# Patient Record
Sex: MALE | Race: WHITE | HISPANIC OR LATINO | Employment: FULL TIME | URBAN - METROPOLITAN AREA
[De-identification: names, ages, dates, MRNs, and addresses within clinical notes are randomized per-mention and may not be internally consistent; named-entity substitution may affect disease eponyms.]

---

## 2017-09-13 ENCOUNTER — OFFICE VISIT (OUTPATIENT)
Dept: URGENT CARE | Facility: MEDICAL CENTER | Age: 22
End: 2017-09-13

## 2018-05-12 ENCOUNTER — TRANSCRIBE ORDERS (OUTPATIENT)
Dept: ADMINISTRATIVE | Facility: HOSPITAL | Age: 23
End: 2018-05-12

## 2018-05-12 ENCOUNTER — APPOINTMENT (OUTPATIENT)
Dept: RADIOLOGY | Age: 23
End: 2018-05-12
Payer: COMMERCIAL

## 2018-05-12 DIAGNOSIS — M54.50 ACUTE LEFT-SIDED LOW BACK PAIN WITHOUT SCIATICA: ICD-10-CM

## 2018-05-12 DIAGNOSIS — M25.552 ACUTE HIP PAIN, LEFT: ICD-10-CM

## 2018-05-12 DIAGNOSIS — M54.50 ACUTE LEFT-SIDED LOW BACK PAIN WITHOUT SCIATICA: Primary | ICD-10-CM

## 2018-05-12 PROCEDURE — 72110 X-RAY EXAM L-2 SPINE 4/>VWS: CPT

## 2018-05-12 PROCEDURE — 73502 X-RAY EXAM HIP UNI 2-3 VIEWS: CPT

## 2018-06-07 ENCOUNTER — TELEPHONE (OUTPATIENT)
Dept: UROLOGY | Facility: AMBULATORY SURGERY CENTER | Age: 23
End: 2018-06-07

## 2018-06-07 NOTE — TELEPHONE ENCOUNTER
Complaint/Diagnosis - Groin pain  Insurance-[employer]  History of Cancer? no             If yes, what kind? Previous urologist?no         Records requested/where? Outside testing/where?

## 2018-06-12 ENCOUNTER — OFFICE VISIT (OUTPATIENT)
Dept: UROLOGY | Facility: CLINIC | Age: 23
End: 2018-06-12
Payer: COMMERCIAL

## 2018-06-12 VITALS
DIASTOLIC BLOOD PRESSURE: 80 MMHG | SYSTOLIC BLOOD PRESSURE: 130 MMHG | HEART RATE: 76 BPM | BODY MASS INDEX: 41.75 KG/M2 | HEIGHT: 73 IN | WEIGHT: 315 LBS | RESPIRATION RATE: 16 BRPM

## 2018-06-12 DIAGNOSIS — N50.819 TESTICLE PAIN: Primary | ICD-10-CM

## 2018-06-12 LAB
POST-VOID RESIDUAL VOLUME, ML POC: 116 ML
SL AMB  POCT GLUCOSE, UA: NORMAL
SL AMB LEUKOCYTE ESTERASE,UA: NORMAL
SL AMB POCT BILIRUBIN,UA: NORMAL
SL AMB POCT BLOOD,UA: NORMAL
SL AMB POCT CLARITY,UA: CLEAR
SL AMB POCT COLOR,UA: YELLOW
SL AMB POCT KETONES,UA: NORMAL
SL AMB POCT NITRITE,UA: NORMAL
SL AMB POCT PH,UA: 6
SL AMB POCT SPECIFIC GRAVITY,UA: 1.01
SL AMB POCT URINE PROTEIN: NORMAL
SL AMB POCT UROBILINOGEN: NORMAL

## 2018-06-12 PROCEDURE — 99244 OFF/OP CNSLTJ NEW/EST MOD 40: CPT | Performed by: UROLOGY

## 2018-06-12 PROCEDURE — 81002 URINALYSIS NONAUTO W/O SCOPE: CPT | Performed by: UROLOGY

## 2018-06-12 PROCEDURE — 51798 US URINE CAPACITY MEASURE: CPT | Performed by: UROLOGY

## 2018-06-12 RX ORDER — IBUPROFEN 600 MG/1
600 TABLET ORAL EVERY 6 HOURS SCHEDULED
Qty: 30 TABLET | Refills: 0 | Status: SHIPPED | OUTPATIENT
Start: 2018-06-12 | End: 2019-01-24 | Stop reason: HOSPADM

## 2018-06-12 RX ORDER — CYCLOBENZAPRINE HCL 5 MG
10 TABLET ORAL 3 TIMES DAILY
COMMUNITY
Start: 2018-05-11 | End: 2019-01-24 | Stop reason: HOSPADM

## 2018-06-12 RX ORDER — NAPROXEN 500 MG/1
500 TABLET ORAL 2 TIMES DAILY
COMMUNITY
Start: 2018-05-11 | End: 2018-06-12

## 2018-06-12 NOTE — PROGRESS NOTES
UROLOGY NEW CONSULT NOTE     CHIEF COMPLAINT   Vince Vázquez is a 25 y o  male with a complaint of   Chief Complaint   Patient presents with    Testicle Pain    Lump on testicle       History of Present Illness:     25 y o  male who began having right-sided testicular pain in 2015  He had an ultrasound at that time that demonstrated some epididymal head cysts and a scrotal henna  He has had ongoing musculoskeletal back issues for which he sees a chiropractor but these are primarily left-sided  The patient has no lower extremity neuropathy  He has noted onset of the palpable nodule along the right spermatic cord  This is point tender  The patient has persistent dull ache of the scrotum with occasional sharp pains  It is sometimes worsen with sexual intercourse or ejaculation  There is nothing that makes it better or worse  He has used naproxen in the past without relief  Past Medical History:   History reviewed  No pertinent past medical history  PAST SURGICAL HISTORY:   No past surgical history on file  CURRENT MEDICATIONS:     Current Outpatient Prescriptions   Medication Sig Dispense Refill    cyclobenzaprine (FLEXERIL) 5 mg tablet Take 5-10 mg by mouth Three times a day      naproxen (NAPROSYN) 500 mg tablet Take 500 mg by mouth 2 (two) times a day       No current facility-administered medications for this visit  ALLERGIES:     Allergies   Allergen Reactions    Shellfish Allergy        SOCIAL HISTORY:     Social History     Social History    Marital status: Single     Spouse name: N/A    Number of children: N/A    Years of education: N/A     Social History Main Topics    Smoking status: None    Smokeless tobacco: None    Alcohol use None    Drug use: Unknown    Sexual activity: Not Asked     Other Topics Concern    None     Social History Narrative    None       SOCIAL HISTORY:   No family history on file      REVIEW OF SYSTEMS:     Review of Systems Constitutional: Negative  Respiratory: Negative  Cardiovascular: Negative  Gastrointestinal: Negative  Genitourinary: Positive for testicular pain  Musculoskeletal: Positive for back pain  Skin: Negative  Neurological: Negative  Psychiatric/Behavioral: Negative  PHYSICAL EXAM:     /80   Pulse 76   Resp 16   Ht 6' 1" (1 854 m)   Wt (!) 171 kg (377 lb)   BMI 49 74 kg/m²     General:  Obese male in no acute distress  HEENT:  Normocephalic, atraumatic  Neck is supple without any palpable lymphadenopathy  Cardiovascular:  Patient has normal palpable distal radial pulses  There is no significant peripheral edema  No JVD is noted  Respiratory:  Patient has unlabored respirations  There is no audible wheeze or rhonchi  Abdomen:  Abdomen is obese without surgical scars  Abdomen is soft and nontender  There is no tympany  Inguinal and umbilical hernia are not appreciated  Genitourinary:  Circumcised phallus, somewhat buried  Testicles descended, left testicle and spermatic cord are normal without nodules  Right testicle is smooth without nodules  Along the right spermatic cord more proximal than the epididymal head there is a 2 cm figure-of-eight shaped area of induration  It is difficult to assess whether this is part of the vas deferens or vasculature  It does not appear to flush or distend with Valsalva maneuver  It is tender to the patient  Musculoskeletal:  Patient does have some left-sided hip and back pain  Dermatologic:  Patient has no skin abnormalities or rashes  LABS:     CBC: No results found for: WBC, HGB, HCT, MCV, PLT    BMP: No results found for: GLUCOSE, CALCIUM, NA, K, CO2, CL, BUN, CREATININE    IMAGIN  SCROTAL ULTRASOUND   INDICATION- Right testicular pain and swelling for 2 years which has   worsened over the past 2 weeks  COMPARISON- None     TECHNIQUE-   Ultrasound the scrotal contents was performed with a high   frequency linear transducer utilizing volumetric sweep imaging as well   as standard still image techniques  Imaging performed in longitudinal   and transverse orientation  Color and spectral Doppler evaluation also   performed bilaterally  FINDINGS-   TESTES-    Testes are symmetric and normal in size  RIGHT testis = 5 2 x 2 6 cm    Normal contour with homogeneous smooth echotexture  No intratesticular mass lesion or calcifications  LEFT testis = 4 8 x 2 7 cm   Normal contour with homogeneous smooth echotexture  No intratesticular mass lesion or calcifications  Doppler flow within both testes is present and symmetric  EPIDIDYMIDES-    Normal Size  Doppler ultrasound demonstrates normal blood flow  Bilateral epididymal head cysts  HYDROCELE-  No significant fluid present  VARICOCELE-  None present  SCROTUM-  Scrotal thickness and appearance within normal limits  Small scrotal henna on the right  No evidence for extratesticular mass   or hernia demonstrated  IMPRESSION-   Normal testicles  Bilateral small simple epididymal head cysts  Right-sided scrotal henna measuring 4 mm     Transcribed on- [id number]           HELEN Gonzalez MD        Reading Radiologist- HELEN Bailey MD        Electronically Signed- HELEN Bailey MD        Released Date Time- 07/08/15 2057     PROCEDURE:     Recent Results (from the past 2 hour(s))   POCT urine dip    Collection Time: 06/12/18  6:33 PM   Result Value Ref Range    LEUKOCYTE ESTERASE,UA neg      NITRITE,UA neg     SL AMB POCT UROBILINOGEN neg     SL AMB POCT URINE PROTEIN neg      PH,UA 6      BLOOD,UA neg      SPECIFIC GRAVITY,UA 1 015      KETONES,UA neg      BILIRUBIN,UA neg     GLUCOSE, UA neg      COLOR,UA yellow      CLARITY,UA clear    POCT Measure PVR    Collection Time: 06/12/18  6:37 PM   Result Value Ref Range    POST-VOID RESIDUAL VOLUME, ML  mL       ASSESSMENT:     25 y o  male with PALPABLE ABNORMALITY PROXIMAL ON THE SPERMATIC CORD WITH RIGHT-SIDED SCROTAL PAIN FOR THE LAST 3 YEARS    PLAN:     I have recommended a scrotal ultrasound to trend from his prior  I have recommended 5 days of around the clock ibuprofen 600 mg and tight-fitting scrotal support  The patient should utilize warm soaks in the tub her heating pad twice a day while he is doing this peer patient does have musculoskeletal back issues although his pain does not appear musculoskeletal or neuropathic in origin  He has point tenderness over the abnormality on his scrotum  I recommended against surgical intervention as this would potentially worsen his pain or impact later fertility  I recommended supportive care  If the patient fails, we would consider gabapentin, a referral to pain management specialist, or even a local nerve block    I will see the patient back after his ultrasound is complete within the next month

## 2018-06-13 ENCOUNTER — HOSPITAL ENCOUNTER (OUTPATIENT)
Dept: ULTRASOUND IMAGING | Facility: MEDICAL CENTER | Age: 23
Discharge: HOME | End: 2018-06-13
Payer: COMMERCIAL

## 2018-06-13 DIAGNOSIS — N50.819 TESTICLE PAIN: ICD-10-CM

## 2018-06-13 PROCEDURE — 76870 US EXAM SCROTUM: CPT

## 2018-06-18 ENCOUNTER — OFFICE VISIT (OUTPATIENT)
Dept: UROLOGY | Facility: CLINIC | Age: 23
End: 2018-06-18
Payer: COMMERCIAL

## 2018-06-18 VITALS
DIASTOLIC BLOOD PRESSURE: 68 MMHG | SYSTOLIC BLOOD PRESSURE: 134 MMHG | BODY MASS INDEX: 41.75 KG/M2 | WEIGHT: 315 LBS | HEIGHT: 73 IN

## 2018-06-18 DIAGNOSIS — N50.819 TESTICLE PAIN: Primary | ICD-10-CM

## 2018-06-18 PROCEDURE — 99213 OFFICE O/P EST LOW 20 MIN: CPT | Performed by: PHYSICIAN ASSISTANT

## 2018-06-18 NOTE — PROGRESS NOTES
6/18/2018      Chief Complaint   Patient presents with    Testicle Pain     f/u US on 6/13/18       Assessment and Plan    25 y o  male managed by Dr Gaby Sears    1  Right sided testicular pain  -discussed the treatment options of pain management referral, gabapentin, and a local nerve block as the patient pain is not relieved by supportive measure  The patient is going away on July 1st to [facility] and wishes to have treatment prior to this as he will be physically active an is worried about subsequent pain  Will discuss the case with Dr Gaby Sears to determine the best option  Will call patient following this discussion  History of Present Illness  Sweta Kuhn is a 25 y o  male here for follow up evaluation of right testicular pain  Initially his right-sided testicular pain occurred in 2015  He had an ultrasound at that time that demonstrated some epididymal head cysts and a scrotal henna  Most recently had pain with a palpable nodule along the right spermatic cord  U/S repeated and reveals small bilateral epididymal head cysts  He was  given supportive measures which the patient has been doing with minimal relief  He states that physical activity seems to worsen this so he has cut back on physical activity  However, he is going away July 1st to why and please he will be physically active on this occasion  Review of Systems   Constitutional: Negative for activity change, chills and fever  Gastrointestinal: Negative for abdominal distention and abdominal pain  Musculoskeletal: Negative for back pain and gait problem  Psychiatric/Behavioral: Negative for behavioral problems and confusion  Urinary Incontinence Screening      Most Recent Value   Urinary Incontinence   Urinary Incontinence? No   Incomplete emptying? No   Urinary frequency? No   Urinary urgency? No   Urinary hesitancy? No   Dysuria (painful difficult urination)? No   Nocturia (waking up to use the bathroom)?   No Straining (having to push to go)? No   Weak stream?  No   Intermittent stream?  No   Post void dribbling? No          Past Medical History  Past Medical History:   Diagnosis Date    Testicle pain        Past Social History  History reviewed  No pertinent surgical history  History   Smoking Status    Never Smoker   Smokeless Tobacco    Never Used       Past Family History  Family History   Problem Relation Age of Onset    Stroke Father     Diabetes Mother        Past Social history  Social History     Social History    Marital status: Single     Spouse name: N/A    Number of children: N/A    Years of education: N/A     Occupational History    Not on file  Social History Main Topics    Smoking status: Never Smoker    Smokeless tobacco: Never Used    Alcohol use Yes      Comment: socially    Drug use: No    Sexual activity: Not on file     Other Topics Concern    Not on file     Social History Narrative    No narrative on file       Current Medications  Current Outpatient Prescriptions   Medication Sig Dispense Refill    cyclobenzaprine (FLEXERIL) 5 mg tablet Take 5-10 mg by mouth Three times a day      ibuprofen (MOTRIN) 600 mg tablet Take 1 tablet (600 mg total) by mouth every 6 (six) hours for 5 days 30 tablet 0     No current facility-administered medications for this visit  Allergies  Allergies   Allergen Reactions    Shellfish Allergy        The following portions of the patient's history were reviewed and updated as appropriate: allergies, current medications, past medical history, past social history, past surgical history and problem list       Vitals  Vitals:    06/18/18 1500   BP: 134/68   BP Location: Right arm   Patient Position: Sitting   Cuff Size: Large   Weight: (!) 168 kg (371 lb)   Height: 6' 1" (1 854 m)         Physical Exam  Constitutional   General appearance: Patient is seated and in no acute distress, well appearing and well nourished     Head and Face   Head and face: Normal     Eyes   Conjunctiva and lids: No erythema, swelling or discharge  Ears, Nose, Mouth, and Throat   Hearing: Normal     Pulmonary   Respiratory effort: No increased work of breathing or signs of respiratory distress  Cardiovascular   Examination of extremities for edema and/or varicosities: Normal     Abdomen   Abdomen: Non-tender, no masses  Musculoskeletal   Gait and station: Normal     Skin   Skin and subcutaneous tissue: Warm, dry, and intact  No visible lesions or rashes  Psychiatric   Judgment and insight: Normal  Recent and remote memory:  Normal  Mood and affect: Normal      Results  No results found for this or any previous visit (from the past 1 hour(s))  ]  No results found for: PSA  No results found for: GLUCOSE, CALCIUM, NA, K, CO2, CL, BUN, CREATININE  No results found for: WBC, HGB, HCT, MCV, PLT      SCROTAL ULTRASOUND  INDICATION:    N50 819: Testicular pain, unspecified  COMPARISON: 7/8/2015  TECHNIQUE:   Ultrasound the scrotal contents was performed with a high frequency linear transducer utilizing volumetric sweep imaging as well as standard still image techniques  Imaging performed in longitudinal and transverse orientation  Color and   spectral Doppler evaluation also performed bilaterally      FINDINGS:     TESTES:   Testes are symmetric and normal in size      RIGHT testis = 4 7 x 2 8 x 3 3 cm   Normal contour with homogeneous smooth echotexture  No intratesticular mass lesion or calcifications      LEFT testis = 4 9 x 2 9 x 3 5 cm  Normal contour with homogeneous smooth echotexture  No intratesticular mass lesion or calcifications      Doppler flow within both testes is present and symmetric      EPIDIDYMIDES:   Normal Size  Doppler ultrasound demonstrates normal blood flow    There are small epididymal cyst(s) bilaterally, similar to the prior exam  Otherwise unremarkable      HYDROCELE:  No significant fluid present      VARICOCELE:  None present      SCROTUM:  Scrotal thickness and appearance within normal limits  No evidence for extratesticular mass or hernia demonstrated      IMPRESSION:  1  Small bilateral epididymal head cysts  Otherwise, unremarkable scrotal exam         Orders  No orders of the defined types were placed in this encounter

## 2018-06-21 ENCOUNTER — TELEPHONE (OUTPATIENT)
Dept: UROLOGY | Facility: CLINIC | Age: 23
End: 2018-06-21

## 2018-06-21 DIAGNOSIS — N50.82 SCROTAL PAIN: Primary | ICD-10-CM

## 2018-06-21 RX ORDER — GABAPENTIN 300 MG/1
300 CAPSULE ORAL
Qty: 30 CAPSULE | Refills: 2 | Status: ON HOLD | OUTPATIENT
Start: 2018-06-21 | End: 2019-01-24

## 2018-06-21 NOTE — PROGRESS NOTES
Discussed 300 milligrams of gabapentin nightly with the patient and reviewed side effects including possible drowsy next  Because of this he understands he should trial this medication when he has no important events the following day  He will trial this in follow-up in 3 months to see how he is doing  If failure will consider cord block  The patient understands agrees this treatment plan  All questions and concerns have been addressed answered

## 2019-01-23 ENCOUNTER — APPOINTMENT (OUTPATIENT)
Dept: RADIOLOGY | Facility: HOSPITAL | Age: 24
End: 2019-01-23
Payer: OTHER MISCELLANEOUS

## 2019-01-23 ENCOUNTER — HOSPITAL ENCOUNTER (OUTPATIENT)
Facility: HOSPITAL | Age: 24
Setting detail: OBSERVATION
Discharge: HOME | End: 2019-01-24
Attending: EMERGENCY MEDICINE | Admitting: INTERNAL MEDICINE
Payer: OTHER MISCELLANEOUS

## 2019-01-23 ENCOUNTER — APPOINTMENT (EMERGENCY)
Dept: RADIOLOGY | Facility: HOSPITAL | Age: 24
End: 2019-01-23
Payer: OTHER MISCELLANEOUS

## 2019-01-23 DIAGNOSIS — M54.16 RADICULOPATHY OF LUMBAR REGION: Primary | ICD-10-CM

## 2019-01-23 DIAGNOSIS — N50.82 SCROTAL PAIN: ICD-10-CM

## 2019-01-23 PROCEDURE — 96374 THER/PROPH/DIAG INJ IV PUSH: CPT

## 2019-01-23 PROCEDURE — 99219 PR INITIAL OBSERVATION CARE/DAY 50 MINUTES: CPT | Performed by: INTERNAL MEDICINE

## 2019-01-23 PROCEDURE — 72131 CT LUMBAR SPINE W/O DYE: CPT

## 2019-01-23 PROCEDURE — 72148 MRI LUMBAR SPINE W/O DYE: CPT

## 2019-01-23 PROCEDURE — 96375 TX/PRO/DX INJ NEW DRUG ADDON: CPT

## 2019-01-23 PROCEDURE — 99285 EMERGENCY DEPT VISIT HI MDM: CPT

## 2019-01-23 RX ORDER — ONDANSETRON 2 MG/ML
INJECTION INTRAMUSCULAR; INTRAVENOUS
Status: COMPLETED
Start: 2019-01-23 | End: 2019-01-23

## 2019-01-23 RX ORDER — MUSCLE RUB CREAM 100; 150 MG/G; MG/G
CREAM TOPICAL 4 TIMES DAILY PRN
Status: DISCONTINUED | OUTPATIENT
Start: 2019-01-23 | End: 2019-01-24 | Stop reason: HOSPADM

## 2019-01-23 RX ORDER — METHOCARBAMOL 500 MG/1
500 TABLET, FILM COATED ORAL ONCE
Status: COMPLETED | OUTPATIENT
Start: 2019-01-23 | End: 2019-01-23

## 2019-01-23 RX ORDER — LIDOCAINE 50 MG/G
2 PATCH TOPICAL ONCE
Status: DISCONTINUED | OUTPATIENT
Start: 2019-01-23 | End: 2019-01-24

## 2019-01-23 RX ORDER — ACETAMINOPHEN 325 MG/1
975 TABLET ORAL EVERY 8 HOURS SCHEDULED
Status: DISCONTINUED | OUTPATIENT
Start: 2019-01-23 | End: 2019-01-24 | Stop reason: HOSPADM

## 2019-01-23 RX ORDER — ONDANSETRON 2 MG/ML
4 INJECTION INTRAMUSCULAR; INTRAVENOUS EVERY 8 HOURS PRN
Status: DISCONTINUED | OUTPATIENT
Start: 2019-01-23 | End: 2019-01-24 | Stop reason: HOSPADM

## 2019-01-23 RX ORDER — KETOROLAC TROMETHAMINE 30 MG/ML
15 INJECTION, SOLUTION INTRAMUSCULAR; INTRAVENOUS ONCE
Status: COMPLETED | OUTPATIENT
Start: 2019-01-23 | End: 2019-01-23

## 2019-01-23 RX ORDER — MELOXICAM 15 MG/1
15 TABLET ORAL DAILY
COMMUNITY
End: 2021-02-15

## 2019-01-23 RX ORDER — HYDROMORPHONE HCL/PF 1 MG/ML
0.5 SYRINGE (ML) INJECTION ONCE
Status: COMPLETED | OUTPATIENT
Start: 2019-01-23 | End: 2019-01-23

## 2019-01-23 RX ORDER — MORPHINE SULFATE 10 MG/ML
6 INJECTION, SOLUTION INTRAMUSCULAR; INTRAVENOUS ONCE
Status: COMPLETED | OUTPATIENT
Start: 2019-01-23 | End: 2019-01-23

## 2019-01-23 RX ORDER — MORPHINE SULFATE 4 MG/ML
INJECTION, SOLUTION INTRAMUSCULAR; INTRAVENOUS
Status: COMPLETED
Start: 2019-01-23 | End: 2019-01-23

## 2019-01-23 RX ORDER — POLYETHYLENE GLYCOL 3350 17 G/17G
17 POWDER, FOR SOLUTION ORAL DAILY
Status: DISCONTINUED | OUTPATIENT
Start: 2019-01-24 | End: 2019-01-24 | Stop reason: HOSPADM

## 2019-01-23 RX ORDER — OXYCODONE HYDROCHLORIDE 5 MG/1
5 TABLET ORAL EVERY 4 HOURS PRN
Status: DISCONTINUED | OUTPATIENT
Start: 2019-01-23 | End: 2019-01-24

## 2019-01-23 RX ORDER — LIDOCAINE 50 MG/G
1 PATCH TOPICAL ONCE
Status: COMPLETED | OUTPATIENT
Start: 2019-01-23 | End: 2019-01-24

## 2019-01-23 RX ORDER — METHOCARBAMOL 750 MG/1
750 TABLET, FILM COATED ORAL EVERY 6 HOURS SCHEDULED
Status: DISCONTINUED | OUTPATIENT
Start: 2019-01-23 | End: 2019-01-24

## 2019-01-23 RX ORDER — DEXAMETHASONE SODIUM PHOSPHATE 4 MG/ML
4 INJECTION, SOLUTION INTRA-ARTICULAR; INTRALESIONAL; INTRAMUSCULAR; INTRAVENOUS; SOFT TISSUE EVERY 8 HOURS SCHEDULED
Status: COMPLETED | OUTPATIENT
Start: 2019-01-23 | End: 2019-01-24

## 2019-01-23 RX ORDER — KETOROLAC TROMETHAMINE 30 MG/ML
15 INJECTION, SOLUTION INTRAMUSCULAR; INTRAVENOUS EVERY 6 HOURS PRN
Status: DISCONTINUED | OUTPATIENT
Start: 2019-01-23 | End: 2019-01-24

## 2019-01-23 RX ORDER — KETOROLAC TROMETHAMINE 30 MG/ML
15 INJECTION, SOLUTION INTRAMUSCULAR; INTRAVENOUS EVERY 6 HOURS PRN
Status: DISCONTINUED | OUTPATIENT
Start: 2019-01-23 | End: 2019-01-23

## 2019-01-23 RX ADMIN — KETOROLAC TROMETHAMINE 15 MG: 30 INJECTION, SOLUTION INTRAMUSCULAR at 19:57

## 2019-01-23 RX ADMIN — HYDROMORPHONE HYDROCHLORIDE 0.5 MG: 1 INJECTION, SOLUTION INTRAMUSCULAR; INTRAVENOUS; SUBCUTANEOUS at 17:19

## 2019-01-23 RX ADMIN — ONDANSETRON 4 MG: 2 INJECTION INTRAMUSCULAR; INTRAVENOUS at 18:25

## 2019-01-23 RX ADMIN — ACETAMINOPHEN 975 MG: 325 TABLET, FILM COATED ORAL at 21:05

## 2019-01-23 RX ADMIN — DEXAMETHASONE SODIUM PHOSPHATE 4 MG: 4 INJECTION, SOLUTION INTRAMUSCULAR; INTRAVENOUS at 22:06

## 2019-01-23 RX ADMIN — METHOCARBAMOL 750 MG: 750 TABLET, FILM COATED ORAL at 21:05

## 2019-01-23 RX ADMIN — OXYCODONE HYDROCHLORIDE 5 MG: 5 TABLET ORAL at 20:32

## 2019-01-23 RX ADMIN — LIDOCAINE 1 PATCH: 50 PATCH CUTANEOUS at 15:01

## 2019-01-23 RX ADMIN — METHOCARBAMOL 500 MG: 500 TABLET, FILM COATED ORAL at 15:01

## 2019-01-23 RX ADMIN — MORPHINE SULFATE 6 MG: 10 INJECTION INTRAVENOUS at 15:00

## 2019-01-23 RX ADMIN — KETOROLAC TROMETHAMINE 15 MG: 30 INJECTION, SOLUTION INTRAMUSCULAR at 15:01

## 2019-01-23 NOTE — ASSESSMENT & PLAN NOTE
· Occurred while patient was on "light duty" at work moving a speaker  · CT L-spine revealed right subarticular disc protrusion at level L4-5 causing mild right foraminal narrowing without significant canal stenosis  No spinal canal or foraminal stenosis at other levels  · Follow-up MRI    Pending the result, consider neurosurgery consult  · Pain control - discussed with attending, will trial short steroid dose, start with Dexamethasone 4mg Q8hr today, then Q12hr tomorrow, then taper to PO  · PT consult

## 2019-01-23 NOTE — LETTER
[facility] [address] [facility] [address]  Dept: [phone]    January 24, 2019     Patient: Julia Flannery   YOB: 1995   Date of Visit: 1/23/2019       To Whom it May Concern:    Julia Flannery is under my professional care  He was seen in the hospital from 1/23/2019   to 01/24/19  He should remain out of work until at least 2/4/18  After that, he will need re-evaluation by physician       If you have any questions or concerns, please don't hesitate to call           Sincerely,          Halima Randle PA-C

## 2019-01-23 NOTE — ED ATTENDING ATTESTATION
Ludmila Stanley MD, saw and evaluated the patient  I have discussed the patient with the resident/non-physician practitioner and agree with the resident's/non-physician practitioner's findings, Plan of Care, and MDM as documented in the resident's/non-physician practitioner's note, except where noted  All available labs and Radiology studies were reviewed  At this point I agree with the current assessment done in the Emergency Department  I have conducted an independent evaluation of this patient a history and physical is as follows:      Critical Care Time  CritCare Time    Procedures     27 yo male c/o back pain while lifting something at work and twisted and felt right sided lower back pain radiating to knee  Pt with pain and difficulty walking due to pain  Pt with chronic mid back pain, no acute pain there at this time  No incontinence, no saddle numbness  Vss, afebrile, lungs cta, rrr, abodmen soft nontender, tender lumbar area, right paraspinal, limited rom due to pain, nvi  Ct lspine, pain meds

## 2019-01-23 NOTE — H&P
H&P- Keegan Moreno 1995, 26 y o  male MRN: [id number]    Unit/Bed#: ED 25 Encounter: [id number]    Primary Care Provider: Nisha Lee DO   Date and time admitted to hospital: 1/23/2019 12:05 PM        * Acute right lumbar radiculopathy   Assessment & Plan    · Occurred while patient was on "light duty" at work moving a speaker  · CT L-spine revealed right subarticular disc protrusion at level L4-5 causing mild right foraminal narrowing without significant canal stenosis  No spinal canal or foraminal stenosis at other levels  · Follow-up MRI  Pending the result, consider neurosurgery consult  · Pain control - discussed with attending, will trial short steroid dose, start with Dexamethasone 4mg Q8hr today, then Q12hr tomorrow, then taper to PO  · PT consult       VTE Prophylaxis: patient is low-risk per admission screen  Code Status: FULL CODE   POLST: POLST form is not discussed and not completed at this time  Discussion with family: mother at bedside     Anticipated Length of Stay:  Patient will be admitted on an Observation basis with an anticipated length of stay of  < 2 midnights  Justification for Hospital Stay: acute lumbar and hip pain following injury, for MRI, pending results, pain control, PT eval     Total Time for Visit, including Counseling / Coordination of Care: 1 hour  Greater than 50% of this total time spent on direct patient counseling and coordination of care  Chief Complaint:   Lower back and right hip pain    History of Present Illness:    Keegan Moreno is a 26 y o  male with a history of mid back thoracic pain from work-related injury that happened in November 2018 and follows with [address] with recent MRI of the thoracic spine which was normal and is currently undergoing physical therapy who presents with acute lumbar back pain with radiation to the right hip  He states that he has been doing PT for his mid-back and is improving   He reports that he infrequently uses the Flexeril he is prescribed as he has has less back spasms  He denies any prior history of problems with his lower back  He reports that he has been on light duty, lifting no more than 30 lb at work  He states that today he was moving a speaker when the top portion began to shift to the left, causing him to lean to the left, picking his right leg off the ground to stabilize, and when he put his right leg down had a sharp pain that felt like he got shot" in the right hip into his lower back  He reports that he could not ambulate and it took him approximately 15 seconds just to sit in a chair which was placed right behind him  He states that they had to call the ambulance because he could not walk  He denies numbness or tingling currently in the right leg  He states that his range of motion is limited secondary to pain  He denies bowel or bladder incontinence  He denies saddle anesthesia  He denies fevers or chills  He denies chest pain or shortness of breath  He denies abdominal pain, nausea, vomiting, diarrhea  He reports feeling dizzy from the morphine and Dilaudid  Review of Systems:    Review of Systems   Constitutional: Negative for appetite change, chills and fever  HENT: Positive for sore throat  Respiratory: Negative for shortness of breath  Gastrointestinal: Negative for abdominal pain, constipation, diarrhea, nausea and vomiting  Genitourinary: Negative for hematuria  Musculoskeletal: Positive for arthralgias, back pain and gait problem  Skin: Negative for rash  Neurological: Positive for dizziness (after morphine and dilaudid)  Psychiatric/Behavioral: The patient is not nervous/anxious  Past Medical and Surgical History:     Past Medical History:   Diagnosis Date    Testicle pain        History reviewed  No pertinent surgical history  Meds/Allergies:    Prior to Admission medications    Medication Sig Start Date End Date Taking?  Authorizing Provider   cyclobenzaprine (FLEXERIL) 5 mg tablet Take 5-10 mg by mouth Three times a day 5/11/18 1/23/19 Yes Historical Provider, MD   ibuprofen (MOTRIN) 600 mg tablet Take 1 tablet (600 mg total) by mouth every 6 (six) hours for 5 days 6/12/18 1/23/19 Yes Kelli Delvalle MD   gabapentin (NEURONTIN) 300 mg capsule Take 1 capsule (300 mg total) by mouth daily at bedtime 6/21/18   Ye Martin PA-C     I have reviewed home medications with patient personally  Allergies: Allergies   Allergen Reactions    Shellfish Allergy        Social History:     Marital Status: Single   Occupation: was on light duty at work   Patient Pre-hospital Living Situation: Home  Patient Pre-hospital Level of Mobility: Ambulatory at baseline  Patient Pre-hospital Diet Restrictions: None reported   Substance Use History:   History   Alcohol Use    Yes     Comment: socially     History   Smoking Status    Never Smoker   Smokeless Tobacco    Never Used     History   Drug Use No       Family History:    Family History   Problem Relation Age of Onset    Stroke Father     Diabetes Mother        Physical Exam:     Vitals:   Blood Pressure: 119/67 (01/23/19 1715)  Pulse: 94 (01/23/19 1715)  Temperature: 99 1 °F (37 3 °C) (01/23/19 1207)  Temp Source: Oral (01/23/19 1207)  Respirations: 18 (01/23/19 1715)  SpO2: 97 % (01/23/19 1715)    Physical Exam   Constitutional: He is oriented to person, place, and time  No distress  Patient seen sitting upright comfortably resting in ED bed with his mother present at bedside  He is very pleasant and cooperative  Cardiovascular: Normal rate and regular rhythm  Pulmonary/Chest: Effort normal and breath sounds normal  No respiratory distress  He has no wheezes  Abdominal: Soft  Bowel sounds are normal  There is no tenderness  Morbidly obese   Musculoskeletal: He exhibits no edema  Neurological: He is alert and oriented to person, place, and time  Skin: Skin is warm   He is not diaphoretic  Patient reports equal and intact sensation to bilateral extremities  Right hip flexion strength exam limited secondary to pain   Psychiatric: He has a normal mood and affect  His behavior is normal    Vitals reviewed  Additional Data:     Lab Results: I have personally reviewed pertinent reports  Imaging: I have personally reviewed pertinent reports  CT spine lumbar wo contrast   Final Result by Isidra Osorio MD (01/23 5106)      1  No acute osseous injury  2   Right subarticular disc protrusion at level L4-5 causing mild right foraminal narrowing without significant canal stenosis  No spinal canal or foraminal stenosis in other levels  Workstation performed: [id number]         MRI lumbar spine wo contrast    (Results Pending)       EKG, Pathology, and Other Studies Reviewed on Admission:   · MRI T-spine     Allscripts / Epic Records Reviewed: Yes     ** Please Note: This note has been constructed using a voice recognition system   **

## 2019-01-23 NOTE — ED PROVIDER NOTES
History  Chief Complaint   Patient presents with    Back Injury     injury to lower right side of back when lifting object chest height  patient reports trying to catch falling object and twisted back, could not bear weight on right leg       41-year-old male presents for back pain  Patient was at work lifting a heavy box when he lost his balance causing him to twist to the left his right leg came off the ground  Blood putting pressure back on his right leg after regaining his balance he had pain in the lumbar spine right paraspinal region and down the right leg to the knee  No numbness tingling weakness  Difficulty ambulating secondary to pain in the right leg  No saddle anesthesia urinary incontinence or retention  Has history of thoracic back pain but a unremarkable MRI T-spine within the past 2 weeks  He denies any fevers chills rigors or history of IV drug use  Had no back pain in the lumbar spine prior to today's incident  He called the ambulance was given 2 separate doses of morphine prior to arrival   On exam patient well appearing no acute distress  Normal vital signs  Strength is limited the right leg secondary to pain  Straight leg raise on the left creates lumbar back pain on the right  Normal sensation distally  Normal achilles and patellar reflex b/l  Prior to Admission Medications   Prescriptions Last Dose Informant Patient Reported? Taking?    cyclobenzaprine (FLEXERIL) 5 mg tablet Past Week at Unknown time Self Yes Yes   Sig: Take 10 mg by mouth Three times a day     gabapentin (NEURONTIN) 300 mg capsule   No No   Sig: Take 1 capsule (300 mg total) by mouth daily at bedtime   ibuprofen (MOTRIN) 600 mg tablet Past Week at Unknown time  No Yes   Sig: Take 1 tablet (600 mg total) by mouth every 6 (six) hours for 5 days   meloxicam (MOBIC) 15 mg tablet   Yes Yes   Sig: Take 15 mg by mouth daily      Facility-Administered Medications: None       Past Medical History:   Diagnosis Date    Back pain     Testicle pain        History reviewed  No pertinent surgical history  Family History   Problem Relation Age of Onset    Stroke Father     Diabetes Mother      I have reviewed and agree with the history as documented  Social History   Substance Use Topics    Smoking status: Never Smoker    Smokeless tobacco: Never Used    Alcohol use Yes      Comment: socially        Review of Systems   Constitutional: Negative for chills, fatigue and fever  HENT: Negative for congestion and sore throat  Eyes: Negative for visual disturbance  Respiratory: Negative for cough, chest tightness, shortness of breath and wheezing  Cardiovascular: Negative for chest pain, palpitations and leg swelling  Gastrointestinal: Negative for abdominal pain, blood in stool, diarrhea, nausea and vomiting  Genitourinary: Negative for difficulty urinating, dysuria, flank pain and hematuria  Musculoskeletal: Positive for back pain and gait problem  Skin: Negative for rash  Neurological: Negative for dizziness, syncope, weakness, light-headedness, numbness and headaches  Hematological: Negative for adenopathy  Psychiatric/Behavioral: Negative for sleep disturbance  Physical Exam  ED Triage Vitals [01/23/19 1207]   Temperature Pulse Respirations Blood Pressure SpO2   99 1 °F (37 3 °C) 97 18 163/61 97 %      Temp Source Heart Rate Source Patient Position - Orthostatic VS BP Location FiO2 (%)   Oral Monitor Sitting Right arm --      Pain Score       9           Orthostatic Vital Signs  Vitals:    01/23/19 1715 01/23/19 2009 01/24/19 0012 01/24/19 0722   BP: 119/67 122/67 131/61 126/68   Pulse: 94 (!) 110 91 81   Patient Position - Orthostatic VS: Sitting Sitting  Lying       Physical Exam   Constitutional: He is oriented to person, place, and time  He appears well-developed and well-nourished  HENT:   Head: Normocephalic and atraumatic  Eyes: Pupils are equal, round, and reactive to light  Conjunctivae and EOM are normal    Neck: Normal range of motion  Neck supple  No JVD present  Cardiovascular: Normal rate and regular rhythm  No murmur heard  Pulmonary/Chest: Effort normal and breath sounds normal  He has no wheezes  He has no rales  Abdominal: Soft  Bowel sounds are normal  He exhibits no distension  There is no tenderness  Musculoskeletal: He exhibits no edema  Lymphadenopathy:     He has no cervical adenopathy  Neurological: He is alert and oriented to person, place, and time  He displays no atrophy  No cranial nerve deficit or sensory deficit  He exhibits normal muscle tone  Gait abnormal      4/5 strength on the right Dorsi/plantar flexion secondary to pain  5/5 lower extremity strength on the left  Spasm palpable in the right paraspinal lumbar region  No midline tenderness  Positive straight leg raise on the left  Normal sensation to the median, lateral  Malleolus  And 1st web space  Difficulty ambulating putting full weight on right leg secondary to pain  Skin: Skin is warm and intact  No rash noted  He is not diaphoretic  Psychiatric: He has a normal mood and affect  Vitals reviewed        ED Medications  Medications   ondansetron (ZOFRAN) injection 4 mg (4 mg Intravenous Given 1/23/19 1825)   polyethylene glycol (MIRALAX) packet 17 g (17 g Oral Not Given 1/24/19 0917)   dexamethasone (DECADRON) injection 4 mg (4 mg Intravenous Given 1/24/19 0554)   oxyCODONE (ROXICODONE) IR tablet 5 mg (5 mg Oral Given 1/24/19 1248)   menthol-methyl salicylate (BENGAY) 08-76 % cream (not administered)   acetaminophen (TYLENOL) tablet 975 mg (975 mg Oral Given 1/24/19 0554)   lidocaine (LIDODERM) 5 % patch 2 patch (0 patches Topical Hold 1/23/19 2105)   methocarbamol (ROBAXIN) tablet 750 mg (750 mg Oral Given 1/24/19 1156)   ketorolac (TORADOL) injection 15 mg (not administered)    EMS REPLENISHMENT MED ( Does not apply Given to EMS 1/23/19 1221)   morphine 2 mg/mL injection **ADS Override Pull** (  Given to EMS 1/23/19 1221)   morphine (PF) 4 mg/mL injection **ADS Override Pull** (  Given to EMS 1/23/19 1222)   morphine (PF) 10 mg/mL injection 6 mg (6 mg Intravenous Given 1/23/19 1500)   ketorolac (TORADOL) injection 15 mg (15 mg Intravenous Given 1/23/19 1501)   lidocaine (LIDODERM) 5 % patch 1 patch (1 patch Topical Patch Removed 1/24/19 0300)   methocarbamol (ROBAXIN) tablet 500 mg (500 mg Oral Given 1/23/19 1501)   HYDROmorphone (DILAUDID) injection 0 5 mg (0 5 mg Intravenous Given 1/23/19 1719)   influenza vaccine, quadrivalent (FLULAVAL) IM injection 0 5 mL (0 5 mL Intramuscular Given 1/24/19 0923)       Diagnostic Studies  Results Reviewed     Procedure Component Value Units Date/Time    CBC (With Platelets) [[id number]]  (Abnormal) Collected:  01/24/19 0529    Lab Status:  Final result Specimen:  Blood from Hand, Right Updated:  01/24/19 0929     WBC 10 59 (H) Thousand/uL      RBC 5 31 Million/uL      Hemoglobin 15 5 g/dL      Hematocrit 48 8 %      MCV 92 fL      MCH 29 2 pg      MCHC 31 8 g/dL      RDW 13 3 %      Platelets 880 Thousands/uL      MPV 10 4 fL     Basic metabolic panel [[id number]]  (Abnormal) Collected:  01/24/19 0529    Lab Status:  Final result Specimen:  Blood from Hand, Right Updated:  01/24/19 0656     Sodium 132 (L) mmol/L      Potassium 5 0 mmol/L      Chloride 103 mmol/L      CO2 24 mmol/L      ANION GAP 5 mmol/L      BUN 15 mg/dL      Creatinine 0 85 mg/dL      Glucose 113 mg/dL      Calcium 9 6 mg/dL      eGFR 124 ml/min/1 73sq m     Narrative:         National Kidney Disease Education Program recommendations are as follows:  GFR calculation is accurate only with a steady state creatinine  Chronic Kidney disease less than 60 ml/min/1 73 sq  meters  Kidney failure less than 15 ml/min/1 73 sq  meters                   MRI lumbar spine wo contrast   Final Result by Nato Laws MD (01/23 2002)      Mild right L4-L5 foraminal/lateral disc protrusion (containing annular fissure tear) possibly touching the exiting right L4 nerve root  Facet arthrosis at L4-L5 and L5-S1 bilaterally         Workstation performed: [id number]         CT spine lumbar wo contrast   Final Result by Almas Taveras MD (01/23 1636)      1  No acute osseous injury  2   Right subarticular disc protrusion at level L4-5 causing mild right foraminal narrowing without significant canal stenosis  No spinal canal or foraminal stenosis in other levels  Workstation performed: [id number]               Procedures  Procedures      Phone Consults  ED Phone Contact    ED Course  ED Course as of Jan 24 1319   Wed Jan 23, 2019   1654   Able to schedule patient for MRI at 630-700p Which is the next available opening  Secondary to significant difficulty ambulating and pain despite analgesia, will admit for obs and MRI lumbar spine  MDM  CritCare Time    Disposition  Final diagnoses:   Radiculopathy of lumbar region     Time reflects when diagnosis was documented in both MDM as applicable and the Disposition within this note     Time User Action Codes Description Comment    1/23/2019  5:05 PM Naomy Clevelandacher Add [M54 16] Radiculopathy of lumbar region       ED Disposition     ED Disposition Condition Comment    Admit  Case was discussed with VIBHA and the patient's admission status was agreed to be Admission Status: observation status to the service of Dr Bard Izquierdo           Follow-up Information    None         Current Discharge Medication List      CONTINUE these medications which have NOT CHANGED    Details   cyclobenzaprine (FLEXERIL) 5 mg tablet Take 10 mg by mouth Three times a day        ibuprofen (MOTRIN) 600 mg tablet Take 1 tablet (600 mg total) by mouth every 6 (six) hours for 5 days  Qty: 30 tablet, Refills: 0    Associated Diagnoses: Testicle pain      meloxicam (MOBIC) 15 mg tablet Take 15 mg by mouth daily      gabapentin (NEURONTIN) 300 mg capsule Take 1 capsule (300 mg total) by mouth daily at bedtime  Qty: 30 capsule, Refills: 2    Associated Diagnoses: Scrotal pain           No discharge procedures on file  ED Provider  Attending physically available and evaluated Walter Alford I managed the patient along with the ED Attending      Electronically Signed by         Shasha Almanza DO  01/24/19 1385

## 2019-01-24 VITALS
TEMPERATURE: 98 F | HEIGHT: 73 IN | SYSTOLIC BLOOD PRESSURE: 137 MMHG | WEIGHT: 315 LBS | BODY MASS INDEX: 41.75 KG/M2 | RESPIRATION RATE: 20 BRPM | DIASTOLIC BLOOD PRESSURE: 67 MMHG | HEART RATE: 87 BPM | OXYGEN SATURATION: 96 %

## 2019-01-24 LAB
ANION GAP SERPL CALCULATED.3IONS-SCNC: 5 MMOL/L (ref 4–13)
BUN SERPL-MCNC: 15 MG/DL (ref 5–25)
CALCIUM SERPL-MCNC: 9.6 MG/DL (ref 8.3–10.1)
CHLORIDE SERPL-SCNC: 103 MMOL/L (ref 100–108)
CO2 SERPL-SCNC: 24 MMOL/L (ref 21–32)
CREAT SERPL-MCNC: 0.85 MG/DL (ref 0.6–1.3)
ERYTHROCYTE [DISTWIDTH] IN BLOOD BY AUTOMATED COUNT: 13.3 % (ref 11.6–15.1)
EST. AVERAGE GLUCOSE BLD GHB EST-MCNC: 126 MG/DL
GFR SERPL CREATININE-BSD FRML MDRD: 124 ML/MIN/1.73SQ M
GLUCOSE SERPL-MCNC: 113 MG/DL (ref 65–140)
HBA1C MFR BLD: 6 % (ref 4.2–6.3)
HCT VFR BLD AUTO: 48.8 % (ref 36.5–49.3)
HGB BLD-MCNC: 15.5 G/DL (ref 12–17)
MCH RBC QN AUTO: 29.2 PG (ref 26.8–34.3)
MCHC RBC AUTO-ENTMCNC: 31.8 G/DL (ref 31.4–37.4)
MCV RBC AUTO: 92 FL (ref 82–98)
PLATELET # BLD AUTO: 267 THOUSANDS/UL (ref 149–390)
PMV BLD AUTO: 10.4 FL (ref 8.9–12.7)
POTASSIUM SERPL-SCNC: 5 MMOL/L (ref 3.5–5.3)
RBC # BLD AUTO: 5.31 MILLION/UL (ref 3.88–5.62)
SODIUM SERPL-SCNC: 132 MMOL/L (ref 136–145)
WBC # BLD AUTO: 10.59 THOUSAND/UL (ref 4.31–10.16)

## 2019-01-24 PROCEDURE — 85027 COMPLETE CBC AUTOMATED: CPT | Performed by: PHYSICIAN ASSISTANT

## 2019-01-24 PROCEDURE — G8979 MOBILITY GOAL STATUS: HCPCS

## 2019-01-24 PROCEDURE — 99217 PR OBSERVATION CARE DISCHARGE MANAGEMENT: CPT | Performed by: PHYSICIAN ASSISTANT

## 2019-01-24 PROCEDURE — 97162 PT EVAL MOD COMPLEX 30 MIN: CPT

## 2019-01-24 PROCEDURE — G8978 MOBILITY CURRENT STATUS: HCPCS

## 2019-01-24 PROCEDURE — 80048 BASIC METABOLIC PNL TOTAL CA: CPT | Performed by: PHYSICIAN ASSISTANT

## 2019-01-24 PROCEDURE — 83036 HEMOGLOBIN GLYCOSYLATED A1C: CPT | Performed by: INTERNAL MEDICINE

## 2019-01-24 PROCEDURE — 90686 IIV4 VACC NO PRSV 0.5 ML IM: CPT | Performed by: INTERNAL MEDICINE

## 2019-01-24 RX ORDER — OXYCODONE HYDROCHLORIDE 5 MG/1
5 TABLET ORAL EVERY 4 HOURS PRN
Status: DISCONTINUED | OUTPATIENT
Start: 2019-01-24 | End: 2019-01-24 | Stop reason: HOSPADM

## 2019-01-24 RX ORDER — ACETAMINOPHEN 325 MG/1
975 TABLET ORAL 3 TIMES DAILY
Qty: 30 TABLET | Refills: 0
Start: 2019-01-24 | End: 2022-01-18

## 2019-01-24 RX ORDER — LIDOCAINE 50 MG/G
1 PATCH TOPICAL DAILY
Status: DISCONTINUED | OUTPATIENT
Start: 2019-01-24 | End: 2019-01-24 | Stop reason: HOSPADM

## 2019-01-24 RX ORDER — GABAPENTIN 300 MG/1
300 CAPSULE ORAL
Qty: 30 CAPSULE | Refills: 0 | Status: SHIPPED | OUTPATIENT
Start: 2019-01-24 | End: 2021-02-15 | Stop reason: ALTCHOICE

## 2019-01-24 RX ORDER — LIDOCAINE 50 MG/G
1 PATCH TOPICAL DAILY
Qty: 30 PATCH | Refills: 0
Start: 2019-01-25 | End: 2021-02-15

## 2019-01-24 RX ORDER — DEXAMETHASONE SODIUM PHOSPHATE 4 MG/ML
4 INJECTION, SOLUTION INTRA-ARTICULAR; INTRALESIONAL; INTRAMUSCULAR; INTRAVENOUS; SOFT TISSUE EVERY 12 HOURS SCHEDULED
Status: DISCONTINUED | OUTPATIENT
Start: 2019-01-24 | End: 2019-01-24 | Stop reason: HOSPADM

## 2019-01-24 RX ORDER — METHYLPREDNISOLONE 4 MG/1
TABLET ORAL
Qty: 21 TABLET | Refills: 0 | Status: SHIPPED | OUTPATIENT
Start: 2019-01-24 | End: 2021-02-15 | Stop reason: ALTCHOICE

## 2019-01-24 RX ORDER — METHOCARBAMOL 500 MG/1
1000 TABLET, FILM COATED ORAL EVERY 6 HOURS SCHEDULED
Qty: 112 TABLET | Refills: 0 | Status: SHIPPED | OUTPATIENT
Start: 2019-01-25 | End: 2021-02-15 | Stop reason: ALTCHOICE

## 2019-01-24 RX ORDER — OXYCODONE HYDROCHLORIDE 10 MG/1
10 TABLET ORAL EVERY 4 HOURS PRN
Status: DISCONTINUED | OUTPATIENT
Start: 2019-01-24 | End: 2019-01-24 | Stop reason: HOSPADM

## 2019-01-24 RX ORDER — OXYCODONE HYDROCHLORIDE 5 MG/1
5 TABLET ORAL EVERY 4 HOURS PRN
Qty: 30 TABLET | Refills: 0 | Status: SHIPPED | OUTPATIENT
Start: 2019-01-24 | End: 2019-01-29

## 2019-01-24 RX ORDER — METHOCARBAMOL 500 MG/1
1000 TABLET, FILM COATED ORAL EVERY 6 HOURS SCHEDULED
Status: DISCONTINUED | OUTPATIENT
Start: 2019-01-24 | End: 2019-01-24 | Stop reason: HOSPADM

## 2019-01-24 RX ORDER — GABAPENTIN 300 MG/1
300 CAPSULE ORAL DAILY
Status: DISCONTINUED | OUTPATIENT
Start: 2019-01-24 | End: 2019-01-24 | Stop reason: HOSPADM

## 2019-01-24 RX ADMIN — LIDOCAINE 1 PATCH: 50 PATCH CUTANEOUS at 17:23

## 2019-01-24 RX ADMIN — DEXAMETHASONE SODIUM PHOSPHATE 4 MG: 4 INJECTION, SOLUTION INTRAMUSCULAR; INTRAVENOUS at 15:11

## 2019-01-24 RX ADMIN — INFLUENZA VIRUS VACCINE 0.5 ML: 15; 15; 15; 15 SUSPENSION INTRAMUSCULAR at 09:23

## 2019-01-24 RX ADMIN — METHOCARBAMOL 750 MG: 750 TABLET, FILM COATED ORAL at 11:56

## 2019-01-24 RX ADMIN — METHOCARBAMOL 750 MG: 750 TABLET, FILM COATED ORAL at 05:54

## 2019-01-24 RX ADMIN — OXYCODONE HYDROCHLORIDE 5 MG: 5 TABLET ORAL at 12:48

## 2019-01-24 RX ADMIN — OXYCODONE HYDROCHLORIDE 10 MG: 10 TABLET ORAL at 16:34

## 2019-01-24 RX ADMIN — METHOCARBAMOL 1000 MG: 500 TABLET, FILM COATED ORAL at 17:19

## 2019-01-24 RX ADMIN — DEXAMETHASONE SODIUM PHOSPHATE 4 MG: 4 INJECTION, SOLUTION INTRAMUSCULAR; INTRAVENOUS at 05:54

## 2019-01-24 RX ADMIN — GABAPENTIN 300 MG: 300 CAPSULE ORAL at 14:45

## 2019-01-24 RX ADMIN — ACETAMINOPHEN 975 MG: 325 TABLET, FILM COATED ORAL at 05:54

## 2019-01-24 RX ADMIN — OXYCODONE HYDROCHLORIDE 5 MG: 5 TABLET ORAL at 04:32

## 2019-01-24 RX ADMIN — ACETAMINOPHEN 975 MG: 325 TABLET, FILM COATED ORAL at 14:45

## 2019-01-24 NOTE — PHYSICAL THERAPY NOTE
Physical Therapy Screen    Patient Name: [place] ROBIN Date: 1/24/2019     Problem List  Patient Active Problem List   Diagnosis    Testicle pain    Acute right lumbar radiculopathy        Past Medical History  Past Medical History:   Diagnosis Date    Back pain     Testicle pain         Past Surgical History  History reviewed  No pertinent surgical history  01/24/19 1315   Note Type   Note type Eval only   Pain Assessment   Pain Assessment 0-10   Pain Score 9   Pain Type Acute pain   Pain Location Back   Pain Orientation Bilateral;Lower  (R side more than L )   Pain Descriptors Aching; Sharp   Pain Frequency Constant/continuous   Pain Onset Ongoing   Clinical Progression Gradually improving   Hospital Pain Intervention(s) Cold applied;Repositioned; Ambulation/increased activity   Home Living   Type of [address] One level;Ramped entrance; Able to live on main level with bedroom/bathroom   Bathroom Equipment Grab bars around toilet   Home Equipment [facility])   Prior Function   Level of [place] Independent with ADLs and functional mobility   Lives With (umberto' and her family )   Aki Kulwant Garza 33 in the last 6 months 0   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   Restrictions/Precautions   Weight Bearing Precautions Per Order No   Other Precautions Fall Risk;Pain   General   Family/Caregiver Present No   Cognition   Overall Cognitive Status WFL   Arousal/Participation Alert   Attention Within functional limits   Orientation Level Oriented X4   Memory Within functional limits   Following Commands Follows all commands and directions without difficulty   RLE Assessment   RLE Assessment X  (limited AROM due to reported increase in R hip/back pain )   Strength RLE   RLE Overall Strength 4-/5  (with pain)   LLE Assessment   LLE Assessment WFL   Strength LLE   LLE Overall Strength 5/5   Coordination   Sensation WFL   Light Touch   RLE Light Touch Grossly intact   LLE Light Touch Grossly intact   Proprioception   RLE Proprioception Grossly intact   LLE Proprioception Grossly Intact   Bed Mobility   Rolling L 5  Supervision   Additional items Increased time required;Verbal cues  (30 deg raised- unable to tolerate flat bed)   Supine to Sit 3  Moderate assistance   Additional items Increased time required;Verbal cues   Transfers   Sit to Stand 4  Minimal assistance  (min initially to CG/CS with subsequent transfers)   Additional items Increased time required;Verbal cues  (dec WBing on R LE initially )   Stand to Sit 4  Minimal assistance  (CG-CS)   Stand pivot 4  Minimal assistance  (CG-CS using a RW)   Toilet transfer 4  Minimal assistance  (to sit, CS to stand up using L grab bar with RW)   Ambulation/Elevation   Gait pattern Improper Weight shift;Decreased foot clearance; Inconsistent candace; Antalgic;Decreased R stance   Gait Assistance (CG-CS)   Additional items Verbal cues  (bend R knee to facilitate swing but reported inc in pain )   Assistive Device Rolling walker   Distance 30  (bed to doorway then toilet)   Ramp Technique Not tested   Balance   Static Sitting Normal   Dynamic Sitting Fair +   Static Standing Fair   Dynamic Standing Fair -   Ambulatory Fair -   Activity Tolerance   Activity Tolerance Patient limited by pain   Medical Staff Made Aware CM [id number]    Nurse Made Aware RJ Calvillo   Assessment   Prognosis Good   Problem List Decreased strength;Decreased endurance; Impaired balance;Decreased mobility;Obesity;Pain   Assessment Pt is a 26year old male admitted to the hospital due to acute R lumbar radiculopathy  Pt is seen for moderate complexity eval with co-morbidities affecting pt's physical performance at time of assessment include pain, morbid obesity   Personal factors affecting pt at time of IE include dec endurance, dec mobility and dec R LE mm strength  Pt lives at home in [address] [facility] house with ramp to enter and was fully independent without an AD at baseline  On eval pt requires mod A for supine to sit with HOB raised 30 deg as report unable to tolerate flat bed at this time  Pt educated on using pillows or sleeping on a recliner at home, pt said will try chair with ottoman as don't have a recliner  Pt initially required min A for sit to stand transfer with noted dec WBing through R LE but improved to CG-CS with subsequent transfers using a RW with cues on proper hand placement as well as observing abdominal bracing to lessen pain  Pt was able to tolerate walking from recliner to doorway then to the bathroom using grab bar and walker required min A to sit but CS to stand up, also was able to manage pulling up/down brief with CGA only  No knee buckling demonstrated throughout PT session and was left sitting on the recliner with cold pack on the low back at end of tx session  Discussed d/c POC with pt and he was comfortable going home at this level of assistance using a walker and ongoing pain is much tolerable compared to yesterday although he continues to c/o 9/10 back pain during mobilities  Per pt fiance is a PCA and  is off work until next Tuesday and other household members can also provide assistance to pt if fiance is not there  Pt agreeable to continue outpatient PT services upon d/c  Pt will benefit from skilled PT while in acute care to work on functional mobility indep with walker  Barriers to Discharge None   Barriers to Discharge Comments per pt his fiance' and family will be able to provide assistance as needed at home  fiance will also be off work until next tuesday   Goals   Patient Goals to have less pain   STG Expiration Date 01/31/19   Short Term Goal #1 pt to complete bed/chair/toilet transfers at mod indep level using LRAD  pt to ambulate x 150' at mod indep level using LRAD      Short Term Goal #2 pt to ascend/descend ramp using LRAD at mod indep level  Plan   Treatment/Interventions Functional transfer training; Endurance training;Spoke to nursing;Bed mobility;Gait training;Equipment eval/education   PT Frequency 2-3x/wk   Recommendation   Recommendation Outpatient PT;24 hour supervision/assist;Home with family support   Equipment Recommended Walker  (bariatric/ wide walker )   PT - OK to Discharge Yes   Additional Comments when medically appropriate   pt ok to go home using a RW and confirmed ability of umberto and her family to provide assistance at home   Barthel Index   Feeding 10   Bathing 0   Grooming Score 5   Dressing Score 10   Bladder Score 10   Bowels Score 10   Toilet Use Score 10   Transfers (Bed/Chair) Score 10   Mobility (Level Surface) Score 0   Stairs Score 0   Barthel Index Score 65

## 2019-01-24 NOTE — NURSING NOTE
Discharge instructions given to patient and girlfriend  Reviewed instructions and prescriptions with patient, girlfriend and two family members  Teach back method used, no further questions asked  patient being transported by wheelchair by myself and accompanied by family

## 2019-01-24 NOTE — PLAN OF CARE
Problem: PHYSICAL THERAPY ADULT  Goal: Performs mobility at highest level of function for planned discharge setting  See evaluation for individualized goals  Treatment/Interventions: Functional transfer training, Endurance training, Spoke to nursing, [employer] mobility, Gait training, Equipment eval/education  Equipment Recommended: Walker (bariatric/ wide walker )       See flowsheet documentation for full assessment, interventions and recommendations  Prognosis: Good  Problem List: Decreased strength, Decreased endurance, Impaired balance, Decreased mobility, Obesity, Pain  Assessment: Pt is a 26year old male admitted to the hospital due to acute R lumbar radiculopathy  Pt is seen for moderate complexity eval with co-morbidities affecting pt's physical performance at time of assessment include pain, morbid obesity  Personal factors affecting pt at time of IE include dec endurance, dec mobility and dec R LE mm strength  Pt lives at home in [address] [place] [address] with ramp to enter and was fully independent without an AD at baseline  On eval pt requires mod A for supine to sit with HOB raised 30 deg as report unable to tolerate flat bed at this time  Pt educated on using pillows or sleeping on a recliner at home, pt said will try chair with ottoman as don't have a recliner  Pt initially required min A for sit to stand transfer with noted dec WBing through R LE but improved to CG-CS with subsequent transfers using a RW with cues on proper hand placement as well as observing abdominal bracing to lessen pain  Pt was able to tolerate walking from recliner to doorway then to the bathroom using grab bar and walker required min A to sit but CS to stand up, also was able to manage pulling up/down brief with CGA only  No knee buckling demonstrated throughout PT session and was left sitting on the recliner with cold pack on the low back at end of tx session   Discussed d/c POC with pt and he was comfortable going home at this level of assistance using a walker and ongoing pain is much tolerable compared to yesterday although he continues to c/o 9/10 back pain during mobilities  Per pt umberto is a PCA and  is off work until next Tuesday and other household members can also provide assistance to pt if umberto is not there  Pt agreeable to continue outpatient PT services upon d/c  Pt will benefit from skilled PT while in acute care to work on functional mobility indep with walker  Barriers to Discharge: None  Barriers to Discharge Comments: per pt his fijose elias' and family will be able to provide assistance as needed at home  umberto will also be off work until next tuesday  Recommendation: Outpatient PT, 24 hour supervision/assist, Home with family support     PT - OK to Discharge: Yes    See flowsheet documentation for full assessment

## 2019-01-24 NOTE — DISCHARGE INSTRUCTIONS
Follow-up with your family doctor    Take steroid pack as directed  You can obtain lidocaine patches over the counter  Take Tylenol scheduled for the next few days- you can take 975 mg three times daily (Regular strength Tylenol is 325 mg, so you can take three of those pills three times a day)  Take Robaxin as prescribed  Take gabapentin as prescribed  Take oxycodone only as needed

## 2019-01-24 NOTE — UTILIZATION REVIEW
Network Utilization Review Department  Phone: [phone]; Fax [phone]  [email or web address]  ATTENTION: Please call with any questions or concerns to [phone]  and carefully listen to the prompts so that you are directed to the right person  Send all requests for admission clinical reviews, approved or denied determinations and any other requests to fax [phone]  All voicemails are confidential   ======================================================================  Continued Stay Review-[place]  Date: 01/24/2019  Vital Signs: /68 (BP Location: Right arm)   Pulse 81   Temp 98 9 °F (37 2 °C) (Oral)   Resp 20   Ht 6' 1" (1 854 m)   Wt (!) 171 kg (376 lb 15 8 oz)   SpO2 95%   BMI 49 74 kg/m²   Assessment/Plan: 1217  * Acute right lumbar radiculopathy   Assessment & Plan     · L4-5 foraminal lateral disc protrusion  · No surgical intervention planned  · Continue with pain control- Scheduled Tylenol, steroids, Robaxin, short course of PRN opioids  · Added gabapentin 300 mg today  Uptitrate as tolerated  · Awaiting PT eval for discharge planning   Patient has not been able to get out of bed          VTE Pharmacologic Prophylaxis:   Pharmacologic: None- low risk  Mechanical VTE Prophylaxis in Place: No  Continue observation status pending PT evaluation for safe discharge planning  Medications:   Scheduled Meds:   Current Facility-Administered Medications:  acetaminophen 975 mg Oral Q8H [address] Alexander Nino MD   dexamethasone 4 mg Intravenous [facility] Kerney Opitz, PA-C   ketorolac 15 mg Intravenous Q6H PRN Kerney Opitz, PA-C   lidocaine 2 patch Topical Once Herson Rios MD   menthol-methyl salicylate  Apply externally 4x Daily PRN Herson Rios MD   methocarbamol 750 mg Oral Q6H [address] Herson Rios MD   ondansetron 4 mg Intravenous Q8H PRN Kerney Opitz, PA-C   oxyCODONE 5 mg Oral Q4H PRN Kerney Opitz, PA-C   polyethylene glycol 17 g Oral Daily Kalie Hansen PA-C   Pertinent Labs/Diagnostic Results:   Age/Sex: 26 y o  male   Discharge Plan: TBD

## 2019-01-24 NOTE — ASSESSMENT & PLAN NOTE
· L4-5 foraminal lateral disc protrusion  · No surgical intervention planned  · Continue with pain control- Scheduled Tylenol, steroids, Robaxin, short course of PRN opioids   PDMP website checked- no suspicious activity  · Gabapentin  · PT evaluated patient and felt he was safe for discharge home with walker and this was arranged as well as outpatient PT

## 2019-01-24 NOTE — SOCIAL WORK
CM called [phone] and spoke with [facility] with [employer]  [facility] stated that once they received the referral they can eliver the walker to pt's room jessica Colindres, pt and pt's RN informed

## 2019-01-24 NOTE — ASSESSMENT & PLAN NOTE
· L4-5 foraminal lateral disc protrusion  · No surgical intervention planned  · Continue with pain control- Scheduled Tylenol, steroids, Robaxin, short course of PRN opioids  · Added gabapentin 300 mg today  Uptitrate as tolerated  · Awaiting PT eval for discharge planning  Patient has not been able to get out of bed

## 2019-01-24 NOTE — DISCHARGE SUMMARY
Discharge- Iris Marrero 1995, 26 y o  male MRN: [id number]    Unit/Bed#: -01 Encounter: [id number]    Primary Care Provider: Emily Marin DO   Date and time admitted to hospital: 1/23/2019 12:05 PM        * Acute right lumbar radiculopathy   Assessment & Plan    · L4-5 foraminal lateral disc protrusion  · No surgical intervention planned  · Continue with pain control- Scheduled Tylenol, steroids, Robaxin, short course of PRN opioids  PDMP website checked- no suspicious activity  · Gabapentin  · PT evaluated patient and felt he was safe for discharge home with walker and this was arranged as well as outpatient PT       Discharging Physician / Practitioner: Adelaide Vo PA-C  PCP: Emily Marin DO  Admission Date:   Admission Orders     Ordered        01/23/19 1706  Place in Observation (expected length of stay for this patient is less than two midnights)  Once             Discharge Date: 01/24/19    Resolved Problems  Date Reviewed: 1/24/2019    None          Consultations During Hospital Stay:  · Neurosurgery    Procedures Performed:   · None    Significant Findings / Test Results:   · CT lumbar spine:  No acute osseous abnormality  Right subarticular disc protrusion at level L4-5 causing mild right foraminal narrowing without significant canal stenosis  · MRI lumbar spine: Mild right L4-L5 foraminal/lateral disc protrusion (containing annular fissure tear) possibly touching the exiting right L4 nerve root  Facet arthrosis at L4-L5 and L5-S1 bilaterally  Incidental Findings:   · None     Test Results Pending at Discharge (will require follow up): · None     Outpatient Tests Requested:  · None    Complications:  None    Reason for Admission: Acute back pain    Hospital Course:     Iris Marrero is a 26 y o  male patient with morbid obesity who originally presented to the hospital on 1/23/2019 due to acute back pain   Patient was moving a speaker at work when he felt a sharp pain in his right hip into his lower back  He subsequently had trouble ambulating due to pain  Patient noted to have back pain with radiculopathy  He was admitted under observation status for pain control and neurosurgery consultation  Imagine showed L4-L5 disc protrusion  Neurosurgery did not recommend any surgical intervention at this time  He was evaluated by PT who felt he was safe to discharge home  Walker arranged for patient as well as script for outpatient PT  He was given a work note and will need to follow-up with PCP for any further time off of work  Please see above list of diagnoses and related plan for additional information  Condition at Discharge: good     Discharge Day Visit / Exam:     * Please refer to separate progress note for these details *    Discussion with Family: Family at bedside3    Discharge instructions/Information to patient and family:   See after visit summary for information provided to patient and family  Provisions for Follow-Up Care:  See after visit summary for information related to follow-up care and any pertinent home health orders  Disposition:     Home    For Discharges to [facility]:   · Not Applicable to this Patient - Not Applicable to this Patient    Planned Readmission: None     Discharge Statement:  I spent 45 minutes discharging the patient  This time was spent on the day of discharge  I had direct contact with the patient on the day of discharge  Greater than 50% of the total time was spent examining patient, answering all patient questions, arranging and discussing plan of care with patient as well as directly providing post-discharge instructions  Additional time then spent on discharge activities  Discharge Medications:  See after visit summary for reconciled discharge medications provided to patient and family        ** Please Note: This note has been constructed using a voice recognition system **

## 2019-01-24 NOTE — PROGRESS NOTES
Progress Note - Walter Alford 1995, 26 y o  male MRN: [id number]    Unit/Bed#: -01 Encounter: [id number]    Primary Care Provider: Jaspreet Kahn DO   Date and time admitted to hospital: 2019 12:05 PM    * Acute right lumbar radiculopathy   Assessment & Plan    · L4-5 foraminal lateral disc protrusion  · No surgical intervention planned  · Continue with pain control- Scheduled Tylenol, steroids, Robaxin, short course of PRN opioids  · Added gabapentin 300 mg today  Uptitrate as tolerated  · Awaiting PT eval for discharge planning  Patient has not been able to get out of bed  VTE Pharmacologic Prophylaxis:   Pharmacologic: None- low risk  Mechanical VTE Prophylaxis in Place: No    Patient Centered Rounds: Discussed with RN    Discussions with Specialists or Other Care Team Provider: Discussed with neurosurgery    Education and Discussions with Family / Patient: Patient    Time Spent for Care: 30 minutes  More than 50% of total time spent on counseling and coordination of care as described above  Current Length of Stay: 0 day(s)    Current Patient Status: Observation   Certification Statement: Continue observation status pending PT evaluation for safe discharge planning    Discharge Plan: Pending PT eval    Code Status: Level 1 - Full Code      Subjective:   Pain medicine has helped patient's pain while he is at rest but when he moves he has a lot of pain  He has only been able to sit up at the edge of the bed in order to urinate into urinal but he has not been able to walk  Objective:     Vitals:   Temp (24hrs), Av 9 °F (37 2 °C), Min:98 °F (36 7 °C), Max:100 °F (37 8 °C)    Temp:  [98 °F (36 7 °C)-100 °F (37 8 °C)] 98 9 °F (37 2 °C)  HR:  [] 81  Resp:  [18-20] 20  BP: (119-131)/(61-68) 126/68  SpO2:  [95 %-97 %] 95 %  Body mass index is 49 74 kg/m²  Input and Output Summary (last 24 hours):        Intake/Output Summary (Last 24 hours) at 19 1352  Last data filed at 01/24/19 1211   Gross per 24 hour   Intake              630 ml   Output              775 ml   Net             -145 ml       Physical Exam:     Physical Exam   Constitutional: He is oriented to person, place, and time  No distress  HENT:   Head: Normocephalic and atraumatic  Eyes: No scleral icterus  Neck: Neck supple  Cardiovascular: Normal rate, regular rhythm and normal heart sounds  Pulmonary/Chest: Effort normal and breath sounds normal  No respiratory distress  He has no wheezes  He has no rales  Abdominal:   Obese   Musculoskeletal: He exhibits no edema  Neurological: He is alert and oriented to person, place, and time  Able to lift both legs off the bed  Has pain when lifting RLE off bed   Skin: Skin is warm and dry  He is not diaphoretic  Psychiatric: He has a normal mood and affect  Additional Data:     Labs:      Results from last 7 days  Lab Units 01/24/19  0529   WBC Thousand/uL 10 59*   HEMOGLOBIN g/dL 15 5   HEMATOCRIT % 48 8   PLATELETS Thousands/uL 267       Results from last 7 days  Lab Units 01/24/19  0529   POTASSIUM mmol/L 5 0   CHLORIDE mmol/L 103   CO2 mmol/L 24   BUN mg/dL 15   CREATININE mg/dL 0 85   CALCIUM mg/dL 9 6           * I Have Reviewed All Lab Data Listed Above  * Additional Pertinent Lab Tests Reviewed:  All Labs Within Last 24 Hours Reviewed    Imaging:    Imaging Reports Reviewed Today Include: None  Imaging Personally Reviewed by Myself Includes:  None    Recent Cultures (last 7 days):           Last 24 Hours Medication List:     Current Facility-Administered Medications:  acetaminophen 975 mg Oral Q8H [address] Ysabel Leslie MD   dexamethasone 4 mg Intravenous [facility] Shobha Stanton PA-C   dexamethasone 4 mg Intravenous Q12H [address] ARIANE Sarah   gabapentin 300 mg Oral Daily Vernon Burks PA-C   lidocaine 2 patch Topical Once Ysabel Leslie MD   menthol-methyl salicylate  Apply externally 4x Daily PRN Ysabel Leslie MD   methocarbamol 750 mg Oral Q6H [facility] Chrystie Dose, MD   ondansetron 4 mg Intravenous Q8H PRN Katherine Moe PA-C   oxyCODONE 10 mg Oral Q4H PRN Kimmy Ayala PA-C   oxyCODONE 5 mg Oral Q4H PRN Kimmy Ayala PA-C   polyethylene glycol 17 g Oral Daily Katherine Moe PA-C        Today, Patient Was Seen By: Kimmy Ayala PA-C    ** Please Note: Dragon 360 Dictation voice to text software may have been used in the creation of this document   **

## 2019-01-24 NOTE — SOCIAL WORK
CM met pt and umbertoeamon Diana at bedside, introduced self and made aware of CM role at dc  Pt denies hving a LW or POA  Primary contact is Pedrito Parrish [phone]  Pt lives with rohan in a 1 story house with a ramp to enter  Pt was IPTA with all ADl's, drive and employed  Pt has no hx of HHC, STR, alc, drug and IP psych tx  Pt reported that he is current with OP PT with  facility  Pharmacy is [facility]  Pt has no PCP, infolink card given  Pt stated that he wants to find a PCP himself  Pt stated that the injury happened at work d/t lifting  Pt has transportation when dc  CM reviewed d/c planning process including the following: identifying help at home, patient preference for d/c planning needs, Discharge Lounge, [facility] Meds to Bed program, availability of treatment team to discuss questions or concerns patient and/or family may have regarding understanding medications and recognizing signs and symptoms once discharged  CM also encouraged patient to follow up with all recommended appointments after discharge  Patient advised of importance for patient and family to participate in managing patients medical well being

## 2019-01-24 NOTE — UTILIZATION REVIEW
Network Utilization Review Department  Phone: [phone]; Fax [phone]  [email or web address]  ATTENTION: Please call with any questions or concerns to [phone]  and carefully listen to the prompts so that you are directed to the right person  Send all requests for admission clinical reviews, approved or denied determinations and any other requests to fax [phone]  All voicemails are confidential   ================================================================  Initial Clinical Review-[place]  Admission: Date/Time/Statement: 01/23/2019 @ 1706  Orders Placed This Encounter   Procedures    Place in Observation (expected length of stay for this patient is less than two midnights)     Standing Status:   Standing     Number of Occurrences:   1     Order Specific Question:   Admitting Physician     Answer:   Jose Evangelista     Order Specific Question:   Level of Care     Answer:   Med Surg [16]     ED: Date/Time/Mode of Arrival:   ED Arrival Information     Expected Arrival Acuity Means of Arrival Escorted By Service Admission Type    - 1/23/2019 12:04 Urgent Ambulance [facility] Urgent    Arrival Complaint    back pain        Chief Complaint:   Chief Complaint   Patient presents with    Back Injury     injury to lower right side of back when lifting object chest height  patient reports trying to catch falling object and twisted back, could not bear weight on right leg  History of Illness:   Barbara Bowden is a 26 y o  male with a history of mid back thoracic pain from work-related injury that happened in November 2018 and follows with [address] with recent MRI of the thoracic spine which was normal and is currently undergoing physical therapy who presents with acute lumbar back pain with radiation to the right hip  He states that he has been doing PT for his mid-back and is improving   He reports that he infrequently uses the Flexeril he is prescribed as he has has less back spasms  He denies any prior history of problems with his lower back  He reports that he has been on light duty, lifting no more than 30 lb at work  He states that today he was moving a speaker when the top portion began to shift to the left, causing him to lean to the left, picking his right leg off the ground to stabilize, and when he put his right leg down had a sharp pain that felt like he got shot" in the right hip into his lower back  He reports that he could not ambulate and it took him approximately 15 seconds just to sit in a chair which was placed right behind him  He states that they had to call the ambulance because he could not walk  He denies numbness or tingling currently in the right leg  He states that his range of motion is limited secondary to pain  ED Vital Signs:   ED Triage Vitals [01/23/19 1207]   Temperature Pulse Respirations Blood Pressure SpO2   99 1 °F (37 3 °C) 97 18 163/61 97 %      Temp Source Heart Rate Source Patient Position - Orthostatic VS BP Location FiO2 (%)   Oral Monitor Sitting Right arm --      Pain Score       9        Wt Readings from Last 1 Encounters:   01/23/19 (!) 171 kg (376 lb 15 8 oz)   Pertinent Labs/Diagnostic Test Results:   CT Lumbar spine:    1  No acute osseous injury        2  Right subarticular disc protrusion at level L4-5 causing mild right foraminal narrowing without significant canal stenosis    No spinal canal or foraminal stenosis in other levels      WBC 10 59 (H) 4 31 - 10 16 Thousand/uL     RBC 5 31 3 88 - 5 62 Million/uL     Hemoglobin 15 5 12 0 - 17 0 g/dL     Hematocrit 48 8 36 5 - 49 3 %       Sodium 132 (L) 136 - 145 mmol/L     Potassium 5 0 3 5 - 5 3 mmol/L     Chloride 103 100 - 108 mmol/L     CO2 24 21 - 32 mmol/L     ANION GAP 5 4 - 13 mmol/L     BUN 15 5 - 25 mg/dL     Creatinine 0 85 0 60 - 1 30 mg/dL     Comment: Standardized to IDMS reference method       Glucose 113 65 - 140 mg/dL     Comment:   If the patient is fasting, the ADA then defines impaired fasting glucose as > 100 mg/dL and diabetes as > or equal to 123 mg/dL  Specimen collection should occur prior to Sulfasalazine administration due to the potential for falsely depressed results  Specimen collection should occur prior to Sulfapyridine administration due to the potential for falsely elevated results  Calcium 9 6 8 3 - 10 1 mg/dL     eGFR 124 ml/min/1 73sq m            ED Treatment:   Medication Administration from 01/23/2019 1204 to 01/23/2019 1941       Date/Time Order Dose Route Action Action by Comments     01/23/2019 1221  EMS REPLENISHMENT MED 0  Does not apply Given to EMS Jessy Mar RN      01/23/2019 1221 morphine 2 mg/mL injection **ADS Override Pull** 0   Given to EMS Jessy Mar RN      01/23/2019 1222 morphine (PF) 4 mg/mL injection **ADS Override Pull** 0   Given to EMS Jessy Mar RN      01/23/2019 1500 morphine (PF) 10 mg/mL injection 6 mg 6 mg Intravenous Given Stephanie Hammonds, RN      01/23/2019 1501 ketorolac (TORADOL) injection 15 mg 15 mg Intravenous Given Stephanie Hammonds, RN      01/23/2019 1501 lidocaine (LIDODERM) 5 % patch 1 patch 1 patch Topical Medication Applied Stephanie Hammonds RN      01/23/2019 1501 methocarbamol (ROBAXIN) tablet 500 mg 500 mg Oral Given Stephanie Hammonds, RN      01/23/2019 1719 HYDROmorphone (DILAUDID) injection 0 5 mg 0 5 mg Intravenous Given Stephanie Hammonds RN      01/23/2019 1825 ondansetron (ZOFRAN) injection 4 mg 4 mg Intravenous Given Jessy Mar RN         Past Medical/Surgical History:    Active Ambulatory Problems     Diagnosis Date Noted    Testicle pain 06/12/2018     Past Medical History:   Diagnosis Date    Back pain     Testicle pain      Admitting Diagnosis: Back pain [M54 9]  Radiculopathy of lumbar region [M54 16]  Age/Sex: 26 y o  male  Assessment/Plan:   * Acute right lumbar radiculopathy Assessment & Plan     · Occurred while patient was on "light duty" at work moving a speaker  · CT L-spine revealed right subarticular disc protrusion at level L4-5 causing mild right foraminal narrowing without significant canal stenosis  No spinal canal or foraminal stenosis at other levels  · Follow-up MRI  Pending the result, consider neurosurgery consult  · Pain control - discussed with attending, will trial short steroid dose, start with Dexamethasone 4mg Q8hr today, then Q12hr tomorrow, then taper to PO  · PT consult         VTE Prophylaxis: patient is low-risk per admission screen  Anticipated Length of Stay:  Patient will be admitted on an Observation basis with an anticipated length of stay of  < 2 midnights     Justification for Hospital Stay: acute lumbar and hip pain following injury, for MRI, pending results, pain control, PT eval      Admission Orders:  Scheduled Meds:   Current Facility-Administered Medications:  acetaminophen 975 mg Oral Q8H [address]   dexamethasone 4 mg Intravenous Q8H [address]   ketorolac 15 mg Intravenous Q6H PRN   lidocaine 2 patch Topical Once   menthol-methyl salicylate  Apply externally 4x Daily PRN   methocarbamol 750 mg Oral Q6H SANDY   ondansetron 4 mg Intravenous Q8H PRN x 1 dose   oxyCODONE 5 mg Oral Q4H PRN x 2 doses   polyethylene glycol 17 g Oral Daily     MRI - T Spine:  Pending  Regular diet  activity as tolerated  Consult PT  Consult NeuroSurgery

## 2019-01-24 NOTE — CONSULTS
Consultation - Neurosurgery   Jered Jade 26 y o  male MRN: [id number]  Unit/Bed#: -01 Encounter: [id number]    Images reviewed at morning rounds on  01/24/2019 at 8:00 a m  Patient was seen and examined on 01/24/2019 at 9:15 a m  Inpatient consult to Neurosurgery  Consult performed by: Cely Cates ordered by: Asha Das          Assessment/Plan               Assessment:  1  Acute lower back pain with right thigh radiculopathy  2  L4-5 foraminal lateral disc protrusion  3  Morbid obesity  4  Rule out possible traumatic myofascitis      Plan:   · Exam:  GCS 15, A&OX3,PERRL, EOMI, 2mm, conjugate, SF, PETTIT except right LE weakness-most of it related to pain inhibition, positive SLR test on the right leg, and also LLE SLR test it seat pain across his right groin area, right hip flexion 4-/5, right PF/DF 3+/5,   Left HF, feet DF/PF 5/5  Sensation to light touch and pinprick normal throughout  DTR 2+ bilaterally  No clonus and Babinski is negative symmetric  Moderate to severe tenderness at L4-5 region  · Images:  MRI of lumbar spine demonstrates mild right L4-5 foraminal/lateral disc protrusion ( annular fissure tear), possibly touching the exiting L4 nerve root  Facet arthrosis at L4-5 and L5-S1 bilaterally  · CT of lumbar spine on 01/23/2019 demonstrates subarticular disc protrusion at L4-5 causing mild right NFN without significant canal stenosis      · Pain control:  Per primary team  · Consider APS evaluation treatment, if pain becomes worse; pain management  · DVT ppx:   · SCDs-bilateral legs  · Okay to start pharmacological DVT prophylaxis from Neurosurgery perspective  · Activity:  As tolerated-avoid excessive twisting, bending or lifting heavy objects (>5-10 lbs)  · PT/OT evaluation & treatment  · Brace:  None  · Medical Mx:  Per primary team  · Neurocheck:  Routine  · Patient has right LE pain with difficulty lifting, positive SLR test more on the right leg, decreased DF/PF in the right foot, most likely related to pain inhibition  Tenderness at L4-5 region  MRI of lumbar spine mild right L4-5 foraminal lateral disc protrusion  At this juncture, no strong indication for surgical procedure  Patient could also have concomitant traumatic myofascial strain  Continue with conservative treatment including PT/OT, pain control, recommend APS & Pain Mx  Follow up p r n  at outpatient Neurosurgery Clinic  Will sign off  Call for concern or problem  HPI: Yadiel Philip is a 26y o  year old male with relatively healthy except morbid obesity, admitted with acute back pain with right leg radiculopathy  Patient reported twisting his back when he tries to lift heavy object and  fell down hitting his back  Started having very shooting down lower back and right hip and including right thigh pains  Has numbness on the right anterolateral thigh initially but that subsided now, patient also claims weakness and difficulty getting up and walking due to pain in the back that radiates to his right hip and thigh  Patient admits both lower back  and shooting right hip and right thigh pain intensity are the same  He  denies any tingling sensation in the legs  Denies saddle anesthesia or bowel or bladder issues  He is very active works at [employer]  Patient had also history of upper back and neck pain  about 2 months ago that improved was physical therapy  Denies any gait issues or tendency to fall, does not use cane or walker  Patient denies history of fever, chills, rigors, cough or chest pain  Denies history of hypertension, diabetes mellitus, congestive heart failure, seizure, stroke,  bleeding disorder or taking anticoagulant medications  Patient denies history of smoking or drinking alcohol  His CT of lumbar spine demonstratessubarticular disc protrusion at L4-5 causing mild right NFN without significant canal stenosis    MRI of lumbar spine also shows mild right L4-5 foraminal/lateral disc protrusion ( annular fissure tear), possibly touching the exiting L4 nerve root  Facet arthrosis at L4-5 and L5-S1 bilaterally  Review of Systems   Constitutional: Positive for activity change  Negative for chills, fatigue and fever  HENT: Negative for trouble swallowing and voice change  Eyes: Negative for photophobia and visual disturbance  Respiratory: Negative for apnea, cough, chest tightness and stridor  Cardiovascular: Negative for chest pain, palpitations and leg swelling  Gastrointestinal: Negative for diarrhea, nausea and vomiting  Genitourinary: Negative for difficulty urinating  Musculoskeletal: Positive for neck stiffness  Negative for arthralgias, back pain, gait problem and neck pain  Skin: Negative for wound  Neurological: Positive for weakness and numbness  Negative for dizziness, tremors, seizures, syncope, facial asymmetry, speech difficulty, light-headedness and headaches  Hematological: Does not bruise/bleed easily  Psychiatric/Behavioral: Negative for confusion and decreased concentration  Historical Information   Past Medical History:   Diagnosis Date    Back pain     Testicle pain      History reviewed  No pertinent surgical history    History   Alcohol Use    Yes     Comment: socially     History   Drug Use No     History   Smoking Status    Never Smoker   Smokeless Tobacco    Never Used     Family History   Problem Relation Age of Onset    Stroke Father     Diabetes Mother        Meds/Allergies   all current active meds have been reviewed and current meds:   Current Facility-Administered Medications   Medication Dose Route Frequency    acetaminophen (TYLENOL) tablet 975 mg  975 mg Oral Q8H [address]    dexamethasone (DECADRON) injection 4 mg  4 mg Intravenous Q8H [address]    ketorolac (TORADOL) injection 15 mg  15 mg Intravenous Q6H PRN    lidocaine (LIDODERM) 5 % patch 2 patch  2 patch Topical Once    menthol-methyl salicylate (BENGAY) 10-15 % cream   Apply externally 4x Daily PRN    methocarbamol (ROBAXIN) tablet 750 mg  750 mg Oral Q6H [address]    ondansetron (ZOFRAN) injection 4 mg  4 mg Intravenous Q8H PRN    oxyCODONE (ROXICODONE) IR tablet 5 mg  5 mg Oral Q4H PRN    polyethylene glycol (MIRALAX) packet 17 g  17 g Oral Daily     Allergies   Allergen Reactions    Shellfish Allergy        Objective   I/O       01/22 0701 - 01/23 0700 01/23 0701 - 01/24 0700 01/24 0701 - 01/25 0700    P  O   360 120    Total Intake(mL/kg)  360 (2 1) 120 (0 7)    Urine (mL/kg/hr)  775     Total Output   775      Net   -415 +120                 Physical Exam   Constitutional: He is oriented to person, place, and time  He appears well-developed and well-nourished  HENT:   Head: Normocephalic and atraumatic  Eyes: Pupils are equal, round, and reactive to light  Conjunctivae and EOM are normal    Neck: Normal range of motion  Slight stiffness during AROM   Cardiovascular: Normal rate and regular rhythm  Pulmonary/Chest: Effort normal and breath sounds normal    Abdominal: Soft  Musculoskeletal: Normal range of motion  Neurological: He is alert and oriented to person, place, and time  He has normal reflexes  He has a normal Finger-Nose-Finger Test  GCS eye subscore is 4  GCS verbal subscore is 5  GCS motor subscore is 6  He displays no Babinski's sign on the right side  He displays no Babinski's sign on the left side  Reflex Scores:       Tricep reflexes are 2+ on the right side and 2+ on the left side  Bicep reflexes are 2+ on the right side and 2+ on the left side  Brachioradialis reflexes are 2+ on the right side and 2+ on the left side  Patellar reflexes are 2+ on the right side and 2+ on the left side  Achilles reflexes are 2+ on the right side and 2+ on the left side  Skin: Skin is warm  Psychiatric: His speech is normal      Neurologic Exam     Mental Status   Oriented to person, place, and time     Oriented to person  Oriented to place  Oriented to country, city and area  Oriented to year, month and date  Registration: recalls 3 of 3 objects  Recall at 5 minutes: recalls 3 of 3 objects  Attention: normal  Concentration: normal    Speech: speech is normal   Knowledge: good  Able to perform simple calculations  Able to name object  Cranial Nerves     CN II   Visual fields full to confrontation  CN III, IV, VI   Pupils are equal, round, and reactive to light  Extraocular motions are normal    Nystagmus: none     CN V   Right facial sensation deficit: none  Left facial sensation deficit: none    CN VII   Right facial weakness: none  Left facial weakness: none    CN XI   CN XI normal    Right sternocleidomastoid strength: normal  Left sternocleidomastoid strength: normal    CN XII   CN XII normal      Motor Exam   Overall muscle tone: normal  Right arm tone: normal  Left arm tone: normal  Right arm pronator drift: absent  Left arm pronator drift: absent  Right leg tone: normal  Left leg tone: normalRight LE hip flexion 4-/5, PF/DF 3+/5, Positive SLR test in the right leg     Sensory Exam   Light touch normal    Pinprick normal      Gait, Coordination, and Reflexes     Coordination   Finger to nose coordination: normal    Tremor   Resting tremor: absent    Reflexes   Right brachioradialis: 2+  Left brachioradialis: 2+  Right biceps: 2+  Left biceps: 2+  Right triceps: 2+  Left triceps: 2+  Right patellar: 2+  Left patellar: 2+  Right achilles: 2+  Left achilles: 2+  Right : 2+  Left : 2+  Right plantar: equivocal  Left plantar: equivocal  Right Perez: absent  Right ankle clonus: absent  Left ankle clonus: absent      Vitals:Blood pressure 126/68, pulse 81, temperature 98 9 °F (37 2 °C), temperature source Oral, resp  rate 20, height 6' 1" (1 854 m), weight (!) 171 kg (376 lb 15 8 oz), SpO2 95 %  ,Body mass index is 49 74 kg/m²       Lab Results:         Invalid input(s):  EOSPCT    Results from last 7 days  Lab Units 01/24/19  0529   POTASSIUM mmol/L 5 0   CHLORIDE mmol/L 103   CO2 mmol/L 24   BUN mg/dL 15   CREATININE mg/dL 0 85   CALCIUM mg/dL 9 6                 No results found for: TROPONINT  ABG:No results found for: PHART, IMT7FTD, PO2ART, XVD3SLG, K0QERSUM, BEART, SOURCE    Imaging Studies: I have personally reviewed pertinent reports  and I have personally reviewed pertinent films in PACS    EKG, Pathology, and Other Studies: I have personally reviewed pertinent reports  and I have personally reviewed pertinent films in PACS    VTE Prophylaxis: Sequential compression device (Venodyne)     Code Status: Level 1 - Full Code  Advance Directive and Living Will:      Power of :    POLST:      Counseling / Coordination of Care  I spent 20 minutes with the patient

## 2019-01-24 NOTE — PLAN OF CARE
DISCHARGE PLANNING     Discharge to home or other facility with appropriate resources Progressing        INFECTION - ADULT     Absence or prevention of progression during hospitalization Progressing        Knowledge Deficit     Patient/family/caregiver demonstrates understanding of disease process, treatment plan, medications, and discharge instructions Progressing        MUSCULOSKELETAL - ADULT     Maintain or return mobility to safest level of function Progressing        NEUROSENSORY - ADULT     Achieves stable or improved neurological status Progressing     Achieves maximal functionality and self care Progressing        PAIN - ADULT     Verbalizes/displays adequate comfort level or baseline comfort level Progressing        Potential for Falls     Patient will remain free of falls Progressing        Prexisting or High Potential for Compromised Skin Integrity     Skin integrity is maintained or improved Progressing        SAFETY ADULT     Patient will remain free of falls Progressing     Maintain or return to baseline ADL function Progressing     Maintain or return mobility status to optimal level Progressing

## 2019-01-25 NOTE — UTILIZATION REVIEW
This is a Notification of Inpatient Admission/Request for Inpatient Authorization for our facility [facility]  Be advised that this patient was admitted to our facility under Observation Status  Please contact the Utilization Review Department where the patient is receiving care services for additional admission information  Place of Service Code: 25  Place of Service Name: On [facility]  CPT Code for Observation:     Presentation Date & Time: 1/23/2019 12:05 PM  Observation Admission Date & Time:  Hours in Observation:   01/23/2019 17:06 Patient Update BE ED Observation General Medicine Med Surg M/S [facility]   01/23/2019 19:12 Transfer Out BE ED Observation General Medicine Med Surg M/S [facility]   01/23/2019 19:12 Transfer In BE ED Observation General Medicine Med Surg M/S [facility]   01/23/2019 19:41 Transfer Out BE ED Observation General Medicine Med Surg M/S [facility]   01/23/2019 19:41 Transfer In BE MED SURG 4 Observation Hospitalist Med Surg M/S [facility]   01/24/2019 18:35 Discharge BE MED SURG 4 Observation Hospitalist Med Surg M/S [facility]     Discharge Date & Time: 1/24/2019  6:35 PM   Discharge Disposition (if discharged): Home/Self Care  Attending Physician: Duane Guerra, [address] [[id number]]  Admission Orders     Ordered        01/23/19 1706  Place in Observation (expected length of stay for this patient is less than two midnights)  Once             Facility: [address])  Network Utilization Review Department  Phone: [phone]; Fax [phone]  [email or web address]  ATTENTION: Please call with any questions or concerns to [phone]  and carefully listen to the prompts so that you are directed to the right person  Send all requests for admission clinical reviews, approved or denied determinations and any other requests to fax [phone]   All voicemails are confidential

## 2019-07-18 ENCOUNTER — HOSPITAL ENCOUNTER (EMERGENCY)
Facility: HOSPITAL | Age: 24
Discharge: HOME | End: 2019-07-18
Attending: EMERGENCY MEDICINE | Admitting: EMERGENCY MEDICINE
Payer: COMMERCIAL

## 2019-07-18 ENCOUNTER — OFFICE VISIT (OUTPATIENT)
Dept: URGENT CARE | Facility: MEDICAL CENTER | Age: 24
End: 2019-07-18
Payer: COMMERCIAL

## 2019-07-18 VITALS
HEART RATE: 96 BPM | SYSTOLIC BLOOD PRESSURE: 148 MMHG | OXYGEN SATURATION: 96 % | DIASTOLIC BLOOD PRESSURE: 76 MMHG | BODY MASS INDEX: 49.48 KG/M2 | TEMPERATURE: 99.3 F | WEIGHT: 315 LBS | RESPIRATION RATE: 18 BRPM

## 2019-07-18 VITALS
HEART RATE: 86 BPM | SYSTOLIC BLOOD PRESSURE: 120 MMHG | TEMPERATURE: 98.3 F | DIASTOLIC BLOOD PRESSURE: 72 MMHG | WEIGHT: 315 LBS | OXYGEN SATURATION: 96 % | HEIGHT: 73 IN | RESPIRATION RATE: 16 BRPM | BODY MASS INDEX: 41.75 KG/M2

## 2019-07-18 DIAGNOSIS — L03.90 CELLULITIS: Primary | ICD-10-CM

## 2019-07-18 DIAGNOSIS — L02.419 CELLULITIS AND ABSCESS OF LEG, EXCEPT FOOT: Primary | ICD-10-CM

## 2019-07-18 DIAGNOSIS — L03.119 CELLULITIS AND ABSCESS OF LEG, EXCEPT FOOT: Primary | ICD-10-CM

## 2019-07-18 PROCEDURE — 99284 EMERGENCY DEPT VISIT MOD MDM: CPT | Performed by: EMERGENCY MEDICINE

## 2019-07-18 PROCEDURE — G0382 LEV 3 HOSP TYPE B ED VISIT: HCPCS | Performed by: NURSE PRACTITIONER

## 2019-07-18 PROCEDURE — 99283 EMERGENCY DEPT VISIT LOW MDM: CPT

## 2019-07-18 PROCEDURE — 96372 THER/PROPH/DIAG INJ SC/IM: CPT

## 2019-07-18 PROCEDURE — S9083 URGENT CARE CENTER GLOBAL: HCPCS | Performed by: NURSE PRACTITIONER

## 2019-07-18 RX ORDER — DOXYCYCLINE HYCLATE 100 MG/1
100 CAPSULE ORAL ONCE
Status: COMPLETED | OUTPATIENT
Start: 2019-07-18 | End: 2019-07-18

## 2019-07-18 RX ORDER — DOXYCYCLINE HYCLATE 100 MG/1
100 CAPSULE ORAL 2 TIMES DAILY
Qty: 20 CAPSULE | Refills: 0 | Status: SHIPPED | OUTPATIENT
Start: 2019-07-18 | End: 2019-07-18 | Stop reason: SDUPTHER

## 2019-07-18 RX ORDER — NAPROXEN 500 MG/1
500 TABLET ORAL 2 TIMES DAILY WITH MEALS
Qty: 10 TABLET | Refills: 0 | Status: SHIPPED | OUTPATIENT
Start: 2019-07-18 | End: 2019-07-18 | Stop reason: SDUPTHER

## 2019-07-18 RX ORDER — DOXYCYCLINE HYCLATE 100 MG/1
100 CAPSULE ORAL 2 TIMES DAILY
Qty: 20 CAPSULE | Refills: 0 | Status: SHIPPED | OUTPATIENT
Start: 2019-07-18 | End: 2019-07-28

## 2019-07-18 RX ORDER — NAPROXEN 500 MG/1
500 TABLET ORAL 2 TIMES DAILY WITH MEALS
Qty: 10 TABLET | Refills: 0 | Status: SHIPPED | OUTPATIENT
Start: 2019-07-18 | End: 2021-02-15 | Stop reason: ALTCHOICE

## 2019-07-18 RX ORDER — KETOROLAC TROMETHAMINE 30 MG/ML
15 INJECTION, SOLUTION INTRAMUSCULAR; INTRAVENOUS ONCE
Status: COMPLETED | OUTPATIENT
Start: 2019-07-18 | End: 2019-07-18

## 2019-07-18 RX ADMIN — KETOROLAC TROMETHAMINE 15 MG: 30 INJECTION, SOLUTION INTRAMUSCULAR at 11:16

## 2019-07-18 RX ADMIN — DOXYCYCLINE 100 MG: 100 CAPSULE ORAL at 11:16

## 2019-07-18 NOTE — PROGRESS NOTES
[email or web address] [facility]        NAME: Saintclair Ditty is a 26 y o  male  : 1995    MRN: [id number]  DATE: 2019  TIME: 10:23 AM    Assessment and Plan   Cellulitis and abscess of leg, except foot [L03 119, L02 419]  1  Cellulitis and abscess of leg, except foot  Transfer to other facility         Patient Instructions   At this time, patient failing oral antibiotic treatment, wound continues to progress with redness and pain  Recommend patient go to nearest ED for further evaluation at this time  Left office in stable condition at [address] to [place] [address]  Chief Complaint     Chief Complaint   Patient presents with    Abscess     Started on Monday with a "bug bite" to left upper thigh  Started to become red and hard with yellow thick discharge  Did TeleDoc two days ago and was prescribed oral antibiotic (Bactrim)  Has been taking antibiotic with worsening symptoms  Denies fevers  History of Present Illness       Patient presents in office with redness/swelling of L lateral thigh  Reports last Thursday was mowing the lawn, and after noticed redness and burning of area  Last Friday, was able to express pus from wound  Area has been growing in redness  About two days ago, did a video chat with physician and was placed on bactrim and directed to encircle wound  Redness has been expanding outside area  Denies fever, chills, N/V/D, body aches  No prn meds  Review of Systems   Review of Systems   Constitutional: Negative for chills, fatigue and fever  Respiratory: Negative  Cardiovascular: Negative  Musculoskeletal: Negative for myalgias  Skin: Positive for wound  Negative for rash           Current Medications       Current Outpatient Medications:     Sulfamethoxazole-Trimethoprim (BACTRIM PO), Take by mouth, Disp: , Rfl:     acetaminophen (TYLENOL) 325 mg tablet, Take 3 tablets (975 mg total) by mouth 3 (three) times a day (Patient not taking: Reported on 2019), Disp: 30 tablet, Rfl: 0    gabapentin (NEURONTIN) 300 mg capsule, Take 1 capsule (300 mg total) by mouth daily at bedtime (Patient not taking: Reported on 7/18/2019), Disp: 30 capsule, Rfl: 0    lidocaine (LIDODERM) 5 %, Apply 1 patch topically daily Remove & Discard patch within 12 hours or as directed by MD (Patient not taking: Reported on 7/18/2019), Disp: 30 patch, Rfl: 0    meloxicam (MOBIC) 15 mg tablet, Take 15 mg by mouth daily, Disp: , Rfl:     methocarbamol (ROBAXIN) 500 mg tablet, Take 2 tablets (1,000 mg total) by mouth every 6 (six) hours for 14 days, Disp: 112 tablet, Rfl: 0    methylPREDNISolone 4 MG tablet therapy pack, Use as directed on package (Patient not taking: Reported on 7/18/2019), Disp: 21 tablet, Rfl: 0    Current Allergies     Allergies as of 07/18/2019 - Reviewed 07/18/2019   Allergen Reaction Noted    Shellfish allergy Anaphylaxis 09/13/2017            The following portions of the patient's history were reviewed and updated as appropriate: allergies, current medications, past family history, past medical history, past social history, past surgical history and problem list      Past Medical History:   Diagnosis Date    Back pain     Testicle pain        History reviewed  No pertinent surgical history  Family History   Problem Relation Age of Onset    Stroke Father     Cancer Father     Diabetes Mother          Medications have been verified  Objective   /72 (BP Location: Right arm, Patient Position: Sitting)   Pulse 86   Temp 98 3 °F (36 8 °C) (Tympanic)   Resp 16   Ht 6' 1" (1 854 m)   Wt (!) 176 kg (387 lb)   SpO2 96%   BMI 51 06 kg/m²        Physical Exam     Physical Exam   Constitutional: He is oriented to person, place, and time  He appears well-developed and well-nourished  No distress  HENT:   Head: Normocephalic and atraumatic  Eyes: Pupils are equal, round, and reactive to light     Cardiovascular: Normal rate, regular rhythm, normal heart sounds and intact distal pulses  Pulmonary/Chest: Effort normal and breath sounds normal    Musculoskeletal: Normal range of motion  He exhibits no edema or tenderness  Legs:  Neurological: He is alert and oriented to person, place, and time  Skin: Skin is warm and dry  No rash noted  He is not diaphoretic  Psychiatric: He has a normal mood and affect  His behavior is normal    Nursing note and vitals reviewed  I have reviewed the student's history and physical, I have personally examined the patient and agree with the above stated findings and plan

## 2019-07-18 NOTE — ED PROVIDER NOTES
History  Chief Complaint   Patient presents with    Cellulitis     last friday started with Red area to L thigh  Started ABX monday  red area spredding, painful, warm to touch  HPI   Patient presents with redness and some pain to the left lateral aspect proximal to the knee  Patient states on Monday he was mowing grass and felt like there was something that bit him  Patient states that the pain currently is a 5/10 with palpation becomes a 7/10  It has been spreading slowly  Patient was started on Bactrim  Patient has been taking this for 3 total days  Patient states that the redness has been getting worse  Patient states the pain is getting worse with still is moderate  No fever chills rigors  Limited past medical history  Patient denies other symptoms at this time  Patient denies past medical history  Patient denies motor weakness numbness or tingling  Patient denies neck pain neck stiffness chest pain palpitations shortness of breath cough pleurisy hemoptysis abdominal pain nausea vomiting diarrhea constipation urinary symptoms motor weakness numbness and tingling  Prior to Admission Medications   Prescriptions Last Dose Informant Patient Reported? Taking?    Sulfamethoxazole-Trimethoprim (BACTRIM PO) 7/18/2019 at Unknown time  Yes Yes   Sig: Take by mouth   acetaminophen (TYLENOL) 325 mg tablet Past Week at Unknown time  No Yes   Sig: Take 3 tablets (975 mg total) by mouth 3 (three) times a day   gabapentin (NEURONTIN) 300 mg capsule   No No   Sig: Take 1 capsule (300 mg total) by mouth daily at bedtime   Patient not taking: Reported on 7/18/2019   lidocaine (LIDODERM) 5 %   No No   Sig: Apply 1 patch topically daily Remove & Discard patch within 12 hours or as directed by MD   Patient not taking: Reported on 7/18/2019   meloxicam (MOBIC) 15 mg tablet   Yes No   Sig: Take 15 mg by mouth daily   methocarbamol (ROBAXIN) 500 mg tablet   No No   Sig: Take 2 tablets (1,000 mg total) by mouth every 6 (six) hours for 14 days   methylPREDNISolone 4 MG tablet therapy pack   No No   Sig: Use as directed on package   Patient not taking: Reported on 7/18/2019      Facility-Administered Medications: None       Past Medical History:   Diagnosis Date    Back pain     Testicle pain        History reviewed  No pertinent surgical history  Family History   Problem Relation Age of Onset    Stroke Father     Cancer Father     Diabetes Mother      I have reviewed and agree with the history as documented  Social History     Tobacco Use    Smoking status: Never Smoker    Smokeless tobacco: Never Used   Substance Use Topics    Alcohol use: Yes     Comment: socially    Drug use: No        Review of Systems   Constitutional: Negative for activity change, appetite change, chills, diaphoresis, fatigue, fever and unexpected weight change  HENT: Negative for congestion, ear discharge, ear pain, facial swelling, hearing loss, nosebleeds, postnasal drip, rhinorrhea, sinus pressure, sneezing, sore throat, tinnitus and trouble swallowing  Eyes: Negative for photophobia, pain, redness, itching and visual disturbance  Respiratory: Negative for cough, chest tightness, shortness of breath, wheezing and stridor  Cardiovascular: Negative for chest pain, palpitations and leg swelling  Gastrointestinal: Negative for abdominal distention, abdominal pain, anal bleeding, blood in stool, constipation, diarrhea, nausea and vomiting  Endocrine: Negative for cold intolerance, heat intolerance, polydipsia, polyphagia and polyuria  Genitourinary: Negative for decreased urine volume, difficulty urinating, dysuria, enuresis, flank pain, frequency, hematuria and urgency  Musculoskeletal: Negative for arthralgias, back pain, gait problem, joint swelling, myalgias, neck pain and neck stiffness  Skin: Positive for rash  Negative for wound     Allergic/Immunologic: Negative for environmental allergies, food allergies and immunocompromised state  Neurological: Negative for dizziness, tremors, seizures, syncope, facial asymmetry, speech difficulty, weakness, light-headedness, numbness and headaches  Hematological: Negative for adenopathy  Psychiatric/Behavioral: Negative for agitation, behavioral problems, confusion, decreased concentration, dysphoric mood, hallucinations, self-injury, sleep disturbance and suicidal ideas  The patient is not nervous/anxious and is not hyperactive  Physical Exam  Physical Exam   Constitutional: He is oriented to person, place, and time  He appears well-developed and well-nourished  No distress  HENT:   Head: Normocephalic and atraumatic  Right Ear: External ear normal    Left Ear: External ear normal    Nose: Nose normal    Mouth/Throat: Oropharynx is clear and moist  No oropharyngeal exudate  Eyes: Pupils are equal, round, and reactive to light  Conjunctivae and EOM are normal  Right eye exhibits no discharge  Left eye exhibits no discharge  No scleral icterus  Neck: Normal range of motion  Neck supple  No JVD present  No tracheal deviation present  No thyromegaly present  Cardiovascular: Normal rate, regular rhythm, normal heart sounds and intact distal pulses  No murmur heard  Pulmonary/Chest: Effort normal and breath sounds normal  No stridor  No respiratory distress  He has no wheezes  He has no rales  Abdominal: Soft  Bowel sounds are normal  He exhibits no distension and no mass  There is no tenderness  There is no rebound and no guarding  Musculoskeletal: Normal range of motion  He exhibits no edema, tenderness or deformity  Legs:  Lymphadenopathy:     He has no cervical adenopathy  Neurological: He is alert and oriented to person, place, and time  No cranial nerve deficit  He exhibits normal muscle tone  Coordination normal    Skin: Skin is warm and dry  No rash noted  He is not diaphoretic  No erythema  Psychiatric: He has a normal mood and affect   His behavior is normal  Judgment and thought content normal    Nursing note and vitals reviewed  Vital Signs  ED Triage Vitals   Temperature Pulse Respirations Blood Pressure SpO2   07/18/19 1103 07/18/19 1103 07/18/19 1103 07/18/19 1105 07/18/19 1103   99 3 °F (37 4 °C) 96 18 148/76 96 %      Temp Source Heart Rate Source Patient Position - Orthostatic VS BP Location FiO2 (%)   07/18/19 1103 -- -- -- --   Tympanic          Pain Score       07/18/19 1103       7           Vitals:    07/18/19 1103 07/18/19 1105   BP:  148/76   Pulse: 96          Visual Acuity      ED Medications  Medications   ketorolac (TORADOL) injection 15 mg (15 mg Intramuscular Given 7/18/19 1116)   doxycycline hyclate (VIBRAMYCIN) capsule 100 mg (100 mg Oral Given 7/18/19 1116)       Diagnostic Studies  Results Reviewed     None                 No orders to display              Procedures  Procedures       ED Course                               MDM  Number of Diagnoses or Management Options  Cellulitis:   Diagnosis management comments: Patient presents with cellulitis  On exam patient's pain is mild  Appears to be cellulitic  Bedside ultrasound does not show any fluid collection doubt abscess doubt cellulitis with abscess  Impressions cellulitis  Considered other diagnosis is such as necrotizing wound does not thick clinical course  Patient is not diabetic  Patient will be started on doxycycline for broader coverage  Patient will cane cleats course of doxycycline, Naprosyn for pain  Patient will follow up with PCP at the beginning of week for wound check  Patient agrees to follow-up care strict ED return precautions discussed he demonstrates understanding         Amount and/or Complexity of Data Reviewed  Obtain history from someone other than the patient: yes  Review and summarize past medical records: yes        Disposition  Final diagnoses:   Cellulitis     Time reflects when diagnosis was documented in both MDM as applicable and the Disposition within this note     Time User Action Codes Description Comment    7/18/2019 11:36 AM RandyBijan spainmarilyn [facility] Add [L03 90] Cellulitis       ED Disposition     ED Disposition Condition Date/Time Comment    Discharge Stable Thu Jul 18, 2019 11:36 AM Emmanuel Head discharge to home/self care              Follow-up Information     Follow up With Specialties Details Why Contact Info Additional [address], [address], DO Internal Medicine Go in 3 days For wound check [address] [facility] Emergency Department Emergency Medicine Go to  As needed, If symptoms worsen [address]  [phone]  ED, [address] [place] [address], [id number]          Discharge Medication List as of 7/18/2019 11:37 AM      START taking these medications    Details   doxycycline hyclate (VIBRAMYCIN) 100 mg capsule Take 1 capsule (100 mg total) by mouth 2 (two) times a day for 10 days, Starting u 7/18/2019, Until Sun 7/28/2019, Normal      naproxen (NAPROSYN) 500 mg tablet Take 1 tablet (500 mg total) by mouth 2 (two) times a day with meals for 5 days, Starting Thu 7/18/2019, Until Tue 7/23/2019, Normal         CONTINUE these medications which have NOT CHANGED    Details   acetaminophen (TYLENOL) 325 mg tablet Take 3 tablets (975 mg total) by mouth 3 (three) times a day, Starting Thu 1/24/2019, No Print      Sulfamethoxazole-Trimethoprim (BACTRIM PO) Take by mouth, Historical Med      gabapentin (NEURONTIN) 300 mg capsule Take 1 capsule (300 mg total) by mouth daily at bedtime, Starting Thu 1/24/2019, Print      lidocaine (LIDODERM) 5 % Apply 1 patch topically daily Remove & Discard patch within 12 hours or as directed by MD, Starting Fri 1/25/2019, No Print      meloxicam (MOBIC) 15 mg tablet Take 15 mg by mouth daily, Historical Med      methocarbamol (ROBAXIN) 500 mg tablet Take 2 tablets (1,000 mg total) by mouth every 6 (six) hours for 14 days, Starting Fri 1/25/2019, Until Fri 2/8/2019, Print      methylPREDNISolone 4 MG tablet therapy pack Use as directed on package, Print           No discharge procedures on file      ED Provider  Electronically Signed by           Olaf Price DO  07/19/19 1027

## 2020-06-19 ENCOUNTER — APPOINTMENT (OUTPATIENT)
Dept: RADIOLOGY | Age: 25
End: 2020-06-19
Attending: PHYSICIAN ASSISTANT
Payer: COMMERCIAL

## 2020-06-19 ENCOUNTER — OFFICE VISIT (OUTPATIENT)
Dept: URGENT CARE | Age: 25
End: 2020-06-19
Payer: COMMERCIAL

## 2020-06-19 VITALS
HEART RATE: 80 BPM | DIASTOLIC BLOOD PRESSURE: 88 MMHG | BODY MASS INDEX: 41.75 KG/M2 | RESPIRATION RATE: 18 BRPM | HEIGHT: 73 IN | WEIGHT: 315 LBS | TEMPERATURE: 97.2 F | OXYGEN SATURATION: 98 % | SYSTOLIC BLOOD PRESSURE: 130 MMHG

## 2020-06-19 DIAGNOSIS — M79.641 HAND PAIN, RIGHT: Primary | ICD-10-CM

## 2020-06-19 DIAGNOSIS — M25.531 ACUTE PAIN OF RIGHT WRIST: ICD-10-CM

## 2020-06-19 DIAGNOSIS — T14.90XA INJURY: ICD-10-CM

## 2020-06-19 PROCEDURE — 73130 X-RAY EXAM OF HAND: CPT

## 2020-06-19 PROCEDURE — 73110 X-RAY EXAM OF WRIST: CPT

## 2020-06-19 PROCEDURE — S9083 URGENT CARE CENTER GLOBAL: HCPCS | Performed by: PHYSICIAN ASSISTANT

## 2020-06-19 PROCEDURE — G0382 LEV 3 HOSP TYPE B ED VISIT: HCPCS | Performed by: PHYSICIAN ASSISTANT

## 2020-06-19 RX ORDER — IBUPROFEN 200 MG
400 TABLET ORAL EVERY 6 HOURS PRN
COMMUNITY
End: 2021-02-15 | Stop reason: ALTCHOICE

## 2021-02-14 ENCOUNTER — APPOINTMENT (OUTPATIENT)
Dept: RADIOLOGY | Age: 26
End: 2021-02-14
Payer: COMMERCIAL

## 2021-02-14 ENCOUNTER — OFFICE VISIT (OUTPATIENT)
Dept: URGENT CARE | Age: 26
End: 2021-02-14
Payer: COMMERCIAL

## 2021-02-14 VITALS
HEIGHT: 73 IN | RESPIRATION RATE: 16 BRPM | HEART RATE: 93 BPM | BODY MASS INDEX: 41.75 KG/M2 | DIASTOLIC BLOOD PRESSURE: 72 MMHG | OXYGEN SATURATION: 97 % | WEIGHT: 315 LBS | TEMPERATURE: 98.4 F | SYSTOLIC BLOOD PRESSURE: 134 MMHG

## 2021-02-14 DIAGNOSIS — S66.911A HAND STRAIN, RIGHT, INITIAL ENCOUNTER: Primary | ICD-10-CM

## 2021-02-14 DIAGNOSIS — R52 PAIN: ICD-10-CM

## 2021-02-14 PROCEDURE — 99213 OFFICE O/P EST LOW 20 MIN: CPT | Performed by: NURSE PRACTITIONER

## 2021-02-14 PROCEDURE — 73130 X-RAY EXAM OF HAND: CPT

## 2021-02-14 NOTE — PROGRESS NOTES
[facility]        NAME: Brenda Mcdowell is a 26 y o  male  : 1995    MRN: [id number]  DATE: 2021  TIME: 4:36 PM    Assessment and Plan   Hand strain, right, initial encounter [S66 911A]  1  Hand strain, right, initial encounter  XR hand 3+ vw right         Patient Instructions     ACE wrap applied to patient's hand right  Establish with PCP ASAP   Follow up with PCP in 3-5 days for further eval  Proceed to  ER if symptoms worsen  Chief Complaint     Chief Complaint   Patient presents with    Hand Pain     began with pain in right hand 2-3 months ao    now worsening in last 3 wks    dropping things and having numbness         History of Present Illness       HPI   Reports he has been having pain on the R hand for about 2-3 months  Slowly getting worse in the last 2-3 weeks  Really got bad in the last couple of days  Says he has been unable to unscrew certain screws at work that was supposed to be easy  Also almost dropping objects at the job  Pain is minimal at rest, worse when using the right hand, or twisting it in certain ways, or pressing down on objects with the right hand positioned laterally  Review of Systems   Review of Systems   Constitutional: Negative for chills and fever  Cardiovascular: Negative for chest pain  Gastrointestinal: Negative for diarrhea and vomiting  Musculoskeletal: Positive for myalgias (right hand)  Skin: Negative for color change and wound  Neurological: Negative for weakness and numbness           Current Medications       Current Outpatient Medications:     acetaminophen (TYLENOL) 325 mg tablet, Take 3 tablets (975 mg total) by mouth 3 (three) times a day, Disp: 30 tablet, Rfl: 0    ibuprofen (MOTRIN) 200 mg tablet, Take 400 mg by mouth every 6 (six) hours as needed for mild pain, Disp: , Rfl:     Naproxen Sodium (ALEVE PO), Take by mouth, Disp: , Rfl:     gabapentin (NEURONTIN) 300 mg capsule, Take 1 capsule (300 mg total) by mouth daily at bedtime (Patient not taking: Reported on 2/14/2021), Disp: 30 capsule, Rfl: 0    lidocaine (LIDODERM) 5 %, Apply 1 patch topically daily Remove & Discard patch within 12 hours or as directed by MD (Patient not taking: Reported on 2/14/2021), Disp: 30 patch, Rfl: 0    meloxicam (MOBIC) 15 mg tablet, Take 15 mg by mouth daily, Disp: , Rfl:     methocarbamol (ROBAXIN) 500 mg tablet, Take 2 tablets (1,000 mg total) by mouth every 6 (six) hours for 14 days, Disp: 112 tablet, Rfl: 0    methylPREDNISolone 4 MG tablet therapy pack, Use as directed on package (Patient not taking: Reported on 7/18/2019), Disp: 21 tablet, Rfl: 0    naproxen (NAPROSYN) 500 mg tablet, Take 1 tablet (500 mg total) by mouth 2 (two) times a day with meals for 5 days, Disp: 10 tablet, Rfl: 0    Sulfamethoxazole-Trimethoprim (BACTRIM PO), Take by mouth, Disp: , Rfl:     Current Allergies     Allergies as of 02/14/2021 - Reviewed 02/14/2021   Allergen Reaction Noted    Shellfish allergy Anaphylaxis 09/13/2017    Other  06/19/2020            The following portions of the patient's history were reviewed and updated as appropriate: allergies, current medications, past family history, past medical history, past social history, past surgical history and problem list      Past Medical History:   Diagnosis Date    Back pain     Testicle pain        History reviewed  No pertinent surgical history  Family History   Problem Relation Age of Onset    Stroke Father     Cancer Father     Diabetes Mother     Cancer Mother          Medications have been verified  Objective   /72   Pulse 93   Temp 98 4 °F (36 9 °C)   Resp 16   Ht 6' 1" (1 854 m)   Wt (!) 177 kg (390 lb)   SpO2 97%   BMI 51 45 kg/m²   No LMP for male patient  Physical Exam     Physical Exam  Constitutional:       Appearance: He is not ill-appearing or diaphoretic     Musculoskeletal:         General: Tenderness (with palpation of the base of the 4th finger  minimal swelling noted  no skin discoloration   slightly decreased d/t pain) present  No swelling or deformity  Neurological:      Mental Status: He is alert

## 2021-02-15 ENCOUNTER — OFFICE VISIT (OUTPATIENT)
Dept: FAMILY MEDICINE CLINIC | Facility: CLINIC | Age: 26
End: 2021-02-15
Payer: COMMERCIAL

## 2021-02-15 VITALS
WEIGHT: 315 LBS | TEMPERATURE: 97 F | HEART RATE: 63 BPM | RESPIRATION RATE: 16 BRPM | OXYGEN SATURATION: 98 % | BODY MASS INDEX: 41.75 KG/M2 | SYSTOLIC BLOOD PRESSURE: 120 MMHG | HEIGHT: 73 IN | DIASTOLIC BLOOD PRESSURE: 66 MMHG

## 2021-02-15 DIAGNOSIS — M79.601 RIGHT ARM PAIN: ICD-10-CM

## 2021-02-15 DIAGNOSIS — M25.511 CHRONIC RIGHT SHOULDER PAIN: ICD-10-CM

## 2021-02-15 DIAGNOSIS — G89.29 CHRONIC RIGHT SHOULDER PAIN: ICD-10-CM

## 2021-02-15 DIAGNOSIS — R52 RADIATING PAIN: ICD-10-CM

## 2021-02-15 DIAGNOSIS — M25.441 SWELLING OF JOINT OF HAND, RIGHT: ICD-10-CM

## 2021-02-15 DIAGNOSIS — M25.59 PAIN IN OTHER JOINT: Primary | ICD-10-CM

## 2021-02-15 DIAGNOSIS — M79.641 RIGHT HAND PAIN: ICD-10-CM

## 2021-02-15 DIAGNOSIS — Z00.00 ANNUAL PHYSICAL EXAM: ICD-10-CM

## 2021-02-15 PROBLEM — J30.2 CHRONIC SEASONAL ALLERGIC RHINITIS: Status: ACTIVE | Noted: 2018-08-14

## 2021-02-15 PROCEDURE — 1036F TOBACCO NON-USER: CPT | Performed by: NURSE PRACTITIONER

## 2021-02-15 PROCEDURE — 3008F BODY MASS INDEX DOCD: CPT | Performed by: NURSE PRACTITIONER

## 2021-02-15 PROCEDURE — 99385 PREV VISIT NEW AGE 18-39: CPT | Performed by: NURSE PRACTITIONER

## 2021-02-15 RX ORDER — MELOXICAM 15 MG/1
15 TABLET ORAL DAILY
Qty: 30 TABLET | Refills: 1 | Status: SHIPPED | OUTPATIENT
Start: 2021-02-15 | End: 2022-01-18

## 2021-02-16 NOTE — PROGRESS NOTES
BMI Counseling: Body mass index is 51 96 kg/m²  The BMI is above normal  Nutrition recommendations include decreasing portion sizes, encouraging healthy choices of fruits and vegetables, decreasing fast food intake, consuming healthier snacks, limiting drinks that contain sugar, moderation in carbohydrate intake, increasing intake of lean protein, reducing intake of saturated and trans fat and reducing intake of cholesterol  Exercise recommendations include exercising 3-5 times per week  Patient referred to PCP due to patient being overweight  Assessment/Plan:    Patient presents in office to establish care  His previous provider has left her area  Today's complaint is a right hand and wrist pain she few months now   States that he is right-handed and does most of the  work with his right hand  He uses drill guns on daily basis and now is having weakness but that hand and swelling   Lately has had arm pain  Forearm and upper arm and limited ROM of his shoulder  Denies any injuries but he is having weakness especially when holding and working with the gun  He is also due for an annual wellness exam denies any medical issues per se however if he is obese she has his BMI currently is 51 96  He has not any blood work recently  Plan today is to establish care do an annual physical and follow-up on this right-handed pain and does weakness  he has been seen she is by urgent care she has x-rays have been done and negative for any fractures  however he was sent to PCP is to follow-up with an MRI and specialist follow-up      MRI of hand and Shoulder  ordered referral to [facility] provided   Denies any injuries that he is aware     He has a weak grasp and swelling around the knuckles  He has bee dropping things       Problem List Items Addressed This Visit     None      Visit Diagnoses     Pain in other joint    -  Primary    Relevant Medications    meloxicam (MOBIC) 15 mg tablet    Other Relevant Orders Ambulatory referral to Orthopedic Surgery    MRI hand right wo contrast    RADHA Screen w/ Reflex to Titer/Pattern    RF Screen w/ Reflex to Titer    Right hand pain        Relevant Orders    MRI hand right wo contrast    RADHA Screen w/ Reflex to Titer/Pattern    RF Screen w/ Reflex to Titer    BMI 50 0-59 9, adult (HCC)        Relevant Orders    Comprehensive metabolic panel    CBC and differential    TSH, 3rd generation    UA (URINE) with reflex to Scope    Lipid panel    Annual physical exam        Relevant Orders    Comprehensive metabolic panel    CBC and differential    TSH, 3rd generation    UA (URINE) with reflex to Scope    Lipid panel    Swelling of joint of hand, right        Relevant Orders    RADHA Screen w/ Reflex to Titer/Pattern    RF Screen w/ Reflex to Titer    Right arm pain        Chronic right shoulder pain        Radiating pain                Subjective:      Patient ID: Obed Larry is a 26 y o  male  Patient presents in office to establish care  His previous provider has left her area  Today's complaint is a right hand and wrist pain she few months now   States that he is right-handed and knee does most of the  work with his right hand  He uses drill guns on daily basis and now is having weakness but that hand and swelling           The following portions of the patient's history were reviewed and updated as appropriate:   Past Medical History:  He has a past medical history of Back pain and Testicle pain  ,  _______________________________________________________________________  Medical Problems:  does not have any pertinent problems on file ,  _______________________________________________________________________  Past Surgical History:   has no past surgical history on file ,  _______________________________________________________________________  Family History:  family history includes Cancer in his father and mother; Diabetes in his mother; Stroke in his father ,  _______________________________________________________________________  Social History:   reports that he has never smoked  He has never used smokeless tobacco  He reports current alcohol use  He reports that he does not use drugs  ,  _______________________________________________________________________  Allergies:  is allergic to shellfish allergy and other     _______________________________________________________________________  Current Outpatient Medications   Medication Sig Dispense Refill    acetaminophen (TYLENOL) 325 mg tablet Take 3 tablets (975 mg total) by mouth 3 (three) times a day 30 tablet 0    meloxicam (MOBIC) 15 mg tablet Take 1 tablet (15 mg total) by mouth daily 30 tablet 1    Sulfamethoxazole-Trimethoprim (BACTRIM PO) Take by mouth       No current facility-administered medications for this visit       _______________________________________________________________________  Review of Systems   Constitutional: Positive for unexpected weight change (positive for weight gain )  Negative for chills and fatigue  HENT: Negative for congestion, sinus pressure, sinus pain and sore throat  Eyes: Negative  Respiratory: Negative for cough and shortness of breath  Cardiovascular: Positive for leg swelling  Negative for chest pain and palpitations  Gastrointestinal: Negative for abdominal pain and nausea  Endocrine:        Obesity    Genitourinary: Negative for difficulty urinating and flank pain  Musculoskeletal: Positive for arthralgias and myalgias  Right hand and wrist pain and swelling of the joints for few months now   Increased weakness when squeezing things and weakness    Neurological: Negative for headaches  Psychiatric/Behavioral: Negative for sleep disturbance and suicidal ideas  The patient is not nervous/anxious            Objective:  Vitals:    02/15/21 1134   BP: 120/66   BP Location: Left arm   Patient Position: Sitting   Cuff Size: Large   Pulse: 63 Resp: 16   Temp: (!) 97 °F (36 1 °C)   TempSrc: Temporal   SpO2: 98%   Weight: (!) 179 kg (393 lb 12 8 oz)   Height: 6' 1" (1 854 m)     Body mass index is 51 96 kg/m²  Physical Exam  Vitals signs and nursing note reviewed  Constitutional:       Appearance: He is obese  Comments: BMI 51 96   HENT:      Right Ear: Tympanic membrane normal       Left Ear: Tympanic membrane normal    Eyes:      Extraocular Movements: Extraocular movements intact  Cardiovascular:      Rate and Rhythm: Normal rate and regular rhythm  Pulmonary:      Effort: Pulmonary effort is normal       Breath sounds: Normal breath sounds  Abdominal:      Palpations: Abdomen is soft  Musculoskeletal: Normal range of motion  General: Swelling and tenderness present  Comments: Weakness to right hand and fingers / swelling noted and pain   Neurological:      Mental Status: He is alert and oriented to person, place, and time     Psychiatric:         Mood and Affect: Mood normal          Behavior: Behavior normal

## 2021-02-16 NOTE — PATIENT INSTRUCTIONS
Arthralgia   WHAT YOU NEED TO KNOW:   Arthralgia is pain in one or more joints, with no inflammation  It may be short-term and get better within 6 to 8 weeks  Arthralgia can be an early sign of arthritis  Arthralgia may be caused by a medical condition, such as a hormone disorder or a tumor  It may also be caused by an infection or injury  DISCHARGE INSTRUCTIONS:   Medicines: The following medicines may  be ordered for you:  · Acetaminophen  decreases pain  Ask how much to take and how often to take it  Follow directions  Acetaminophen can cause liver damage if not taken correctly  · NSAIDs  decrease pain and prevent swelling  Ask your healthcare provider which medicine is right for you  Ask how much to take and when to take it  Take as directed  NSAIDs can cause stomach bleeding and kidney problems if not taken correctly  · Pain relief cream  decreases pain  Use this cream as directed  · Take your medicine as directed  Contact your healthcare provider if you think your medicine is not helping or if you have side effects  Tell him of her if you are allergic to any medicine  Keep a list of the medicines, vitamins, and herbs you take  Include the amounts, and when and why you take them  Bring the list or the pill bottles to follow-up visits  Carry your medicine list with you in case of an emergency  Follow up with your healthcare provider or specialist as directed:  Write down your questions so you remember to ask them during your visits  Self-care:   · Apply heat  to help decrease pain  Use a heating pad or heat wrap  Apply heat for 20 to 30 minutes every 2 hours for as many days as directed  · Rest  as much as possible  Avoid activities that cause joint pain  · Apply ice  to help decrease swelling and pain  Ice may also help prevent tissue damage  Use an ice pack, or put crushed ice in a plastic bag   Cover it with a towel and place it on your painful joint for 15 to 20 minutes every hour or as directed  · Support  the joint with a brace or elastic wrap as directed  · Elevate  your joint above the level of your heart as often as you can to help decrease swelling and pain  Prop your painful joint on pillows or blankets to keep it elevated comfortably  · Lose weight  if you are overweight  Extra weight can put pressure on your joints and cause more pain  Ask your healthcare provider how much you should weigh  Ask him to help you create a weight loss plan  · Exercise  regularly to help improve joint movement and to decrease pain  Ask about the best exercise plan for you  Low-impact exercises can help take the pressure off your joints  Examples are walking, swimming, and water aerobics  Physical therapy:  A physical therapist teaches you exercises to help improve movement and strength, and to decrease pain  Ask your healthcare provider if physical therapy is right for you  Contact your healthcare provider or specialist if:   · You have a fever  · You continue to have joint pain that cannot be relieved with heat, ice, or medicine  · You have pain and inflammation around your joint  · You have questions or concerns about your condition or care  Return to the emergency department if:   · You have sudden, severe pain when you move your joint  · You have a fever and shaking chills  · You cannot move your joint  · You lose feeling on the side of your body where you have the painful joint  © Copyright [address] Information is for End User's use only and may not be sold, redistributed or otherwise used for commercial purposes  All illustrations and images included in CareNotes® are the copyrighted property of A D A M , Inc  or [facility] Oswald Cai   The above information is an  only  It is not intended as medical advice for individual conditions or treatments   Talk to your doctor, nurse or pharmacist before following any medical regimen to see if it is safe and effective for you

## 2021-02-24 ENCOUNTER — PATIENT MESSAGE (OUTPATIENT)
Dept: FAMILY MEDICINE CLINIC | Facility: CLINIC | Age: 26
End: 2021-02-24

## 2021-02-24 DIAGNOSIS — M79.641 HAND PAIN, RIGHT: ICD-10-CM

## 2021-02-24 DIAGNOSIS — M79.601 ARM PAIN, ANTERIOR, RIGHT: ICD-10-CM

## 2021-02-24 DIAGNOSIS — M25.511 CHRONIC RIGHT SHOULDER PAIN: Primary | ICD-10-CM

## 2021-02-24 DIAGNOSIS — G89.29 CHRONIC RIGHT SHOULDER PAIN: Primary | ICD-10-CM

## 2021-02-25 ENCOUNTER — TELEPHONE (OUTPATIENT)
Dept: FAMILY MEDICINE CLINIC | Facility: CLINIC | Age: 26
End: 2021-02-25

## 2021-02-25 NOTE — TELEPHONE ENCOUNTER
You ordered a Right Shoulder MRI - I do not see any notes in chart to back this up  Please advise   Pt is scheduled for 3/7/21

## 2021-02-27 ENCOUNTER — TRANSCRIBE ORDERS (OUTPATIENT)
Dept: RADIOLOGY | Facility: HOSPITAL | Age: 26
End: 2021-02-27

## 2021-02-27 ENCOUNTER — HOSPITAL ENCOUNTER (OUTPATIENT)
Dept: RADIOLOGY | Facility: HOSPITAL | Age: 26
Discharge: HOME | End: 2021-02-27
Payer: COMMERCIAL

## 2021-02-27 DIAGNOSIS — M79.5 FOREIGN BODY (FB) IN SOFT TISSUE: ICD-10-CM

## 2021-02-27 DIAGNOSIS — M79.5 FOREIGN BODY (FB) IN SOFT TISSUE: Primary | ICD-10-CM

## 2021-02-27 PROCEDURE — 70030 X-RAY EYE FOR FOREIGN BODY: CPT

## 2021-02-28 ENCOUNTER — HOSPITAL ENCOUNTER (OUTPATIENT)
Dept: RADIOLOGY | Facility: HOSPITAL | Age: 26
Discharge: HOME | End: 2021-02-28
Payer: COMMERCIAL

## 2021-02-28 DIAGNOSIS — M25.59 PAIN IN OTHER JOINT: ICD-10-CM

## 2021-02-28 DIAGNOSIS — M79.641 RIGHT HAND PAIN: ICD-10-CM

## 2021-02-28 PROCEDURE — G1004 CDSM NDSC: HCPCS

## 2021-02-28 PROCEDURE — 73218 MRI UPPER EXTREMITY W/O DYE: CPT

## 2021-03-01 NOTE — TELEPHONE ENCOUNTER
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

## 2021-03-01 NOTE — TELEPHONE ENCOUNTER
Eye x ray was done   He should be good to go to schedule MRI   Left a message for patient to see if he needs anything else

## 2021-03-02 NOTE — TELEPHONE ENCOUNTER
JUSTIN is looking for Xray of shoulder - was not done  I spoke to [facility] and the [place], she said at this point he needs to go to Ortho  I called pt MARICRUZ on his VM

## 2021-03-04 ENCOUNTER — TELEPHONE (OUTPATIENT)
Dept: FAMILY MEDICINE CLINIC | Facility: CLINIC | Age: 26
End: 2021-03-04

## 2021-03-05 NOTE — TELEPHONE ENCOUNTER
I discussed that in office that I will try to order however due to lack of medical history and documentation if denies he may have to see [facility]

## 2021-03-07 ENCOUNTER — DOCUMENTATION (OUTPATIENT)
Dept: FAMILY MEDICINE CLINIC | Facility: CLINIC | Age: 26
End: 2021-03-07

## 2021-03-07 NOTE — PROGRESS NOTES
Dr Fernando Pierre from Radiology called regarding hand imaging suggests inflammatory arthritis and a cyst which he recommended Ct scan for, imaging with no acute findings- will forward to PCP

## 2021-03-10 ENCOUNTER — TELEPHONE (OUTPATIENT)
Dept: FAMILY MEDICINE CLINIC | Facility: CLINIC | Age: 26
End: 2021-03-10

## 2021-03-10 NOTE — TELEPHONE ENCOUNTER
----- Message from Gertrude Lamas sent at 3/8/2021  5:04 PM EST -----  There are hand abnormalities on the MRI of the hand   Please have him see ORTHO please if he has not seen them already   Lots of swelling and inflammation   IMPRESSION:     There is mild bone marrow edema in the shaft of the 3rd metacarpal no demonstratable discrete line  This may be on inflammatory or traumatic basis  Correlate clinically     Small well-circumscribed T2 hyperintensity with corticated margins measuring about 2 mm at the proximal base of the 3rd metacarpal may be small degenerative cyst or inflammatory erosion    Consider CT for further evaluation

## 2021-05-29 ENCOUNTER — IMMUNIZATIONS (OUTPATIENT)
Dept: FAMILY MEDICINE CLINIC | Facility: HOSPITAL | Age: 26
End: 2021-05-29

## 2021-05-29 ENCOUNTER — APPOINTMENT (OUTPATIENT)
Dept: LAB | Facility: HOSPITAL | Age: 26
End: 2021-05-29
Payer: COMMERCIAL

## 2021-05-29 ENCOUNTER — TRANSCRIBE ORDERS (OUTPATIENT)
Dept: LAB | Facility: HOSPITAL | Age: 26
End: 2021-05-29

## 2021-05-29 DIAGNOSIS — Z00.8 HEALTH EXAMINATION IN POPULATION SURVEY: ICD-10-CM

## 2021-05-29 DIAGNOSIS — Z23 ENCOUNTER FOR IMMUNIZATION: Primary | ICD-10-CM

## 2021-05-29 DIAGNOSIS — Z00.8 HEALTH EXAMINATION IN POPULATION SURVEY: Primary | ICD-10-CM

## 2021-05-29 LAB
CHOLEST SERPL-MCNC: 168 MG/DL (ref 50–200)
EST. AVERAGE GLUCOSE BLD GHB EST-MCNC: 114 MG/DL
HBA1C MFR BLD: 5.6 %
HDLC SERPL-MCNC: 35 MG/DL
LDLC SERPL CALC-MCNC: 113 MG/DL (ref 0–100)
NONHDLC SERPL-MCNC: 133 MG/DL
TRIGL SERPL-MCNC: 102 MG/DL

## 2021-05-29 PROCEDURE — 91300 SARS-COV-2 / COVID-19 MRNA VACCINE (PFIZER-BIONTECH) 30 MCG: CPT

## 2021-05-29 PROCEDURE — 83036 HEMOGLOBIN GLYCOSYLATED A1C: CPT

## 2021-05-29 PROCEDURE — 36415 COLL VENOUS BLD VENIPUNCTURE: CPT

## 2021-05-29 PROCEDURE — 0001A SARS-COV-2 / COVID-19 MRNA VACCINE (PFIZER-BIONTECH) 30 MCG: CPT

## 2021-05-29 PROCEDURE — 80061 LIPID PANEL: CPT

## 2021-06-19 ENCOUNTER — IMMUNIZATIONS (OUTPATIENT)
Dept: FAMILY MEDICINE CLINIC | Facility: HOSPITAL | Age: 26
End: 2021-06-19

## 2021-06-19 DIAGNOSIS — Z23 ENCOUNTER FOR IMMUNIZATION: Primary | ICD-10-CM

## 2021-06-19 PROCEDURE — 0002A SARS-COV-2 / COVID-19 MRNA VACCINE (PFIZER-BIONTECH) 30 MCG: CPT

## 2021-06-19 PROCEDURE — 91300 SARS-COV-2 / COVID-19 MRNA VACCINE (PFIZER-BIONTECH) 30 MCG: CPT

## 2021-06-29 ENCOUNTER — HOSPITAL ENCOUNTER (EMERGENCY)
Facility: HOSPITAL | Age: 26
Discharge: HOME | End: 2021-06-30
Attending: EMERGENCY MEDICINE | Admitting: EMERGENCY MEDICINE
Payer: COMMERCIAL

## 2021-06-29 ENCOUNTER — APPOINTMENT (EMERGENCY)
Dept: RADIOLOGY | Facility: HOSPITAL | Age: 26
End: 2021-06-29
Payer: COMMERCIAL

## 2021-06-29 VITALS
TEMPERATURE: 98.3 F | SYSTOLIC BLOOD PRESSURE: 134 MMHG | HEART RATE: 88 BPM | HEIGHT: 73 IN | OXYGEN SATURATION: 97 % | WEIGHT: 315 LBS | BODY MASS INDEX: 41.75 KG/M2 | RESPIRATION RATE: 20 BRPM | DIASTOLIC BLOOD PRESSURE: 63 MMHG

## 2021-06-29 DIAGNOSIS — R10.9 FLANK PAIN: ICD-10-CM

## 2021-06-29 DIAGNOSIS — T67.9XXA HEAT EXPOSURE: ICD-10-CM

## 2021-06-29 DIAGNOSIS — E86.0 DEHYDRATION: Primary | ICD-10-CM

## 2021-06-29 LAB
ALBUMIN SERPL BCP-MCNC: 4 G/DL (ref 3.5–5)
ALP SERPL-CCNC: 79 U/L (ref 46–116)
ALT SERPL W P-5'-P-CCNC: 71 U/L (ref 12–78)
ANION GAP SERPL CALCULATED.3IONS-SCNC: 6 MMOL/L (ref 4–13)
AST SERPL W P-5'-P-CCNC: 33 U/L (ref 5–45)
BASOPHILS # BLD AUTO: 0.05 THOUSANDS/ΜL (ref 0–0.1)
BASOPHILS NFR BLD AUTO: 0 % (ref 0–1)
BILIRUB DIRECT SERPL-MCNC: 0.21 MG/DL (ref 0–0.2)
BILIRUB SERPL-MCNC: 0.8 MG/DL (ref 0.2–1)
BUN SERPL-MCNC: 18 MG/DL (ref 5–25)
CALCIUM SERPL-MCNC: 9.4 MG/DL (ref 8.3–10.1)
CHLORIDE SERPL-SCNC: 107 MMOL/L (ref 100–108)
CK MB SERPL-MCNC: 3.5 NG/ML (ref 0–5)
CK MB SERPL-MCNC: <1 % (ref 0–2.5)
CK SERPL-CCNC: 428 U/L (ref 39–308)
CO2 SERPL-SCNC: 24 MMOL/L (ref 21–32)
CREAT SERPL-MCNC: 0.85 MG/DL (ref 0.6–1.3)
EOSINOPHIL # BLD AUTO: 0.19 THOUSAND/ΜL (ref 0–0.61)
EOSINOPHIL NFR BLD AUTO: 2 % (ref 0–6)
ERYTHROCYTE [DISTWIDTH] IN BLOOD BY AUTOMATED COUNT: 13.4 % (ref 11.6–15.1)
GFR SERPL CREATININE-BSD FRML MDRD: 122 ML/MIN/1.73SQ M
GLUCOSE SERPL-MCNC: 87 MG/DL (ref 65–140)
HCT VFR BLD AUTO: 44.3 % (ref 36.5–49.3)
HGB BLD-MCNC: 14.4 G/DL (ref 12–17)
IMM GRANULOCYTES # BLD AUTO: 0.04 THOUSAND/UL (ref 0–0.2)
IMM GRANULOCYTES NFR BLD AUTO: 0 % (ref 0–2)
LYMPHOCYTES # BLD AUTO: 3.26 THOUSANDS/ΜL (ref 0.6–4.47)
LYMPHOCYTES NFR BLD AUTO: 27 % (ref 14–44)
MCH RBC QN AUTO: 29.5 PG (ref 26.8–34.3)
MCHC RBC AUTO-ENTMCNC: 32.5 G/DL (ref 31.4–37.4)
MCV RBC AUTO: 91 FL (ref 82–98)
MONOCYTES # BLD AUTO: 0.78 THOUSAND/ΜL (ref 0.17–1.22)
MONOCYTES NFR BLD AUTO: 7 % (ref 4–12)
NEUTROPHILS # BLD AUTO: 7.66 THOUSANDS/ΜL (ref 1.85–7.62)
NEUTS SEG NFR BLD AUTO: 64 % (ref 43–75)
NRBC BLD AUTO-RTO: 0 /100 WBCS
PLATELET # BLD AUTO: 263 THOUSANDS/UL (ref 149–390)
PMV BLD AUTO: 9.8 FL (ref 8.9–12.7)
POTASSIUM SERPL-SCNC: 3.9 MMOL/L (ref 3.5–5.3)
PROT SERPL-MCNC: 8.5 G/DL (ref 6.4–8.2)
RBC # BLD AUTO: 4.88 MILLION/UL (ref 3.88–5.62)
SODIUM SERPL-SCNC: 137 MMOL/L (ref 136–145)
TROPONIN I SERPL-MCNC: <0.02 NG/ML
WBC # BLD AUTO: 11.98 THOUSAND/UL (ref 4.31–10.16)

## 2021-06-29 PROCEDURE — 85025 COMPLETE CBC W/AUTO DIFF WBC: CPT | Performed by: INTERNAL MEDICINE

## 2021-06-29 PROCEDURE — 84484 ASSAY OF TROPONIN QUANT: CPT | Performed by: EMERGENCY MEDICINE

## 2021-06-29 PROCEDURE — 99284 EMERGENCY DEPT VISIT MOD MDM: CPT | Performed by: EMERGENCY MEDICINE

## 2021-06-29 PROCEDURE — 82550 ASSAY OF CK (CPK): CPT | Performed by: INTERNAL MEDICINE

## 2021-06-29 PROCEDURE — 74177 CT ABD & PELVIS W/CONTRAST: CPT

## 2021-06-29 PROCEDURE — 93005 ELECTROCARDIOGRAM TRACING: CPT

## 2021-06-29 PROCEDURE — G1004 CDSM NDSC: HCPCS

## 2021-06-29 PROCEDURE — 82553 CREATINE MB FRACTION: CPT | Performed by: INTERNAL MEDICINE

## 2021-06-29 PROCEDURE — 80076 HEPATIC FUNCTION PANEL: CPT | Performed by: EMERGENCY MEDICINE

## 2021-06-29 PROCEDURE — 96365 THER/PROPH/DIAG IV INF INIT: CPT

## 2021-06-29 PROCEDURE — 80048 BASIC METABOLIC PNL TOTAL CA: CPT | Performed by: INTERNAL MEDICINE

## 2021-06-29 PROCEDURE — 36415 COLL VENOUS BLD VENIPUNCTURE: CPT | Performed by: INTERNAL MEDICINE

## 2021-06-29 PROCEDURE — 71046 X-RAY EXAM CHEST 2 VIEWS: CPT

## 2021-06-29 PROCEDURE — 99284 EMERGENCY DEPT VISIT MOD MDM: CPT

## 2021-06-29 RX ORDER — ONDANSETRON 4 MG/1
4 TABLET, ORALLY DISINTEGRATING ORAL ONCE
Status: COMPLETED | OUTPATIENT
Start: 2021-06-29 | End: 2021-06-29

## 2021-06-29 RX ORDER — SODIUM CHLORIDE, SODIUM GLUCONATE, SODIUM ACETATE, POTASSIUM CHLORIDE, MAGNESIUM CHLORIDE, SODIUM PHOSPHATE, DIBASIC, AND POTASSIUM PHOSPHATE .53; .5; .37; .037; .03; .012; .00082 G/100ML; G/100ML; G/100ML; G/100ML; G/100ML; G/100ML; G/100ML
1000 INJECTION, SOLUTION INTRAVENOUS ONCE
Status: COMPLETED | OUTPATIENT
Start: 2021-06-29 | End: 2021-06-30

## 2021-06-29 RX ADMIN — ONDANSETRON 4 MG: 4 TABLET, ORALLY DISINTEGRATING ORAL at 22:18

## 2021-06-29 RX ADMIN — SODIUM CHLORIDE, SODIUM GLUCONATE, SODIUM ACETATE, POTASSIUM CHLORIDE, MAGNESIUM CHLORIDE, SODIUM PHOSPHATE, DIBASIC, AND POTASSIUM PHOSPHATE 1000 ML: .53; .5; .37; .037; .03; .012; .00082 INJECTION, SOLUTION INTRAVENOUS at 23:50

## 2021-06-29 RX ADMIN — SODIUM CHLORIDE 1000 ML: 0.9 INJECTION, SOLUTION INTRAVENOUS at 22:18

## 2021-06-29 RX ADMIN — IOHEXOL 100 ML: 350 INJECTION, SOLUTION INTRAVENOUS at 23:36

## 2021-06-29 NOTE — Clinical Note
Kinga Mortensen was seen and treated in our emergency department on 6/29/2021  Diagnosis:     Crissy Aburto  may return to school on return date  He may return on this date: 07/02/2021         If you have any questions or concerns, please don't hesitate to call        Kaykay Ogden MD    ______________________________           _______________          _______________  [facility] [place] [facility] Representative                              Date                                Time

## 2021-06-29 NOTE — Clinical Note
Edi Quintana was seen and treated in our emergency department on 6/29/2021  Diagnosis:     Latanya Beckham  may return to work on return date  He may return on this date: 07/02/2021         If you have any questions or concerns, please don't hesitate to call        Ahsan Ledezma RN    ______________________________           _______________          _______________  Hospital Representative                              Date                                Time

## 2021-06-29 NOTE — Clinical Note
Marbella Tobar was seen and treated in our emergency department on 6/29/2021  Diagnosis:     Boaz Christian  may return to school on return date  He may return on this date: 07/02/2021         If you have any questions or concerns, please don't hesitate to call        Jacque Villalpando MD    ______________________________           _______________          _______________  [facility] Representative                              Date                                Time

## 2021-06-29 NOTE — Clinical Note
Marbella Tobar was seen and treated in our emergency department on 6/29/2021  Diagnosis:     Boaz Christian  may return to work on return date  He may return on this date: 07/01/2021         If you have any questions or concerns, please don't hesitate to call        Tevin Griffiths RN    ______________________________           _______________          _______________  Hospital Representative                              Date                                Time

## 2021-06-29 NOTE — Clinical Note
Alia Moreno was seen and treated in our emergency department on 6/29/2021  Diagnosis:     Aubrey Loomis  may return to school on return date  He may return on this date: 07/02/2021         If you have any questions or concerns, please don't hesitate to call        Germain Mayer MD    ______________________________           _______________          _______________  [facility] [place] [facility] Representative                              Date                                Time

## 2021-06-30 ENCOUNTER — TELEPHONE (OUTPATIENT)
Dept: FAMILY MEDICINE CLINIC | Facility: CLINIC | Age: 26
End: 2021-06-30

## 2021-06-30 LAB
ATRIAL RATE: 93 BPM
BILIRUB UR QL STRIP: NEGATIVE
CLARITY UR: CLEAR
COLOR UR: YELLOW
GLUCOSE UR STRIP-MCNC: NEGATIVE MG/DL
HGB UR QL STRIP.AUTO: NEGATIVE
KETONES UR STRIP-MCNC: NEGATIVE MG/DL
LEUKOCYTE ESTERASE UR QL STRIP: NEGATIVE
NITRITE UR QL STRIP: NEGATIVE
P AXIS: 32 DEGREES
PH UR STRIP.AUTO: 6 [PH] (ref 4.5–8)
PR INTERVAL: 164 MS
PROT UR STRIP-MCNC: NEGATIVE MG/DL
QRS AXIS: 99 DEGREES
QRSD INTERVAL: 100 MS
QT INTERVAL: 360 MS
QTC INTERVAL: 447 MS
SP GR UR STRIP.AUTO: 1.01 (ref 1–1.03)
T WAVE AXIS: 26 DEGREES
UROBILINOGEN UR QL STRIP.AUTO: 0.2 E.U./DL
VENTRICULAR RATE: 93 BPM

## 2021-06-30 PROCEDURE — 93010 ELECTROCARDIOGRAM REPORT: CPT | Performed by: INTERNAL MEDICINE

## 2021-06-30 PROCEDURE — 96366 THER/PROPH/DIAG IV INF ADDON: CPT

## 2021-06-30 PROCEDURE — 81003 URINALYSIS AUTO W/O SCOPE: CPT

## 2021-06-30 NOTE — DISCHARGE INSTRUCTIONS
You were seen in the ER today for dehydration, heat exposure and flank pain  Your labs were normal   Your CT abdomen pelvis was normal   Should continue to hydrate by mouth as home

## 2021-06-30 NOTE — ED PROVIDER NOTES
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 Taking? Sulfamethoxazole-Trimethoprim (BACTRIM PO)   Yes No   Sig: Take by mouth   acetaminophen (TYLENOL) 325 mg tablet   No No   Sig: Take 3 tablets (975 mg total) by mouth 3 (three) times a day   meloxicam (MOBIC) 15 mg tablet   No No   Sig: Take 1 tablet (15 mg total) by mouth daily      Facility-Administered Medications: None       Past Medical History:   Diagnosis Date    Back pain     Testicle pain        History reviewed  No pertinent surgical history  Family History   Problem Relation Age of Onset    Stroke Father     Cancer Father     Diabetes Mother     Cancer Mother      I have reviewed and agree with the history as documented      E-Cigarette/Vaping    E-Cigarette Use Never User      E-Cigarette/Vaping Substances     Social History     Tobacco Use    Smoking status: Never Smoker    Smokeless tobacco: Never Used   Vaping Use    Vaping Use: Never used   Substance Use Topics    Alcohol use: Yes     Comment: socially    Drug use: No        Review of Systems   All other systems reviewed and are negative  Physical Exam  ED Triage Vitals [06/29/21 1944]   Temperature Pulse Respirations Blood Pressure SpO2   98 3 °F (36 8 °C) (!) 117 22 168/99 96 %      Temp Source Heart Rate Source Patient Position - Orthostatic VS BP Location FiO2 (%)   Oral Monitor Sitting Right arm --      Pain Score       --             Orthostatic Vital Signs  Vitals:    06/29/21 1944 06/29/21 2230   BP: 168/99 134/63   Pulse: (!) 117 88   Patient Position - Orthostatic VS: Sitting        Physical Exam  PHYSICAL EXAM  Constitutional:  Well developed, well nourished, no acute distress, non-toxic appearance    HEENT:  Conjunctiva normal  Oropharynx moist  Respiratory:  No respiratory distress, normal breath sounds  Cardiovascular:  Normal rate, normal rhythm, no murmurs  GI:  Soft, nondistended, normal bowel sounds, nontender  :  + mild bilateral costovertebral angle tenderness   Musculoskeletal:  No edema, no tenderness, no deformities     Integument:  Well hydrated, no rash   Lymphatic:  No lymphadenopathy noted   Neurologic:  Alert & oriented, normal motor function, normal sensory function, no focal deficits noted   Psychiatric:  Speech and behavior appropriate     ED Medications  Medications   ondansetron (ZOFRAN-ODT) dispersible tablet 4 mg (4 mg Oral Given 6/29/21 2218)   sodium chloride 0 9 % bolus 1,000 mL (0 mL Intravenous Stopped 6/29/21 2248)   multi-electrolyte (ISOLYTE-S PH 7 4) bolus 1,000 mL (1,000 mL Intravenous New Bag 6/29/21 2350)   iohexol (OMNIPAQUE) 350 MG/ML injection (MULTI-DOSE) 100 mL (100 mL Intravenous Given 6/29/21 2336)       Diagnostic Studies  Results Reviewed     Procedure Component Value Units Date/Time    Urine Macroscopic, POC [[id number]] Collected: 06/30/21 0045    Lab Status: Final result Specimen: Urine Updated: 06/30/21 0046     Color, UA Yellow     Clarity, UA Clear     pH, UA 6 0     Leukocytes, UA Negative     Nitrite, UA Negative     Protein, UA Negative mg/dl Glucose, UA Negative mg/dl      Ketones, UA Negative mg/dl      Urobilinogen, UA 0 2 E U /dl      Bilirubin, UA Negative     Blood, UA Negative     Specific Gravity, UA 1 010    Narrative:      CLINITEK RESULT    CKMB [[id number]]  (Normal) Collected: 06/29/21 2218    Lab Status: Final result Specimen: Blood from Arm, Left Updated: 06/29/21 2349     CK-MB Index <1 0 %      CK-MB 3 5 ng/mL     CK (with reflex to MB) [[id number]]  (Abnormal) Collected: 06/29/21 2218    Lab Status: Final result Specimen: Blood from Arm, Left Updated: 06/29/21 2313     Total  U/L     Troponin I [[id number]]  (Normal) Collected: 06/29/21 2218    Lab Status: Final result Specimen: Blood from Arm, Left Updated: 06/29/21 2252     Troponin I <0 02 ng/mL     Hepatic function panel [[id number]]  (Abnormal) Collected: 06/29/21 2218    Lab Status: Final result Specimen: Blood from Arm, Left Updated: 06/29/21 2252     Total Bilirubin 0 80 mg/dL      Bilirubin, Direct 0 21 mg/dL      Alkaline Phosphatase 79 U/L      AST 33 U/L      ALT 71 U/L      Total Protein 8 5 g/dL      Albumin 4 0 g/dL     Basic metabolic panel [[id number]] Collected: 06/29/21 2218    Lab Status: Final result Specimen: Blood from Arm, Left Updated: 06/29/21 2252     Sodium 137 mmol/L      Potassium 3 9 mmol/L      Chloride 107 mmol/L      CO2 24 mmol/L      ANION GAP 6 mmol/L      BUN 18 mg/dL      Creatinine 0 85 mg/dL      Glucose 87 mg/dL      Calcium 9 4 mg/dL      eGFR 122 ml/min/1 73sq m     Narrative:      [place] guidelines for Chronic Kidney Disease (CKD):     Stage 1 with normal or high GFR (GFR > 90 mL/min/1 73 square meters)    Stage 2 Mild CKD (GFR = 60-89 mL/min/1 73 square meters)    Stage 3A Moderate CKD (GFR = 45-59 mL/min/1 73 square meters)    Stage 3B Moderate CKD (GFR = 30-44 mL/min/1 73 square meters)    Stage 4 Severe CKD (GFR = 15-29 mL/min/1 73 square meters)    Stage 5 End Stage CKD (GFR <15 mL/min/1 73 square meters)  Note: GFR calculation is accurate only with a steady state creatinine    CBC and differential [[id number]]  (Abnormal) Collected: 06/29/21 2218    Lab Status: Final result Specimen: Blood from Arm, Left Updated: 06/29/21 2230     WBC 11 98 Thousand/uL      RBC 4 88 Million/uL      Hemoglobin 14 4 g/dL      Hematocrit 44 3 %      MCV 91 fL      MCH 29 5 pg      MCHC 32 5 g/dL      RDW 13 4 %      MPV 9 8 fL      Platelets 246 Thousands/uL      nRBC 0 /100 WBCs      Neutrophils Relative 64 %      Immat GRANS % 0 %      Lymphocytes Relative 27 %      Monocytes Relative 7 %      Eosinophils Relative 2 %      Basophils Relative 0 %      Neutrophils Absolute 7 66 Thousands/µL      Immature Grans Absolute 0 04 Thousand/uL      Lymphocytes Absolute 3 26 Thousands/µL      Monocytes Absolute 0 78 Thousand/µL      Eosinophils Absolute 0 19 Thousand/µL      Basophils Absolute 0 05 Thousands/µL                  CT abdomen pelvis w contrast   Final Result by Jasmin Chowdhury MD (06/30 0000)         1  Hepatic steatosis  2   No bowel obstruction, colitis or diverticulitis  Normal appendix  Workstation performed: [id number]         XR chest 2 views    (Results Pending)         Procedures  Procedures      ED Course  ED Course as of Jun 30 0104   Tue Jun 29, 2021   2320 EKG: normal sinus rhythm, right axis deviation                                             MDM  Number of Diagnoses or Management Options  Dehydration  Flank pain  Heat exposure  Diagnosis management comments: 20-year-old healthy male presents to the ED for evaluation of nausea, emesis and dehydration  Labs unremarkable  CT a/p showed no acute findings  Pt improved after 2 liter of IVF  Results discussed and pt discharged home          Amount and/or Complexity of Data Reviewed  Clinical lab tests: ordered and reviewed  Tests in the radiology section of CPT®: ordered and reviewed  Discussion of test results with the performing providers: yes  Review and summarize past medical records: yes  Discuss the patient with other providers: yes    Risk of Complications, Morbidity, and/or Mortality  Presenting problems: moderate  Diagnostic procedures: moderate  Management options: moderate    Patient Progress  Patient progress: improved      Disposition  Final diagnoses:   Dehydration   Heat exposure   Flank pain     Time reflects when diagnosis was documented in both MDM as applicable and the Disposition within this note     Time User Action Codes Description Comment    6/30/2021 12:33 AM [place] Madai Add [E86 0] Dehydration     6/30/2021 12:33 AM [place] Mad[place] Add Eloisa Alken  9XXA] Heat exposure     6/30/2021 12:34 AM [place] Mad[place] Add [R10 9] Flank pain       ED Disposition     ED Disposition Condition Date/Time Comment    Discharge Good Wed Jun 30, 2021 12:33 AM Patience Resendez discharge to home/self care  Follow-up Information     Follow up With Specialties Details Why Contact Info    RANDY Alcaraz Nurse Practitioner, Family Medicine Call  If symptoms worsen [address] [place]  [phone]            Patient's Medications   Discharge Prescriptions    No medications on file     No discharge procedures on file  PDMP Review     None           ED Provider  Attending physically available and evaluated Patience Resendez I managed the patient along with the ED Attending      Electronically Signed by         Hayden Banegas MD  06/30/21 1176

## 2021-06-30 NOTE — ED ATTENDING ATTESTATION
6/29/2021  I, Sabra Alston MD, saw and evaluated the patient  I have discussed the patient with the resident/non-physician practitioner and agree with the resident's/non-physician practitioner's findings, Plan of Care, and MDM as documented in the resident's/non-physician practitioner's note, except where noted  All available labs and Radiology studies were reviewed  I was present for key portions of any procedure(s) performed by the resident/non-physician practitioner and I was immediately available to provide assistance  At this point I agree with the current assessment done in the Emergency Department  I have conducted an independent evaluation of this patient a history and physical is as follows:    OA: 25 y/o m with no known PMH who presents with nausea, lightheadedness and fatigue with diffuse body 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  Not able to adequately hydrate but has been drinking water  Noted he would sweat profusely upon arrival but soon stopped sweating as the day went on  Became nauseous and lightheaded but did not have syncope  Checked his pulse and it was elevated  Denies any cp/sob/palpitations  + abdominal cramping as well as back/flank cramping today that developed as the day progressed and believs he had decreased urine output despite trying to hydrate  Denies focal numbness/weakness/tingling  Feels improved since leaving the environment but still tired  No recent illnesses  No falls/trauma  Does have a history of back pain/radiculopathy R>L   PE, NAD, VSS/afebrile, feels improved but still tired at this time, NC/AT, mildly dry MM, clear sclera/conjunctiva, neck supple/FROM, RR/- tachycardia at this time, no murmurs, lungs CTAB, abd soft, + mild ttp over R paraspinal lumbar region, no midline ttp, NT/ND, +BS, -r/g, - LE edema, - calf ttp, intact distal pulses and capillary refill < 2 sec, 5/5 strength and intact sensation x 4, PETTIT,  AAO  A/p nausea, fatigue, lightheadedness and myalgias in the setting of hot temperatures  Improved with cooling  Concern for dehydration, heat illness  Eval with labs, EKG, IVF hydration, consider abdominal imaging pending blood work  Re-eval and dispo accordingly  ED Course   Pt workup pending at end of shift  Consideration to abd imaging pending lab results  IVF hdyration  Telemetry monitoring  Re-eval and dispo accordingly  If pt continues to feel unwell or abnl workup, admit  If pt improved with workup WNL, anticipate dc         Critical Care Time  Procedures

## 2021-11-08 ENCOUNTER — APPOINTMENT (EMERGENCY)
Dept: RADIOLOGY | Facility: HOSPITAL | Age: 26
End: 2021-11-08
Payer: COMMERCIAL

## 2021-11-08 ENCOUNTER — HOSPITAL ENCOUNTER (EMERGENCY)
Facility: HOSPITAL | Age: 26
Discharge: HOME | End: 2021-11-08
Attending: EMERGENCY MEDICINE
Payer: COMMERCIAL

## 2021-11-08 VITALS
HEART RATE: 83 BPM | SYSTOLIC BLOOD PRESSURE: 160 MMHG | TEMPERATURE: 98.8 F | OXYGEN SATURATION: 98 % | DIASTOLIC BLOOD PRESSURE: 92 MMHG | RESPIRATION RATE: 16 BRPM

## 2021-11-08 DIAGNOSIS — M79.641 RIGHT HAND PAIN: Primary | ICD-10-CM

## 2021-11-08 PROCEDURE — 99284 EMERGENCY DEPT VISIT MOD MDM: CPT | Performed by: EMERGENCY MEDICINE

## 2021-11-08 PROCEDURE — 99283 EMERGENCY DEPT VISIT LOW MDM: CPT

## 2021-11-08 PROCEDURE — 73130 X-RAY EXAM OF HAND: CPT

## 2021-11-22 ENCOUNTER — OFFICE VISIT (OUTPATIENT)
Dept: OBGYN CLINIC | Facility: CLINIC | Age: 26
End: 2021-11-22
Payer: COMMERCIAL

## 2021-11-22 VITALS
DIASTOLIC BLOOD PRESSURE: 66 MMHG | HEIGHT: 73 IN | WEIGHT: 315 LBS | SYSTOLIC BLOOD PRESSURE: 100 MMHG | BODY MASS INDEX: 41.75 KG/M2 | HEART RATE: 8 BPM

## 2021-11-22 DIAGNOSIS — M79.641 RIGHT HAND PAIN: ICD-10-CM

## 2021-11-22 DIAGNOSIS — M85.641 CYST OF BONE OF RIGHT HAND: Primary | ICD-10-CM

## 2021-11-22 PROCEDURE — 3008F BODY MASS INDEX DOCD: CPT | Performed by: ORTHOPAEDIC SURGERY

## 2021-11-22 PROCEDURE — 1036F TOBACCO NON-USER: CPT | Performed by: ORTHOPAEDIC SURGERY

## 2021-11-22 PROCEDURE — 99203 OFFICE O/P NEW LOW 30 MIN: CPT | Performed by: ORTHOPAEDIC SURGERY

## 2021-12-21 ENCOUNTER — HOSPITAL ENCOUNTER (OUTPATIENT)
Dept: RADIOLOGY | Facility: HOSPITAL | Age: 26
Discharge: HOME | End: 2021-12-21
Attending: ORTHOPAEDIC SURGERY
Payer: COMMERCIAL

## 2021-12-21 DIAGNOSIS — M79.641 RIGHT HAND PAIN: ICD-10-CM

## 2021-12-21 DIAGNOSIS — M85.641 CYST OF BONE OF RIGHT HAND: ICD-10-CM

## 2021-12-21 PROCEDURE — 73200 CT UPPER EXTREMITY W/O DYE: CPT

## 2021-12-21 PROCEDURE — G1004 CDSM NDSC: HCPCS

## 2021-12-27 ENCOUNTER — OFFICE VISIT (OUTPATIENT)
Dept: OBGYN CLINIC | Facility: CLINIC | Age: 26
End: 2021-12-27
Payer: COMMERCIAL

## 2021-12-27 VITALS
WEIGHT: 315 LBS | BODY MASS INDEX: 41.75 KG/M2 | HEART RATE: 85 BPM | SYSTOLIC BLOOD PRESSURE: 126 MMHG | HEIGHT: 73 IN | DIASTOLIC BLOOD PRESSURE: 75 MMHG

## 2021-12-27 DIAGNOSIS — M79.641 RIGHT HAND PAIN: Primary | ICD-10-CM

## 2021-12-27 PROCEDURE — 99213 OFFICE O/P EST LOW 20 MIN: CPT | Performed by: ORTHOPAEDIC SURGERY

## 2021-12-27 PROCEDURE — 3008F BODY MASS INDEX DOCD: CPT | Performed by: ORTHOPAEDIC SURGERY

## 2021-12-27 PROCEDURE — 1036F TOBACCO NON-USER: CPT | Performed by: ORTHOPAEDIC SURGERY

## 2021-12-31 ENCOUNTER — HOSPITAL ENCOUNTER (EMERGENCY)
Facility: HOSPITAL | Age: 26
Discharge: HOME | End: 2021-12-31
Attending: EMERGENCY MEDICINE
Payer: COMMERCIAL

## 2021-12-31 VITALS
TEMPERATURE: 98.6 F | OXYGEN SATURATION: 98 % | SYSTOLIC BLOOD PRESSURE: 142 MMHG | RESPIRATION RATE: 18 BRPM | HEART RATE: 93 BPM | DIASTOLIC BLOOD PRESSURE: 97 MMHG

## 2021-12-31 DIAGNOSIS — M54.9 BACK PAIN: Primary | ICD-10-CM

## 2021-12-31 PROCEDURE — 20552 NJX 1/MLT TRIGGER POINT 1/2: CPT | Performed by: EMERGENCY MEDICINE

## 2021-12-31 PROCEDURE — 99283 EMERGENCY DEPT VISIT LOW MDM: CPT

## 2021-12-31 PROCEDURE — 96372 THER/PROPH/DIAG INJ SC/IM: CPT

## 2021-12-31 PROCEDURE — 99284 EMERGENCY DEPT VISIT MOD MDM: CPT | Performed by: EMERGENCY MEDICINE

## 2021-12-31 RX ORDER — KETOROLAC TROMETHAMINE 30 MG/ML
15 INJECTION, SOLUTION INTRAMUSCULAR; INTRAVENOUS ONCE
Status: COMPLETED | OUTPATIENT
Start: 2021-12-31 | End: 2021-12-31

## 2021-12-31 RX ORDER — DIAZEPAM 5 MG/1
5 TABLET ORAL ONCE
Status: COMPLETED | OUTPATIENT
Start: 2021-12-31 | End: 2021-12-31

## 2021-12-31 RX ORDER — METHOCARBAMOL 500 MG/1
1000 TABLET, FILM COATED ORAL 2 TIMES DAILY
Qty: 28 TABLET | Refills: 0 | Status: SHIPPED | OUTPATIENT
Start: 2021-12-31 | End: 2022-01-07

## 2021-12-31 RX ORDER — BUPIVACAINE HYDROCHLORIDE 5 MG/ML
10 INJECTION, SOLUTION EPIDURAL; INTRACAUDAL ONCE
Status: COMPLETED | OUTPATIENT
Start: 2021-12-31 | End: 2021-12-31

## 2021-12-31 RX ADMIN — DIAZEPAM 5 MG: 5 TABLET ORAL at 21:32

## 2021-12-31 RX ADMIN — KETOROLAC TROMETHAMINE 15 MG: 30 INJECTION, SOLUTION INTRAMUSCULAR at 21:32

## 2021-12-31 RX ADMIN — BUPIVACAINE HYDROCHLORIDE 10 ML: 5 INJECTION, SOLUTION EPIDURAL; INTRACAUDAL at 21:13

## 2021-12-31 NOTE — Clinical Note
Julia Flannery was seen and treated in our emergency department on 12/31/2021  Diagnosis:     Karley Guido  is off the rest of the shift today  He may return on this date:     Light duty as tolerated     If you have any questions or concerns, please don't hesitate to call        Toyin Pablo, DO    ______________________________           _______________          _______________  Hospital Representative                              Date                                Time

## 2022-01-01 NOTE — ED PROVIDER NOTES
Hello, Patient was here to day inquiring about a refill for   Venlafaxine HCl (Capsule SR 24 Hr) EFFEXOR- MG  TAKE 1 CAPSULE BY MOUTH ONCE DAILY. [facility]/pharmacy #[id number]- [address], [phone], History  Chief Complaint   Patient presents with    Back Pain     Pt presents ambulatory c/o 10/10 back pain that radiates to the rt hip  States "I think I slipped a disc again"     43-year-old male history of back pain presenting due to acute exacerbation of back pain  Patient states earlier today he dropped a paper on the ground and accidentally slipped on it and caught himself before falling to the ground  He felt sudden severe right lower back pain  Says pain is worse when moving ambulating and radiates down to his knee times  Denies taking anything for his pain prior to arrival   Says he has had herniated disc at that site and feels like worsening of that pain  Denies any saddle anesthesia, urinary or bowel incontinence or retention  Denies any fever, chills, chest pain or dyspnea  Denies any weakness numbness or tingling in extremities  Prior to Admission Medications   Prescriptions Last Dose Informant Patient Reported? Taking? Sulfamethoxazole-Trimethoprim (BACTRIM PO)   Yes No   Sig: Take by mouth   acetaminophen (TYLENOL) 325 mg tablet   No No   Sig: Take 3 tablets (975 mg total) by mouth 3 (three) times a day   meloxicam (MOBIC) 15 mg tablet   No No   Sig: Take 1 tablet (15 mg total) by mouth daily      Facility-Administered Medications: None       Past Medical History:   Diagnosis Date    Back pain     Testicle pain        History reviewed  No pertinent surgical history  Family History   Problem Relation Age of Onset    Stroke Father     Cancer Father     Diabetes Mother     Cancer Mother      I have reviewed and agree with the history as documented      E-Cigarette/Vaping    E-Cigarette Use Never User      E-Cigarette/Vaping Substances     Social History     Tobacco Use    Smoking status: Never Smoker    Smokeless tobacco: Never Used   Vaping Use    Vaping Use: Never used   Substance Use Topics    Alcohol use: Yes     Comment: socially    Drug use: No        Review of Systems   Constitutional: Negative for fatigue and fever  HENT: Negative for congestion and trouble swallowing  Eyes: Negative for visual disturbance  Respiratory: Negative for cough, chest tightness and shortness of breath  Cardiovascular: Negative for chest pain  Gastrointestinal: Negative for abdominal pain, diarrhea, nausea and vomiting  Genitourinary: Negative for flank pain  Musculoskeletal: Positive for back pain  Negative for gait problem  Skin: Negative for rash and wound  Neurological: Negative for syncope and headaches  Psychiatric/Behavioral: Negative for agitation  All other systems reviewed and are negative  Physical Exam  ED Triage Vitals [12/31/21 1814]   Temperature Pulse Respirations Blood Pressure SpO2   98 6 °F (37 °C) 93 18 142/97 98 %      Temp Source Heart Rate Source Patient Position - Orthostatic VS BP Location FiO2 (%)   Oral Monitor -- -- --      Pain Score       10 - Worst Possible Pain             Orthostatic Vital Signs  Vitals:    12/31/21 1814   BP: 142/97   Pulse: 93       Physical Exam  Vitals and nursing note reviewed  Constitutional:       Appearance: He is well-developed  He is not diaphoretic  HENT:      Head: Normocephalic and atraumatic  Right Ear: External ear normal       Left Ear: External ear normal    Eyes:      General:         Right eye: No discharge  Left eye: No discharge  Conjunctiva/sclera: Conjunctivae normal    Neck:      Vascular: No JVD  Trachea: No tracheal deviation  Cardiovascular:      Rate and Rhythm: Normal rate and regular rhythm  Heart sounds: Normal heart sounds  Pulmonary:      Effort: Pulmonary effort is normal       Breath sounds: Normal breath sounds  No wheezing  Abdominal:      General: There is no distension  Musculoskeletal:         General: Tenderness present  Normal range of motion  Cervical back: Normal range of motion        Comments: Right lumbar paraspinal tenderness Skin:     General: Skin is warm and dry  Neurological:      Mental Status: He is alert and oriented to person, place, and time  Psychiatric:         Mood and Affect: Mood normal          Speech: Speech normal          Behavior: Behavior normal          ED Medications  Medications   bupivacaine (PF) (MARCAINE) 0 5 % injection 10 mL (10 mL Infiltration Given by Other 12/31/21 2113)   diazepam (VALIUM) tablet 5 mg (5 mg Oral Given 12/31/21 2132)   ketorolac (TORADOL) injection 15 mg (15 mg Intramuscular Given 12/31/21 2132)       Diagnostic Studies  Results Reviewed     None                 No orders to display         Procedures  Trigger Injection 1 or 2 muscles    Date/Time: 12/31/2021 10:03 PM  Performed by: Leanne Patel DO  Authorized by: Leanne Patel DO     Patient location:  ED  Consent:     Consent obtained:  Verbal    Consent given by:  Patient  Universal protocol:     Patient identity confirmed:  Verbally with patient and arm band  Location:     Body area:  Lower back    Injection Trigger Points:  1  Procedure details:     Needle gauge:  27 G    Anesthetic injected:  Bupivacaine 0 5% w/o epi    Steroid injected:  None    Additive injected:  None    Injection procedure:  Anatomic landmarks palpated, anatomic landmarks identified, incremental injection, introduced needle and negative aspiration for blood  Post-procedure details:     Dressing:  None    Outcome:  Pain improved    Patient tolerance of procedure: Tolerated well, no immediate complications          ED Course                                       MDM  Number of Diagnoses or Management Options  Back pain  Diagnosis management comments: Provided muscle relaxing, Toradol, trigger point injection  Provided contact information for [facility]  No red flags for indication for imaging at this time    Safe for discharge return precautions advised      Disposition  Final diagnoses:   Back pain     Time reflects when diagnosis was documented in both MDM as applicable and the Disposition within this note     Time User Action Codes Description Comment    12/31/2021  9:14 PM Chey Giraldo Add [M54 9] Back pain       ED Disposition     ED Disposition Condition Date/Time Comment    Discharge Stable Fri Dec 31, 2021  9:34 PM Ingrid Win discharge to home/self care  Follow-up Information     Follow up With Specialties Details Why Contact Info Additional Information    [facility] Physical Therapy Schedule an appointment as soon as possible for a visit   [phone]          Discharge Medication List as of 12/31/2021  9:34 PM      START taking these medications    Details   methocarbamol (ROBAXIN) 500 mg tablet Take 2 tablets (1,000 mg total) by mouth 2 (two) times a day for 7 days, Starting Fri 12/31/2021, Until Fri 1/7/2022, Normal         CONTINUE these medications which have NOT CHANGED    Details   acetaminophen (TYLENOL) 325 mg tablet Take 3 tablets (975 mg total) by mouth 3 (three) times a day, Starting Thu 1/24/2019, No Print      meloxicam (MOBIC) 15 mg tablet Take 1 tablet (15 mg total) by mouth daily, Starting Mon 2/15/2021, Until Wed 3/17/2021, Normal      Sulfamethoxazole-Trimethoprim (BACTRIM PO) Take by mouth, Historical Med           No discharge procedures on file  PDMP Review     None           ED Provider  Attending physically available and evaluated Ingrid Win  RJ managed the patient along with the ED Attending      Electronically Signed by         Arianna Garza DO  12/31/21 7867

## 2022-01-01 NOTE — ED ATTENDING ATTESTATION
12/31/2021  IMary DO, saw and evaluated the patient  I have discussed the patient with the resident/non-physician practitioner and agree with the resident's/non-physician practitioner's findings, Plan of Care, and MDM as documented in the resident's/non-physician practitioner's note, except where noted  All available labs and Radiology studies were reviewed  I was present for key portions of any procedure(s) performed by the resident/non-physician practitioner and I was immediately available to provide assistance  At this point I agree with the current assessment done in the Emergency Department  I have conducted an independent evaluation of this patient a history and physical is as follows:    23 yom with low back pain  Bent over the pick over a napkin, felt a pull in his back  Hx of "slipped disk" feels similar  Denies urinary retention or incontinence, saddle anesthesia, leg weakness, paresthesias  Normal exam other than paravertebral tenderness   Plan supportive care, pain control    ED Course         Critical Care Time  Procedures

## 2022-01-01 NOTE — DISCHARGE INSTRUCTIONS
Please schedule an appointment with the [facility]  Contact information provided  Medication was sent to your pharmacy  Please take as prescribed

## 2022-01-03 ENCOUNTER — TELEPHONE (OUTPATIENT)
Dept: PHYSICAL THERAPY | Facility: OTHER | Age: 27
End: 2022-01-03

## 2022-01-03 ENCOUNTER — NURSE TRIAGE (OUTPATIENT)
Dept: PHYSICAL THERAPY | Facility: OTHER | Age: 27
End: 2022-01-03

## 2022-01-03 DIAGNOSIS — G89.29 ACUTE EXACERBATION OF CHRONIC LOW BACK PAIN: Primary | ICD-10-CM

## 2022-01-03 DIAGNOSIS — M54.50 ACUTE EXACERBATION OF CHRONIC LOW BACK PAIN: Primary | ICD-10-CM

## 2022-01-03 NOTE — TELEPHONE ENCOUNTER
Call placed to the patient per [facility] referral     Voice message left for patient to call back  Phone number and hours of business provided       No referral

## 2022-01-03 NOTE — TELEPHONE ENCOUNTER
Additional Information   Negative: Is this related to a work injury?  Negative: Is this related to an MVA?  Negative: Are you currently recieving homecare services?  Negative: Has the patient had unexplained weight loss?  Negative: Does the patient have a fever?  Negative: Is the patient experiencing blood in sputum?  Negative: Is the patient experiencing urine retention?  Negative: Is the patient experiencing acute drop foot or paralysis?  Negative: Has the patient experienced major trauma? (fall from height, high speed collision, direct blow to spine) and is also experiencing nausea, light-headedness, or loss of consciousness?  Negative: Is this a chronic condition? Background - Initial Assessment  Clinical complaint: right side lower back pain which radiates down the leg to the knee  States he somtimes gets numbness and tingling of the right leg  States this episode of back pain started 12/31/2021  States he has a history of back issues and herniates discs  Date of onset: 12/31/2021  Frequency of pain: constant  Quality of pain: constant ache and sharp and stabbing with movement    Protocols used: [facility] PRO[facility] PROTOCOL    This RN did review in detail the [facility] and what we can provide for their back pain  Patient is agreeable to being triaged by this RN and would like to proceed with Physical Therapy  Referral was placed for Physical Therapy at the [facility]  Patients information was sent to the  to make evaluation appointment  Patient made aware that the PT office  will be calling to schedule the appointment  Patient was provided with the phone number to the PT office  No further questions and/or concerns were voiced by the patient at this time  Patient states understanding of the referral that was placed

## 2022-01-06 ENCOUNTER — EVALUATION (OUTPATIENT)
Dept: PHYSICAL THERAPY | Facility: OTHER | Age: 27
End: 2022-01-06
Payer: COMMERCIAL

## 2022-01-06 DIAGNOSIS — M54.50 ACUTE EXACERBATION OF CHRONIC LOW BACK PAIN: ICD-10-CM

## 2022-01-06 DIAGNOSIS — G89.29 ACUTE EXACERBATION OF CHRONIC LOW BACK PAIN: ICD-10-CM

## 2022-01-06 PROCEDURE — 97110 THERAPEUTIC EXERCISES: CPT | Performed by: PHYSICAL THERAPIST

## 2022-01-06 PROCEDURE — 97140 MANUAL THERAPY 1/> REGIONS: CPT | Performed by: PHYSICAL THERAPIST

## 2022-01-06 NOTE — PROGRESS NOTES
PT Evaluation     Today's date: 2022  Patient name: Jered Jade  : 1995  MRN: [id number]  Referring provider: Maurene Essex, PT  Dx: No diagnosis found  Assessment  Assessment details: Jered Jade is a pleasant 23 y o  male who presents with he reported that he has had back pain in the past MRI 2019 several herniations  He was on bed rest for a month at that time this did not improve his symptoms  aurgery was sugested at this time but he electived for conservitive care  In the past he did have chiropractic care at that time which eventiually helped him  This episode started when he slipped  On a napkin putting pressure n the R LE sharp pain R sided into the hip  He does fee like the hip is giving out 1x a day no saddle anathercia  No N/T into the LE  But he did note that in the 2day  days that he has some N/T into his hands not present today  HEP issued and POC reviewed  Impairments: abnormal coordination, abnormal muscle firing, abnormal or restricted ROM, activity intolerance, impaired physical strength, lacks appropriate home exercise program, pain with function and poor body mechanics    Symptom irritability: moderateUnderstanding of Dx/Px/POC: good   Prognosis: good    Goals  Short Term:  Pt will report decreased levels of pain by at least 2 subjective ratings in 4 weeks  Pt will demonstrate improved ROM by at least 10 degrees in 4 weeks  Pt will demonstrate improved strength by 1/2 grade  Long Term:   Pt will be independent in their HEP in 8 weeks  Pt will be be pain free with IADL's  Pt will demonstrate improved FOTO, > 80         Plan  Plan details: Prognosis above is given PT services 2x/week tapering to 1x/week over the next 3 months and home program adherence    Patient would benefit from: skilled physical therapy  Planned modality interventions: thermotherapy: hydrocollator packs  Planned therapy interventions: activity modification, joint mobilization, manual therapy, motor coordination training, neuromuscular re-education, patient education, self care, therapeutic activities, therapeutic exercise, graded activity and home exercise program  Frequency: 2x week  Treatment plan discussed with: patient        Subjective Evaluation    Pain  At best pain ratin  At worst pain ratin    Patient Goals  Patient goals for therapy: decreased pain, independence with ADLs/IADLs, return to sport/leisure activities, increased motion and increased strength          Objective     General Comments:      Lumbar Comments  Special test                Hip/SI joint:   scour=     -  tata = +     capsular mobility= -          long axis distraction (hip) =    -    SLS= -         obers=-          piriformis test=-    -        P4 test=  +           Gaenslen's test=   +=    Distraction=    +         Active SLR= +         Active SLR with stabilization =+           Leg length =               Sacral Thrust=+   benedict finger= +  S/L si joint compression= +       LUMBAR tests:   SLR =  + slump=+ PA mob=    hypomobiltiy  quadrant test= - traction=  -    prone instability test=   unabke to test             femoral nerve traction=  -   Repetitive testing: extension  = pain     flexion    = pain Lower extremity reflexes: intact   Lower extremity myotomes intact but pain with WB R LE SIJ region Sensation:   Vitals: BP :  HR: 70   O2 sat: 95  5    temperature: 98 8   No history of fracture, surgery, recent illness, osteoporosis       TTP over the SIJ     releif with Gr 5 LAD and SIJ mobilizaiton In L SL              Precautions:       Manuals             LAD SIJ  MJ            L S?L SIJD mobiliasion  MJ            ISTM laser SIJ  MJ                          Neuro Re-Ed                                                                                                        Ther Ex                                                                                                                     Ther Activity                                       Gait Training                                       Modalities

## 2022-01-07 PROCEDURE — 97161 PT EVAL LOW COMPLEX 20 MIN: CPT | Performed by: PHYSICAL THERAPIST

## 2022-01-10 ENCOUNTER — OFFICE VISIT (OUTPATIENT)
Dept: PHYSICAL THERAPY | Facility: OTHER | Age: 27
End: 2022-01-10
Payer: COMMERCIAL

## 2022-01-10 DIAGNOSIS — M54.50 ACUTE EXACERBATION OF CHRONIC LOW BACK PAIN: Primary | ICD-10-CM

## 2022-01-10 DIAGNOSIS — G89.29 ACUTE EXACERBATION OF CHRONIC LOW BACK PAIN: Primary | ICD-10-CM

## 2022-01-10 PROCEDURE — 97140 MANUAL THERAPY 1/> REGIONS: CPT | Performed by: PHYSICAL THERAPIST

## 2022-01-10 PROCEDURE — 97110 THERAPEUTIC EXERCISES: CPT | Performed by: PHYSICAL THERAPIST

## 2022-01-10 PROCEDURE — 97112 NEUROMUSCULAR REEDUCATION: CPT | Performed by: PHYSICAL THERAPIST

## 2022-01-10 NOTE — PROGRESS NOTES
Daily Note     Today's date: 1/10/2022  Patient name: Oc Perez  : 1995  MRN: [id number]  Referring provider: Marilu Cerna PT  Dx:   Encounter Diagnosis     ICD-10-CM    1  Acute exacerbation of chronic low back pain  M54 50     G89 29                   Subjective: walking with less pain  Overall about 20% better  Pain levels still high  Able to WB better through R LE  Still avoiding driving  Objective: See treatment diary below      Assessment: Tolerated treatment well  Patient demonstrated fatigue post treatment      Plan: Continue per plan of care        Precautions:       Manuals  1 10 22           LAD SIJ  MJ MJ prone            L S/L R SIJD mobiliasion  MJ MJ            ISTM laser SIJ  MJ             STM TRP paraspinals   MJ            Neuro Re-Ed             Mini   sqauts 20            Cat camel   20            Alt arm   20 UE            Er into wall at knee   NV                                                   Ther Ex             Table sldie   10''x10            multifitus press along wall   5''x10                                                                                          Ther Activity                                       Gait Training                                       Modalities

## 2022-01-12 ENCOUNTER — OFFICE VISIT (OUTPATIENT)
Dept: PHYSICAL THERAPY | Facility: OTHER | Age: 27
End: 2022-01-12
Payer: COMMERCIAL

## 2022-01-12 DIAGNOSIS — M54.50 ACUTE EXACERBATION OF CHRONIC LOW BACK PAIN: Primary | ICD-10-CM

## 2022-01-12 DIAGNOSIS — G89.29 ACUTE EXACERBATION OF CHRONIC LOW BACK PAIN: Primary | ICD-10-CM

## 2022-01-12 PROCEDURE — 97112 NEUROMUSCULAR REEDUCATION: CPT | Performed by: PHYSICAL THERAPIST

## 2022-01-12 PROCEDURE — 97110 THERAPEUTIC EXERCISES: CPT | Performed by: PHYSICAL THERAPIST

## 2022-01-12 PROCEDURE — 97140 MANUAL THERAPY 1/> REGIONS: CPT | Performed by: PHYSICAL THERAPIST

## 2022-01-12 NOTE — PROGRESS NOTES
Daily Note     Today's date: 2022  Patient name: Joy Rebollar  : 1995  MRN: [id number]  Referring provider: Irene Van, PT  Dx:   Encounter Diagnosis     ICD-10-CM    1  Acute exacerbation of chronic low back pain  M54 50     G89 29                   Subjective:  Some increased pain afterwlaking Gr5 S/L tolerated well  Objective: See treatment diary below      Assessment: Tolerated treatment well  Patient demonstrated fatigue post treatment      Plan: Continue per plan of care        Precautions:     Manuals  1 10 22 1 12 22           LAD SIJ  MJ MJ prone  MJ prone            L S/L R SIJD mobiliasion  MJ MJ  MJ L-S as weel gr5            R hip MWM belt   MJ            ISTM laser SIJ  MJ              STM TRP paraspinals   MJ  MJ            Neuro Re-Ed              Mini   sqauts 20  sqauts 20            Cat camel   20  20            Alt arm   20 UE  20 UE            Er into wall at knee   NV  NV            Nerve glides    10x                                        Ther Ex              Table sldie   10''x10  10''x10            multifitus press along wall   5''x10  5''x10                                                                                                Ther Activity                                          Gait Training                                          Modalities

## 2022-01-17 ENCOUNTER — APPOINTMENT (OUTPATIENT)
Dept: PHYSICAL THERAPY | Facility: OTHER | Age: 27
End: 2022-01-17
Payer: COMMERCIAL

## 2022-01-17 ENCOUNTER — TELEPHONE (OUTPATIENT)
Dept: FAMILY MEDICINE CLINIC | Facility: CLINIC | Age: 27
End: 2022-01-17

## 2022-01-18 ENCOUNTER — APPOINTMENT (OUTPATIENT)
Dept: LAB | Facility: HOSPITAL | Age: 27
End: 2022-01-18
Payer: COMMERCIAL

## 2022-01-18 ENCOUNTER — OFFICE VISIT (OUTPATIENT)
Dept: FAMILY MEDICINE CLINIC | Facility: CLINIC | Age: 27
End: 2022-01-18
Payer: COMMERCIAL

## 2022-01-18 VITALS
BODY MASS INDEX: 41.75 KG/M2 | HEART RATE: 92 BPM | DIASTOLIC BLOOD PRESSURE: 74 MMHG | RESPIRATION RATE: 18 BRPM | SYSTOLIC BLOOD PRESSURE: 124 MMHG | WEIGHT: 315 LBS | TEMPERATURE: 98.5 F | OXYGEN SATURATION: 98 % | HEIGHT: 73 IN

## 2022-01-18 DIAGNOSIS — L72.9 SCROTAL CYST: ICD-10-CM

## 2022-01-18 DIAGNOSIS — R33.9 INCOMPLETE EMPTYING OF BLADDER: ICD-10-CM

## 2022-01-18 DIAGNOSIS — Z11.59 NEED FOR HEPATITIS C SCREENING TEST: ICD-10-CM

## 2022-01-18 DIAGNOSIS — N50.819 PAIN IN TESTICLE, UNSPECIFIED LATERALITY: ICD-10-CM

## 2022-01-18 DIAGNOSIS — M25.59 PAIN IN OTHER JOINT: ICD-10-CM

## 2022-01-18 DIAGNOSIS — R74.8 ELEVATED CK: ICD-10-CM

## 2022-01-18 DIAGNOSIS — R73.03 PREDIABETES: ICD-10-CM

## 2022-01-18 DIAGNOSIS — Z11.4 SCREENING FOR HIV (HUMAN IMMUNODEFICIENCY VIRUS): ICD-10-CM

## 2022-01-18 DIAGNOSIS — G89.29 CHRONIC MIDLINE LOW BACK PAIN WITHOUT SCIATICA: ICD-10-CM

## 2022-01-18 DIAGNOSIS — M54.50 CHRONIC MIDLINE LOW BACK PAIN WITHOUT SCIATICA: ICD-10-CM

## 2022-01-18 DIAGNOSIS — R73.01 ELEVATED FASTING GLUCOSE: ICD-10-CM

## 2022-01-18 DIAGNOSIS — M54.16 ACUTE RIGHT LUMBAR RADICULOPATHY: Primary | ICD-10-CM

## 2022-01-18 DIAGNOSIS — E78.2 MIXED HYPERLIPIDEMIA: ICD-10-CM

## 2022-01-18 DIAGNOSIS — M25.441 SWELLING OF JOINT OF HAND, RIGHT: ICD-10-CM

## 2022-01-18 DIAGNOSIS — M79.641 RIGHT HAND PAIN: ICD-10-CM

## 2022-01-18 DIAGNOSIS — M54.16 ACUTE RIGHT LUMBAR RADICULOPATHY: ICD-10-CM

## 2022-01-18 LAB
ALBUMIN SERPL BCP-MCNC: 4.2 G/DL (ref 3.5–5)
ALP SERPL-CCNC: 85 U/L (ref 46–116)
ALT SERPL W P-5'-P-CCNC: 90 U/L (ref 12–78)
ANION GAP SERPL CALCULATED.3IONS-SCNC: 6 MMOL/L (ref 4–13)
AST SERPL W P-5'-P-CCNC: 35 U/L (ref 5–45)
BASOPHILS # BLD AUTO: 0.04 THOUSANDS/ΜL (ref 0–0.1)
BASOPHILS NFR BLD AUTO: 1 % (ref 0–1)
BILIRUB SERPL-MCNC: 0.29 MG/DL (ref 0.2–1)
BILIRUB UR QL STRIP: NEGATIVE
BUN SERPL-MCNC: 12 MG/DL (ref 5–25)
CALCIUM SERPL-MCNC: 9.2 MG/DL (ref 8.3–10.1)
CHLORIDE SERPL-SCNC: 107 MMOL/L (ref 100–108)
CK MB SERPL-MCNC: 1.1 NG/ML (ref 0–5)
CK MB SERPL-MCNC: <1 % (ref 0–2.5)
CK SERPL-CCNC: 205 U/L (ref 39–308)
CLARITY UR: CLEAR
CO2 SERPL-SCNC: 25 MMOL/L (ref 21–32)
COLOR UR: YELLOW
CREAT SERPL-MCNC: 0.9 MG/DL (ref 0.6–1.3)
EOSINOPHIL # BLD AUTO: 0.14 THOUSAND/ΜL (ref 0–0.61)
EOSINOPHIL NFR BLD AUTO: 2 % (ref 0–6)
ERYTHROCYTE [DISTWIDTH] IN BLOOD BY AUTOMATED COUNT: 13 % (ref 11.6–15.1)
EST. AVERAGE GLUCOSE BLD GHB EST-MCNC: 128 MG/DL
GFR SERPL CREATININE-BSD FRML MDRD: 118 ML/MIN/1.73SQ M
GLUCOSE SERPL-MCNC: 113 MG/DL (ref 65–140)
GLUCOSE UR STRIP-MCNC: NEGATIVE MG/DL
HBA1C MFR BLD: 6.1 %
HCT VFR BLD AUTO: 46.7 % (ref 36.5–49.3)
HCV AB SER QL: NORMAL
HGB BLD-MCNC: 15.3 G/DL (ref 12–17)
HGB UR QL STRIP.AUTO: NEGATIVE
IMM GRANULOCYTES # BLD AUTO: 0.02 THOUSAND/UL (ref 0–0.2)
IMM GRANULOCYTES NFR BLD AUTO: 0 % (ref 0–2)
KETONES UR STRIP-MCNC: ABNORMAL MG/DL
LEUKOCYTE ESTERASE UR QL STRIP: NEGATIVE
LYMPHOCYTES # BLD AUTO: 2.49 THOUSANDS/ΜL (ref 0.6–4.47)
LYMPHOCYTES NFR BLD AUTO: 28 % (ref 14–44)
MCH RBC QN AUTO: 29.6 PG (ref 26.8–34.3)
MCHC RBC AUTO-ENTMCNC: 32.8 G/DL (ref 31.4–37.4)
MCV RBC AUTO: 90 FL (ref 82–98)
MONOCYTES # BLD AUTO: 0.62 THOUSAND/ΜL (ref 0.17–1.22)
MONOCYTES NFR BLD AUTO: 7 % (ref 4–12)
NEUTROPHILS # BLD AUTO: 5.54 THOUSANDS/ΜL (ref 1.85–7.62)
NEUTS SEG NFR BLD AUTO: 62 % (ref 43–75)
NITRITE UR QL STRIP: NEGATIVE
NRBC BLD AUTO-RTO: 0 /100 WBCS
PH UR STRIP.AUTO: 7 [PH]
PLATELET # BLD AUTO: 262 THOUSANDS/UL (ref 149–390)
PMV BLD AUTO: 10 FL (ref 8.9–12.7)
POTASSIUM SERPL-SCNC: 4.1 MMOL/L (ref 3.5–5.3)
PROT SERPL-MCNC: 8.3 G/DL (ref 6.4–8.2)
PROT UR STRIP-MCNC: NEGATIVE MG/DL
RBC # BLD AUTO: 5.17 MILLION/UL (ref 3.88–5.62)
SODIUM SERPL-SCNC: 138 MMOL/L (ref 136–145)
SP GR UR STRIP.AUTO: 1.02 (ref 1–1.03)
TSH SERPL DL<=0.05 MIU/L-ACNC: 2.72 UIU/ML (ref 0.36–3.74)
UROBILINOGEN UR QL STRIP.AUTO: 1 E.U./DL
WBC # BLD AUTO: 8.85 THOUSAND/UL (ref 4.31–10.16)

## 2022-01-18 PROCEDURE — 81003 URINALYSIS AUTO W/O SCOPE: CPT | Performed by: NURSE PRACTITIONER

## 2022-01-18 PROCEDURE — 85025 COMPLETE CBC W/AUTO DIFF WBC: CPT

## 2022-01-18 PROCEDURE — 86430 RHEUMATOID FACTOR TEST QUAL: CPT

## 2022-01-18 PROCEDURE — 80053 COMPREHEN METABOLIC PANEL: CPT

## 2022-01-18 PROCEDURE — 84443 ASSAY THYROID STIM HORMONE: CPT

## 2022-01-18 PROCEDURE — 84153 ASSAY OF PSA TOTAL: CPT

## 2022-01-18 PROCEDURE — 3008F BODY MASS INDEX DOCD: CPT | Performed by: NURSE PRACTITIONER

## 2022-01-18 PROCEDURE — 99214 OFFICE O/P EST MOD 30 MIN: CPT | Performed by: NURSE PRACTITIONER

## 2022-01-18 PROCEDURE — 36415 COLL VENOUS BLD VENIPUNCTURE: CPT

## 2022-01-18 PROCEDURE — 3725F SCREEN DEPRESSION PERFORMED: CPT | Performed by: NURSE PRACTITIONER

## 2022-01-18 PROCEDURE — 86803 HEPATITIS C AB TEST: CPT

## 2022-01-18 PROCEDURE — 87389 HIV-1 AG W/HIV-1&-2 AB AG IA: CPT

## 2022-01-18 PROCEDURE — 86038 ANTINUCLEAR ANTIBODIES: CPT

## 2022-01-18 PROCEDURE — 1036F TOBACCO NON-USER: CPT | Performed by: NURSE PRACTITIONER

## 2022-01-18 PROCEDURE — 83036 HEMOGLOBIN GLYCOSYLATED A1C: CPT

## 2022-01-18 PROCEDURE — 82550 ASSAY OF CK (CPK): CPT

## 2022-01-18 PROCEDURE — 82553 CREATINE MB FRACTION: CPT

## 2022-01-18 NOTE — PATIENT INSTRUCTIONS
Acute Low Back Pain   WHAT YOU NEED TO KNOW:   Acute low back pain is sudden discomfort that lasts up to 6 weeks and makes activity difficult  DISCHARGE INSTRUCTIONS:   Return to the emergency department if:   · You have severe pain  · You have sudden stiffness and heaviness on both buttocks down to both legs  · You have numbness or weakness in one leg, or pain in both legs  · You have numbness in your genital area or across your lower back  · You cannot control your urine or bowel movements  Call your doctor if:   · You have a fever  · You have pain at night or when you rest     · Your pain does not get better with treatment  · You have pain that worsens when you cough or sneeze  · You suddenly feel something pop or snap in your back  · You have questions or concerns about your condition or care  Medicines: You may need any of the following:  · NSAIDs , such as ibuprofen, help decrease swelling, pain, and fever  This medicine is available with or without a doctor's order  NSAIDs can cause stomach bleeding or kidney problems in certain people  If you take blood thinner medicine, always ask your healthcare provider if NSAIDs are safe for you  Always read the medicine label and follow directions  · Acetaminophen  decreases pain and fever  It is available without a doctor's order  Ask how much to take and how often to take it  Follow directions  Read the labels of all other medicines you are using to see if they also contain acetaminophen, or ask your doctor or pharmacist  Acetaminophen can cause liver damage if not taken correctly  Do not use more than 4 grams (4,000 milligrams) total of acetaminophen in one day  · Muscle relaxers  decrease pain by relaxing the muscles in your lower spine  · Prescription pain medicine  may be given  Ask your healthcare provider how to take this medicine safely  Some prescription pain medicines contain acetaminophen   Do not take other medicines that contain acetaminophen without talking to your healthcare provider  Too much acetaminophen may cause liver damage  Prescription pain medicine may cause constipation  Ask your healthcare provider how to prevent or treat constipation  Self-care:   · Stay active  as much as you can without causing more pain  Bed rest could make your back pain worse  Start with some light exercises, such as walking  Avoid heavy lifting until your pain is gone  Ask for more information about the activities or exercises that are right for you  · Apply heat  on your back for 20 to 30 minutes every 2 hours for as many days as directed  Heat helps decrease pain and muscle spasms  Alternate heat and ice  · Apply ice  on your back for 15 to 20 minutes every hour or as directed  Use an ice pack, or put crushed ice in a plastic bag  Cover it with a towel before you apply it to your skin  Ice helps prevent tissue damage and decreases swelling and pain  Prevent acute low back pain:   · Use proper body mechanics  ? Bend at the hips and knees when you  objects  Do not bend from the waist  Use your leg muscles as you lift the load  Do not use your back  Keep the object close to your chest as you lift it  Try not to twist or lift anything above your waist     ? Change your position often when you stand for long periods of time  Rest one foot on a small box or footrest, and then switch to the other foot often  ? Try not to sit for long periods of time  When you do, sit in a straight-backed chair with your feet flat on the floor  Never reach, pull, or push while you are sitting  · Do exercises that strengthen your back muscles  Warm up before you exercise  Ask your healthcare provider the best exercises for you  · Maintain a healthy weight  Ask your healthcare provider what a healthy weight is for you  Ask him or her to help you create a weight loss plan if you are overweight      Follow up with your doctor as directed:  Return for a follow-up visit if you still have pain after 1 to 3 weeks of treatment  You may need to visit an orthopedist if your back pain lasts longer than 12 weeks  Write down your questions so you remember to ask them during your visits  © Copyright [employer] 2021 Information is for End User's use only and may not be sold, redistributed or otherwise used for commercial purposes  All illustrations and images included in CareNotes® are the copyrighted property of [employer]  or [employer]   The above information is an  only  It is not intended as medical advice for individual conditions or treatments  Talk to your doctor, nurse or pharmacist before following any medical regimen to see if it is safe and effective for you

## 2022-01-18 NOTE — PROGRESS NOTES
BMI Counseling: Body mass index is 53 93 kg/m²  The BMI is above normal  Nutrition recommendations include decreasing portion sizes, encouraging healthy choices of fruits and vegetables, decreasing fast food intake, consuming healthier snacks, limiting drinks that contain sugar, moderation in carbohydrate intake, increasing intake of lean protein, reducing intake of saturated and trans fat and reducing intake of cholesterol  Exercise recommendations include vigorous physical activity 75 minutes/week, exercising 3-5 times per week and strength training exercises  No pharmacotherapy was ordered  Patient referred to PCP  Rationale for BMI follow-up plan is due to patient being overweight or obese  Depression Screening and Follow-up Plan: Patient was screened for depression during today's encounter  They screened negative with a PHQ-2 score of 0       Assessment/Plan:    Pt is 23 yr old male presents in office for follow up request for medrol dose pack   He had a fall and lower back and hip injury was seen in the ER not pictures taken at the time however has been working with PT who recommended he may need a medrol dose pack      Pt has underlying prediabetes - he has not had follow up labs done   He was brought in to be evaluated - does have lower back spasms and inflammation- c/o incomplete urinary emptying  Has had issues in the past - under care of Urology  And extensive family history of prostate issues and cancer  - intermittent testicular pain - secondary to cyst ? Had US done in the past - seen by urology not much could be done   We will continue to monitor  If needed and if issue continues he is to follow up with urology or call me to order [address] again make sure whatever is going on is stable - in agreement   Will check urine for now     I have ordered follow up labs   Will send medrol dose pack for now however he is not to start until labs have returned and discussed - I want to make sure his sugars are ok and his diabetic panel has not changed     He is obese - will check renal and liver functions and follow up abnormal CKs in the past     Discussed hydration   Discussed healthy diet     He is expecting a baby in April up to date with TDAP   I will review US and see when the  Last ones were done   And will repeat if needed - scrotal US     Will call him as soon as results come back  Problem List Items Addressed This Visit        Nervous and Auditory    Acute right lumbar radiculopathy - Primary    Relevant Orders    Comprehensive metabolic panel (Completed)    CBC and differential (Completed)    TSH, 3rd generation (Completed)    Lipid panel    Hepatitis C antibody (Completed)    HIV 1/2 Antigen/Antibody (4th Generation) w Reflex [facility]    XR hip/pelv 2-3 vws left if performed    XR spine lumbar 2 or 3 views injury    XR hip/pelv 2-3 vws right if performed       Other    Testicle pain    Relevant Orders    US scrotum and testicles    US kidney and bladder    Body mass index (BMI) of 50 0 to 59 9 in [facility])    Relevant Orders    Comprehensive metabolic panel (Completed)    CBC and differential (Completed)    TSH, 3rd generation (Completed)    Lipid panel    Hepatitis C antibody (Completed)    HIV 1/2 Antigen/Antibody (4th Generation) w Reflex [facility]      Other Visit Diagnoses     Chronic midline low back pain without sciatica        Relevant Orders    Comprehensive metabolic panel (Completed)    CBC and differential (Completed)    TSH, 3rd generation (Completed)    Lipid panel    Hepatitis C antibody (Completed)    HIV 1/2 Antigen/Antibody (4th Generation) w Reflex [facility]    Screening for HIV (human immunodeficiency virus)        Relevant Orders    HIV 1/2 Antigen/Antibody (4th Generation) w Reflex [facility]    Need for hepatitis C screening test        Relevant Orders    Hepatitis C antibody (Completed)    Mixed hyperlipidemia        Relevant Orders    Lipid panel    Elevated CK        Relevant Orders    Creatine Kinase, Total    Elevated fasting glucose        Relevant Orders    HEMOGLOBIN A1C W/ EAG ESTIMATION (Completed)    Prediabetes        Relevant Orders    HEMOGLOBIN A1C W/ EAG ESTIMATION (Completed)    Incomplete emptying of bladder        Relevant Orders    PSA Total, Diagnostic    US scrotum and testicles    US kidney and bladder    Scrotal cyst        Relevant Orders    US scrotum and testicles    US kidney and bladder            Subjective:      Patient ID: Laina Blanc is a 23 y o  male  HPI    The following portions of the patient's history were reviewed and updated as appropriate:   Past Medical History:  He has a past medical history of Back pain and Testicle pain  ,  _______________________________________________________________________  Medical Problems:  does not have any pertinent problems on file ,  _______________________________________________________________________  Past Surgical History:   has no past surgical history on file ,  _______________________________________________________________________  Family History:  family history includes Cancer in his father and mother; Diabetes in his mother; Heart attack in his mother; Stroke in his father ,  _______________________________________________________________________  Social History:   reports that he has never smoked  He has never used smokeless tobacco  He reports current alcohol use  He reports that he does not use drugs  ,  _______________________________________________________________________  Allergies:  is allergic to shellfish allergy - food allergy and other     _______________________________________________________________________  Current Outpatient Medications   Medication Sig Dispense Refill    methocarbamol (ROBAXIN) 500 mg tablet Take 2 tablets (1,000 mg total) by mouth 2 (two) times a day for 7 days 28 tablet 0     No current facility-administered medications for this visit  _______________________________________________________________________  Review of Systems   Constitutional: Negative for chills, fatigue and fever  HENT: Negative for congestion, sinus pain and sore throat  Eyes: Negative  Respiratory: Negative for cough and shortness of breath  Cardiovascular: Negative for chest pain and palpitations  Gastrointestinal: Negative for abdominal distention, abdominal pain and nausea  Genitourinary: Positive for difficulty urinating  Difficulty emptying  Has had intermittent testicular pain       Musculoskeletal: Positive for arthralgias, back pain and gait problem  Currently in PT    Skin: Negative for rash  Neurological: Negative for weakness and headaches  Hematological: Negative for adenopathy  Psychiatric/Behavioral: Negative for sleep disturbance and suicidal ideas  The patient is not nervous/anxious  Objective:  Vitals:    01/18/22 0937   BP: 124/74   Pulse: 92   Resp: 18   Temp: 98 5 °F (36 9 °C)   SpO2: 98%   Weight: (!) 185 kg (408 lb 12 8 oz)   Height: 6' 1" (1 854 m)     Body mass index is 53 93 kg/m²  Physical Exam  Constitutional:       Appearance: He is obese  HENT:      Head: Atraumatic  Eyes:      Extraocular Movements: Extraocular movements intact  Cardiovascular:      Rate and Rhythm: Normal rate and regular rhythm  Pulses: Normal pulses  Heart sounds: Normal heart sounds  Pulmonary:      Effort: Pulmonary effort is normal       Breath sounds: Normal breath sounds  Abdominal:      Palpations: Abdomen is soft  Musculoskeletal:         General: Tenderness present  Comments: Low back pain and hip pain spasms   Under care of PT post fall    Neurological:      Mental Status: He is alert and oriented to person, place, and time     Psychiatric:         Mood and Affect: Mood normal          Behavior: Behavior normal         Show images for US scrotum and testicles    Study Result    Narrative & Impression   SCROTAL ULTRASOUND     INDICATION:    N50 819: Testicular pain, unspecified      COMPARISON: 7/8/2015      TECHNIQUE:   Ultrasound the scrotal contents was performed with a high frequency linear transducer utilizing volumetric sweep imaging as well as standard still image techniques  Imaging performed in longitudinal and transverse orientation  Color and   spectral Doppler evaluation also performed bilaterally      FINDINGS:     TESTES:   Testes are symmetric and normal in size      RIGHT testis = 4 7 x 2 8 x 3 3 cm   Normal contour with homogeneous smooth echotexture  No intratesticular mass lesion or calcifications      LEFT testis = 4 9 x 2 9 x 3 5 cm  Normal contour with homogeneous smooth echotexture  No intratesticular mass lesion or calcifications      Doppler flow within both testes is present and symmetric      EPIDIDYMIDES:   Normal Size  Doppler ultrasound demonstrates normal blood flow  There are small epididymal cyst(s) bilaterally, similar to the prior exam  Otherwise unremarkable      HYDROCELE:  No significant fluid present      VARICOCELE:  None present      SCROTUM:  Scrotal thickness and appearance within normal limits  No evidence for extratesticular mass or hernia demonstrated      IMPRESSION:     Small bilateral epididymal head cysts    Otherwise, unremarkable scrotal exam       Workstation performed: [id number]          Imaging    US scrotum and testicles (Order: [id number]) - 6/13/2018

## 2022-01-18 NOTE — Clinical Note
Please call pt and let him know I reviewed the last US he had and it was over 2 years ago and I have ordered new ones to repeat just in case they call him and please mail him the [address] please

## 2022-01-19 ENCOUNTER — OFFICE VISIT (OUTPATIENT)
Dept: PHYSICAL THERAPY | Facility: OTHER | Age: 27
End: 2022-01-19
Payer: COMMERCIAL

## 2022-01-19 ENCOUNTER — APPOINTMENT (OUTPATIENT)
Dept: RADIOLOGY | Facility: OTHER | Age: 27
End: 2022-01-19
Payer: COMMERCIAL

## 2022-01-19 ENCOUNTER — APPOINTMENT (OUTPATIENT)
Dept: LAB | Facility: CLINIC | Age: 27
End: 2022-01-19
Payer: COMMERCIAL

## 2022-01-19 DIAGNOSIS — M54.16 ACUTE RIGHT LUMBAR RADICULOPATHY: ICD-10-CM

## 2022-01-19 DIAGNOSIS — M54.50 ACUTE EXACERBATION OF CHRONIC LOW BACK PAIN: Primary | ICD-10-CM

## 2022-01-19 DIAGNOSIS — G89.29 ACUTE EXACERBATION OF CHRONIC LOW BACK PAIN: Primary | ICD-10-CM

## 2022-01-19 LAB
CHOLEST SERPL-MCNC: 216 MG/DL
HDLC SERPL-MCNC: 29 MG/DL
HIV 1+2 AB+HIV1 P24 AG SERPL QL IA: NORMAL
LDLC SERPL CALC-MCNC: 155 MG/DL (ref 0–100)
NONHDLC SERPL-MCNC: 187 MG/DL
PSA SERPL-MCNC: 0.3 NG/ML (ref 0–4)
RHEUMATOID FACT SER QL LA: NEGATIVE
RYE IGE QN: NEGATIVE
TRIGL SERPL-MCNC: 161 MG/DL

## 2022-01-19 PROCEDURE — 97112 NEUROMUSCULAR REEDUCATION: CPT | Performed by: PHYSICAL THERAPIST

## 2022-01-19 PROCEDURE — 36415 COLL VENOUS BLD VENIPUNCTURE: CPT

## 2022-01-19 PROCEDURE — 72100 X-RAY EXAM L-S SPINE 2/3 VWS: CPT

## 2022-01-19 PROCEDURE — 97140 MANUAL THERAPY 1/> REGIONS: CPT | Performed by: PHYSICAL THERAPIST

## 2022-01-19 PROCEDURE — 73502 X-RAY EXAM HIP UNI 2-3 VIEWS: CPT

## 2022-01-19 PROCEDURE — 80061 LIPID PANEL: CPT

## 2022-01-19 PROCEDURE — 97110 THERAPEUTIC EXERCISES: CPT | Performed by: PHYSICAL THERAPIST

## 2022-01-19 NOTE — PROGRESS NOTES
Daily Note     Today's date: 2022  Patient name: Clare Watson  : 1995  MRN: [id number]  Referring provider: Linda Shoemaker PT  Dx:   Encounter Diagnosis     ICD-10-CM    1  Acute exacerbation of chronic low back pain  M54 50     G89 29                   Subjective:  He reported overall feeling about 50% better  He was prescribed a medrol dose pack, which he will start when given clearance by PCP several labs ordered  Objective: See treatment diary below      Assessment: Tolerated treatment well  Patient demonstrated fatigue post treatment      Plan: Continue per plan of care        Precautions:     Manuals  1 10 22 1 12 22 1 19          LAD SIJ  MJ MJ prone  MJ prone  MJ prone           L S/L R SIJD mobiliasion  MJ MJ  MJ L-S as weel gr5  MJ L-S gr5           R hip MWM belt   MJ            ISTM laser SIJ  MJ              STM TRP paraspinals   MJ  MJ            Neuro Re-Ed              Mini   sqauts 20  sqauts 20  20          Cat camel   20  20  25          Alt arm   20 UE  20 UE  20 EUE 20 LE           Er into wall at knee   NV  NV  5';101          Nerve glides    10x  10x           Bridge     20          TB LPD /Rows     Red 30ea           Ther Ex              Table sldie   10''x10  10''x10  10''x10 prayer stretch on box           multifitus press along wall   5''x10  5''x10                                                                                                Ther Activity                                          Gait Training                                          Modalities

## 2022-01-21 ENCOUNTER — TELEPHONE (OUTPATIENT)
Dept: FAMILY MEDICINE CLINIC | Facility: CLINIC | Age: 27
End: 2022-01-21

## 2022-01-21 DIAGNOSIS — M54.41 ACUTE BILATERAL LOW BACK PAIN WITH BILATERAL SCIATICA: Primary | ICD-10-CM

## 2022-01-21 DIAGNOSIS — M54.42 ACUTE BILATERAL LOW BACK PAIN WITH BILATERAL SCIATICA: Primary | ICD-10-CM

## 2022-01-21 RX ORDER — METHYLPREDNISOLONE 4 MG/1
TABLET ORAL
Qty: 21 EACH | Refills: 0 | Status: SHIPPED | OUTPATIENT
Start: 2022-01-21

## 2022-01-24 ENCOUNTER — OFFICE VISIT (OUTPATIENT)
Dept: PHYSICAL THERAPY | Facility: OTHER | Age: 27
End: 2022-01-24
Payer: COMMERCIAL

## 2022-01-24 DIAGNOSIS — M54.50 ACUTE EXACERBATION OF CHRONIC LOW BACK PAIN: Primary | ICD-10-CM

## 2022-01-24 DIAGNOSIS — G89.29 ACUTE EXACERBATION OF CHRONIC LOW BACK PAIN: Primary | ICD-10-CM

## 2022-01-24 PROCEDURE — 97110 THERAPEUTIC EXERCISES: CPT | Performed by: PHYSICAL THERAPIST

## 2022-01-24 PROCEDURE — 97112 NEUROMUSCULAR REEDUCATION: CPT | Performed by: PHYSICAL THERAPIST

## 2022-01-24 PROCEDURE — 97140 MANUAL THERAPY 1/> REGIONS: CPT | Performed by: PHYSICAL THERAPIST

## 2022-01-24 NOTE — PROGRESS NOTES
Daily Note     Today's date: 2022  Patient name: Kingston Arnold  : 1995  MRN: [id number]  Referring provider: Stephanie Bee PT  Dx:   No diagnosis found  Subjective:  He reported overall feeling about 50% better  T-S pain started seems to be myofasial and hypomobiltiy R sided  He did respond well to several exercises and we tried EPat to the region  Objective: See treatment diary below      Assessment: Tolerated treatment well  Patient demonstrated fatigue post treatment      Plan: Continue per plan of care        Precautions:     Manuals  1 10 22 1 12 22 1 19          LAD SIJ  MJ MJ prone  MJ prone  MJ prone           L S/L R SIJD mobiliasion  MJ MJ  MJ L-S as weel gr5  MJ L-S gr5           EPAT     2000 large metal head 2 0-2 1           ISTM laser SIJ  MJ              STM TRP paraspinals   MJ  MJ            Neuro Re-Ed              Mini   sqauts 20  sqauts 20  20          Cat camel   20  20  25          Alt arm   20 UE  20 UE  20 EUE 20 LE           Er into wall at knee   NV  NV  5';101          Nerve glides    10x  10x           Bridge     20          TB LPD /Rows     Red 30ea           Ther Ex              Table sldie   10''x10  10''x10  10''x10 prayer stretch on box           multifitus press along wall   5''x10  5''x10                                                                                                Ther Activity                                          Gait Training                                          Modalities

## 2022-01-26 ENCOUNTER — APPOINTMENT (OUTPATIENT)
Dept: PHYSICAL THERAPY | Facility: OTHER | Age: 27
End: 2022-01-26
Payer: COMMERCIAL

## 2022-01-27 ENCOUNTER — OFFICE VISIT (OUTPATIENT)
Dept: PHYSICAL THERAPY | Facility: OTHER | Age: 27
End: 2022-01-27
Payer: COMMERCIAL

## 2022-01-27 DIAGNOSIS — G89.29 ACUTE EXACERBATION OF CHRONIC LOW BACK PAIN: Primary | ICD-10-CM

## 2022-01-27 DIAGNOSIS — M54.50 ACUTE EXACERBATION OF CHRONIC LOW BACK PAIN: Primary | ICD-10-CM

## 2022-01-27 PROCEDURE — 97140 MANUAL THERAPY 1/> REGIONS: CPT | Performed by: PHYSICAL THERAPIST

## 2022-01-27 PROCEDURE — 97112 NEUROMUSCULAR REEDUCATION: CPT | Performed by: PHYSICAL THERAPIST

## 2022-01-27 NOTE — PROGRESS NOTES
Daily Note     Today's date: 2022  Patient name: Joy Zhu  : 1995  MRN: [id number]  Referring provider: Vandana Stewart PT  Dx:   Encounter Diagnosis     ICD-10-CM    1  Acute exacerbation of chronic low back pain  M54 50     G89 29                   Subjective:  chioropractice care yesterday sore from this treatment   Tolerated the addton of more stretching  He did respond well to several exercises and we tried Epat to the region  Objective: See treatment diary below      Assessment: Tolerated treatment well  Patient demonstrated fatigue post treatment      Plan: Continue per plan of care        Precautions:   Manuals  1 10 22 1 12 22 1 19 1 27          LAD SIJ  MJ MJ prone  MJ prone  MJ prone  NP            L S/L R SIJD mobiliasion  MJ MJ  MJ L-S as weel gr5  MJ L-S gr5  MJ L-S gr5           EPAT     2000 large metal head 2 0-2 1  2200 large metal head 2 0-2 1           ISTM laser SIJ  MJ               STM TRP paraspinals   MJ  MJ             Neuro Re-Ed               Mini   sqauts 20  sqauts 20  20 20          Cat camel   20  20  25 25          Alt arm   20 UE  20 UE  20 EUE 20 LE  20 EUE 20 LE           Er into wall at knee   NV  NV  5';101 5';101          lsider      3 way 5x           Nerve glides    10x  10x  10x           Bridge     20 25          TB LPD /Rows     Red 30ea  Red 30ea           Ther Ex               Table sldie   10''x10  10''x10  10''x10 prayer stretch on box  10''x10 prayer stretch on box           multifitus press along wall   5''x10  5''x10   Red TB with step out 20ea           bike 8min      8min                                                                                      Ther Activity                                             Gait Training                                             Modalities

## 2022-01-28 ENCOUNTER — APPOINTMENT (OUTPATIENT)
Dept: PHYSICAL THERAPY | Facility: OTHER | Age: 27
End: 2022-01-28
Payer: COMMERCIAL

## 2022-02-01 ENCOUNTER — TELEMEDICINE (OUTPATIENT)
Dept: FAMILY MEDICINE CLINIC | Facility: CLINIC | Age: 27
End: 2022-02-01
Payer: COMMERCIAL

## 2022-02-01 DIAGNOSIS — G89.29 CHRONIC MIDLINE LOW BACK PAIN WITH BILATERAL SCIATICA: Primary | ICD-10-CM

## 2022-02-01 DIAGNOSIS — M54.9 PAIN, UPPER BACK: ICD-10-CM

## 2022-02-01 DIAGNOSIS — M54.42 CHRONIC MIDLINE LOW BACK PAIN WITH BILATERAL SCIATICA: Primary | ICD-10-CM

## 2022-02-01 DIAGNOSIS — M54.41 CHRONIC MIDLINE LOW BACK PAIN WITH BILATERAL SCIATICA: Primary | ICD-10-CM

## 2022-02-01 PROCEDURE — 1036F TOBACCO NON-USER: CPT | Performed by: NURSE PRACTITIONER

## 2022-02-01 PROCEDURE — 99213 OFFICE O/P EST LOW 20 MIN: CPT | Performed by: NURSE PRACTITIONER

## 2022-02-01 NOTE — LETTER
February 1, 2022     Patient: Washington Hopkins   YOB: 1995   Date of Visit: 2/1/2022       To Whom it May Concern:    Washington Hopkins is under my professional care  He was seen in my office on 2/1/2022  He may return to work on 2/7/2022 light duty , avoind lifting heavier then 20 lbs , limited lifting bending pulling or pushing       If you have any questions or concerns, please don't hesitate to call           Sincerely,          RANDY De La Rosa        CC: No Recipients

## 2022-02-01 NOTE — PATIENT INSTRUCTIONS
Back Pain   WHAT YOU NEED TO KNOW:   Back pain is common  You may have back pain and muscle spasms  You may feel sore or stiff on one or both sides of your back  The pain may spread to your lower body  Conditions that affect the spine, joints, or muscles can cause back pain  These may include arthritis, spinal stenosis (narrowing of the spinal column), muscle tension, or breakdown of the spinal discs  DISCHARGE INSTRUCTIONS:   Call your local emergency number (911 in the [address] [facility]) if:   · You have severe back pain with chest pain  · You cannot control your urine or bowel movements  · Your pain becomes so severe that you cannot walk  Return to the emergency department if:   · You have pain, numbness, or weakness in one or both legs  · You have severe back pain, nausea, and vomiting  · You have severe back pain that spreads to your side or genital area  Call your doctor if:   · You have back pain that does not get better with rest and pain medicine  · You have a fever  · You have pain that worsens when you are on your back or when you rest     · You have pain that worsens when you cough or sneeze  · You lose weight without trying  · You have questions or concerns about your condition or care  Medicines: You may need any of the following:  · NSAIDs , such as ibuprofen, help decrease swelling, pain, and fever  This medicine is available with or without a doctor's order  NSAIDs can cause stomach bleeding or kidney problems in certain people  If you take blood thinner medicine, always ask your healthcare provider if NSAIDs are safe for you  Always read the medicine label and follow directions  · Acetaminophen  decreases pain and fever  It is available without a doctor's order  Ask how much to take and how often to take it  Follow directions   Read the labels of all other medicines you are using to see if they also contain acetaminophen, or ask your doctor or pharmacist  Acetaminophen can cause liver damage if not taken correctly  Do not use more than 4 grams (4,000 milligrams) total of acetaminophen in one day  · Muscle relaxers  help decrease muscle spasms and back pain  · Prescription pain medicine  may be given  Ask your healthcare provider how to take this medicine safely  Some prescription pain medicines contain acetaminophen  Do not take other medicines that contain acetaminophen without talking to your healthcare provider  Too much acetaminophen may cause liver damage  Prescription pain medicine may cause constipation  Ask your healthcare provider how to prevent or treat constipation  · Take your medicine as directed  Contact your healthcare provider if you think your medicine is not helping or if you have side effects  Tell him or her if you are allergic to any medicine  Keep a list of the medicines, vitamins, and herbs you take  Include the amounts, and when and why you take them  Bring the list or the pill bottles to follow-up visits  Carry your medicine list with you in case of an emergency  How to manage your back pain:   · Apply ice  on your back for 15 to 20 minutes every hour or as directed  Use an ice pack, or put crushed ice in a plastic bag  Cover it with a towel before you apply it to your skin  Ice helps prevent tissue damage and decreases pain  · Apply heat  on your back for 20 to 30 minutes every 2 hours for as many days as directed  Heat helps decrease pain and muscle spasms  · Stay active  as much as you can without causing more pain  Bed rest could make your back pain worse  Avoid heavy lifting until your pain is gone  · Go to physical therapy as directed  A physical therapist can teach you exercises to help improve movement and strength, and to decrease pain  Follow up with your doctor in 2 weeks, or as directed: You might need to see a specialist  Write down your questions so you remember to ask them during your visits    © Copyright [employer] 2021 Information is for [employer] use only and may not be sold, redistributed or otherwise used for commercial purposes  All illustrations and images included in CareNotes® are the copyrighted property of A D A M , Inc  or [place]  The above information is an  only  It is not intended as medical advice for individual conditions or treatments  Talk to your doctor, nurse or pharmacist before following any medical regimen to see if it is safe and effective for you

## 2022-02-03 ENCOUNTER — APPOINTMENT (OUTPATIENT)
Dept: PHYSICAL THERAPY | Facility: OTHER | Age: 27
End: 2022-02-03
Payer: COMMERCIAL

## 2022-02-04 NOTE — PROGRESS NOTES
Virtual Regular Visit    Verification of patient location:    Patient is located in the following state in which I hold an active license [place]      Assessment/Plan:    Presents in office via virtual visit   Would like to discuss returning to work   He has been out with low back pain  Has been working with PT for the last few months   Feel better and would like to return to work with restricted lifting  States that should not be an issue for his job if restrictions are needed or documentation of restrictions   He will need PT follow up and fill it out as he has not been seeing ortho  If again he starts having issues - he will need to follow up with ORTHO spine     Denies numbness and tingling  denies weakness     Problem List Items Addressed This Visit     None      Visit Diagnoses     Chronic midline low back pain with bilateral sciatica    -  Primary    under care of PT   he has been doing ok   here to discuss retgurning to work light duty   cant lift heavier then 20 lbs currently     Pain, upper back        better sees chiropractor and PT                Reason for visit is   Chief Complaint   Patient presents with    Follow-up     Back issues    Virtual Regular Visit        Encounter provider RANDY Spence    Provider located at [address] [place] [address] [address] [address] [phone]      Recent Visits  Date Type Provider Dept   02/01/22 [address] [facility] recent visits within past 7 days and meeting all other requirements  Future Appointments  No visits were found meeting these conditions  Showing future appointments within next 150 days and meeting all other requirements       The patient was identified by name and date of birth   Warner Feliciano was informed that this is a telemedicine visit and that the visit is being conducted through [address] [facility] and patient was informed that this is a secure, HIPAA-compliant platform  He agrees to proceed     My office door was closed  No one else was in the room  He acknowledged consent and understanding of privacy and security of the video platform  The patient has agreed to participate and understands they can discontinue the visit at any time  Patient is aware this is a billable service  Subjective  Edgar Bailey is a 23 y o  male    HPI     Past Medical History:   Diagnosis Date    Back pain     Testicle pain        History reviewed  No pertinent surgical history  Current Outpatient Medications   Medication Sig Dispense Refill    methocarbamol (ROBAXIN) 500 mg tablet Take 2 tablets (1,000 mg total) by mouth 2 (two) times a day for 7 days 28 tablet 0    methylPREDNISolone 4 MG tablet therapy pack Use as directed on package 21 each 0     No current facility-administered medications for this visit  Allergies   Allergen Reactions    Shellfish Allergy - Food Allergy Anaphylaxis    Other      Seasonal  pollen       Review of Systems   Constitutional: Negative for chills, fatigue and fever  HENT: Negative for congestion, sinus pain and sore throat  Eyes: Negative  Respiratory: Negative for cough and shortness of breath  Cardiovascular: Negative for chest pain and palpitations  Gastrointestinal: Negative for abdominal distention, abdominal pain and nausea  Genitourinary: Negative for difficulty urinating  Difficulty emptying  Has had intermittent testicular pain       Musculoskeletal: Positive for back pain (much improved )  Negative for arthralgias  Currently in PT    Skin: Negative for rash  Neurological: Negative for weakness and headaches  Hematological: Negative for adenopathy  Psychiatric/Behavioral: Negative for sleep disturbance and suicidal ideas  The patient is not nervous/anxious  Video Exam    There were no vitals filed for this visit      Physical Exam  Constitutional: Appearance: Normal appearance  Pulmonary:      Effort: Pulmonary effort is normal    Neurological:      Mental Status: He is alert and oriented to person, place, and time  Psychiatric:         Mood and Affect: Mood normal          Behavior: Behavior normal           I spent 15 minutes directly with the patient during this visit    VIRTUAL VISIT DISCLAIMER      Barbara Bowden verbally agrees to participate in [employer]  Pt is aware that [employer] could be limited without vital signs or the ability to perform a full hands-on physical Daiva Jobs understands he or the provider may request at any time to terminate the video visit and request the patient to seek care or treatment in person

## 2022-02-14 ENCOUNTER — OFFICE VISIT (OUTPATIENT)
Dept: PHYSICAL THERAPY | Facility: OTHER | Age: 27
End: 2022-02-14
Payer: COMMERCIAL

## 2022-02-14 DIAGNOSIS — G89.29 ACUTE EXACERBATION OF CHRONIC LOW BACK PAIN: Primary | ICD-10-CM

## 2022-02-14 DIAGNOSIS — M54.50 ACUTE EXACERBATION OF CHRONIC LOW BACK PAIN: Primary | ICD-10-CM

## 2022-02-14 PROCEDURE — 97110 THERAPEUTIC EXERCISES: CPT | Performed by: PHYSICAL THERAPIST

## 2022-02-14 PROCEDURE — 97140 MANUAL THERAPY 1/> REGIONS: CPT | Performed by: PHYSICAL THERAPIST

## 2022-02-14 PROCEDURE — 97112 NEUROMUSCULAR REEDUCATION: CPT | Performed by: PHYSICAL THERAPIST

## 2022-02-14 NOTE — PROGRESS NOTES
Daily Note     Today's date: 2022  Patient name: Laina Blanc  : 1995  MRN: [id number]  Referring provider: Radha Cheek PT  Dx:   No diagnosis found  Subjective: Added several more functional exercises he continues to progress to being able to return to work  Objective: See treatment diary below      Assessment: Tolerated treatment well  Patient demonstrated fatigue post treatment      Plan: Continue per plan of care        Precautions:       Manuals  1 10 22 1 12 22 1 19 1 27 2 14          LAD SIJ  MJ MJ prone  MJ prone  MJ prone  NP   NP            L S/L R SIJD mobiliasion  MJ MJ  MJ L-S as weel gr5  MJ L-S gr5  MJ L-S gr5  MJ L-S gr5           EPAT     2000 large metal head 2 0-2 1  2200 large metal head 2 0-2 1            ISTM laser SIJ  MJ                STM TRP paraspinals   MJ  MJ    MJ          Neuro Re-Ed                Mini   sqauts 20  sqauts 20  20 20 #10 10x3          Cat camel   20  20  25 25 25          Alt arm   20 UE  20 UE  20 EUE 20 LE  20 EUE 20 LE  20 EUE 20 LE           Er into wall at knee   NV  NV  5';101 5';101 5';10          lsider      3 way 5x  3 way 5x           Nerve glides    10x  10x  10x  10x           Bridge     20 25 25          TB LPD /Rows     Red 30ea  Red 30ea  Red 30ea           Ther Ex                Table sldie   10''x10  10''x10  10''x10 prayer stretch on box  10''x10 prayer stretch on box  10''x10 prayer stretch on box           multifitus press along wall   5''x10  5''x10   Red TB with step out 20ea  Red TB with step out 20ea           bike 8min      8min  8min           Dead lift to 4 in step       #10 10x2                                                                           Ther Activity                                                Gait Training                                                Modalities

## 2022-02-21 ENCOUNTER — OFFICE VISIT (OUTPATIENT)
Dept: PHYSICAL THERAPY | Facility: OTHER | Age: 27
End: 2022-02-21
Payer: COMMERCIAL

## 2022-02-21 DIAGNOSIS — M54.50 ACUTE EXACERBATION OF CHRONIC LOW BACK PAIN: Primary | ICD-10-CM

## 2022-02-21 DIAGNOSIS — G89.29 ACUTE EXACERBATION OF CHRONIC LOW BACK PAIN: Primary | ICD-10-CM

## 2022-02-21 PROCEDURE — 97140 MANUAL THERAPY 1/> REGIONS: CPT | Performed by: PHYSICAL THERAPIST

## 2022-02-21 PROCEDURE — 97112 NEUROMUSCULAR REEDUCATION: CPT | Performed by: PHYSICAL THERAPIST

## 2022-02-21 PROCEDURE — 97110 THERAPEUTIC EXERCISES: CPT | Performed by: PHYSICAL THERAPIST

## 2022-02-21 NOTE — PROGRESS NOTES
Daily Note     Today's date: 2022  Patient name: Ingrid Win  : 1995  MRN: [id number]  Referring provider: Rosa Maria Garcia, PT  Dx:   Encounter Diagnosis     ICD-10-CM    1  Acute exacerbation of chronic low back pain  M54 50     G89 29                   Subjective:  Progressing well pain is now low  He is tolerating some good strengthening and better ability to tolerate IADL's     Objective: See treatment diary below      Assessment: Tolerated treatment well  Patient demonstrated fatigue post treatment      Plan: Continue per plan of care        Precautions:       Manuals  1 10 22 1 12 22 1 19 1 27 2 14 2 21 22         LAD SIJ  MJ MJ prone  MJ prone  MJ prone  NP   NP            L S/L R SIJD mobiliasion  MJ MJ  MJ L-S as weel gr5  MJ L-S gr5  MJ L-S gr5  MJ L-S gr5           EPAT     2000 large metal head 2 0-2 1  2200 large metal head 2 0-2 1            ISTM laser SIJ  MJ                STM TRP paraspinals   MJ  MJ    MJ MJ         Neuro Re-Ed                Mini   sqauts 20  sqauts 20  20 20 #10 10x3 #15 10x3          Cat camel   20  20  25 25 25 25          Alt arm   20 UE  20 UE  20 EUE 20 LE  20 EUE 20 LE  20 EUE 20 LE  20each          Er into wall at knee   NV  NV  5';101 5';101 5';10 45''x10          lsider      3 way 5x  3 way 5x           Nerve glides    10x  10x  10x  10x  10x          Bridge     20 25 25          TB LPD /Rows     Red 30ea  Red 30ea  Red 30ea  Red 30          Ther Ex                Piriformis streth std        10''x10-          Table sldie   10''x10  10''x10  10''x10 prayer stretch on box  10''x10 prayer stretch on box  10''x10 prayer stretch on box  10''x10         multifitus press along wall   5''x10  5''x10   Red TB with step out 20ea  Red TB with step out 20ea  20x2          bike 8min      8min  8min  8min          Dead lift to 4 in step       #10 10x2  #15 10x2                                                                          Ther Activity Gait Training                                                Modalities

## 2022-02-28 ENCOUNTER — OFFICE VISIT (OUTPATIENT)
Dept: PHYSICAL THERAPY | Facility: OTHER | Age: 27
End: 2022-02-28
Payer: COMMERCIAL

## 2022-02-28 DIAGNOSIS — G89.29 ACUTE EXACERBATION OF CHRONIC LOW BACK PAIN: Primary | ICD-10-CM

## 2022-02-28 DIAGNOSIS — M54.50 ACUTE EXACERBATION OF CHRONIC LOW BACK PAIN: Primary | ICD-10-CM

## 2022-02-28 PROCEDURE — 97110 THERAPEUTIC EXERCISES: CPT | Performed by: PHYSICAL THERAPIST

## 2022-02-28 PROCEDURE — 97112 NEUROMUSCULAR REEDUCATION: CPT | Performed by: PHYSICAL THERAPIST

## 2022-02-28 PROCEDURE — 97140 MANUAL THERAPY 1/> REGIONS: CPT | Performed by: PHYSICAL THERAPIST

## 2022-02-28 NOTE — PROGRESS NOTES
Daily Note     Today's date: 2022  Patient name: Yadiel Philip  : 1995  MRN: [id number]  Referring provider: Jonelle Niño, PT  Dx:   Encounter Diagnosis     ICD-10-CM    1  Acute exacerbation of chronic low back pain  M54 50     G89 29                   Subjective:  Progressing well pain is now low  He is tolerating some good strengthening and better ability to tolerate IADL's     Objective: See treatment diary below      Assessment: Tolerated treatment well  Patient demonstrated fatigue post treatment      Plan: Continue per plan of care        Precautions:       Manuals  1 10 22 1 12 22 1 19 1 27 2 14 2 28 22         LAD SIJ  MJ MJ prone  MJ prone  MJ prone  NP   NP            L S/L R SIJD mobiliasion  MJ MJ  MJ L-S as weel gr5  MJ L-S gr5  MJ L-S gr5  MJ L-S gr5  MJ          EPAT     2000 large metal head 2 0-2 1  2200 large metal head 2 0-2 1   2 2 cermic head 2500 R paraspinal         ISTM laser SIJ  MJ                STM TRP paraspinals   MJ  MJ    MJ MJ         Neuro Re-Ed                Mini   sqauts 20  sqauts 20  20 20 #10 10x3 #15 10x3          Cat camel   20  20  25 25 25 25          Alt arm   20 UE  20 UE  20 EUE 20 LE  20 EUE 20 LE  20 EUE 20 LE  20each          Er into wall at knee   NV  NV  5';101 5';101 5';10 45''x10          slider       5x          lsider      3 way 5x  3 way 5x           Nerve glides    10x  10x  10x  10x  10x          Bridge     20 25 25          TB LPD /Rows     Red 30ea  Red 30ea  Red 30ea  Red 30          Ther Ex                Piriformis streth std        10''x10-          Table sldie   10''x10  10''x10  10''x10 prayer stretch on box  10''x10 prayer stretch on box  10''x10 prayer stretch on box  10''x10         multifitus press along wall   5''x10  5''x10   Red TB with step out 20ea  Red TB with step out 20ea  20x2          bike 8min      8min  8min  8min          Dead lift to 4 in step       #10 10x2  #40 10x2          Retro lunge trx        10x2 TRX row                                                 Ther Activity                                                Gait Training                                                Modalities

## 2022-09-07 ENCOUNTER — APPOINTMENT (EMERGENCY)
Dept: RADIOLOGY | Facility: HOSPITAL | Age: 27
End: 2022-09-07
Payer: COMMERCIAL

## 2022-09-07 ENCOUNTER — HOSPITAL ENCOUNTER (EMERGENCY)
Facility: HOSPITAL | Age: 27
Discharge: HOME | End: 2022-09-07
Attending: EMERGENCY MEDICINE
Payer: COMMERCIAL

## 2022-09-07 VITALS
OXYGEN SATURATION: 98 % | TEMPERATURE: 98.2 F | HEART RATE: 82 BPM | RESPIRATION RATE: 16 BRPM | DIASTOLIC BLOOD PRESSURE: 74 MMHG | SYSTOLIC BLOOD PRESSURE: 137 MMHG

## 2022-09-07 DIAGNOSIS — M54.9 BACK PAIN: Primary | ICD-10-CM

## 2022-09-07 DIAGNOSIS — M62.838 MUSCLE SPASM: ICD-10-CM

## 2022-09-07 PROCEDURE — 99284 EMERGENCY DEPT VISIT MOD MDM: CPT | Performed by: PHYSICIAN ASSISTANT

## 2022-09-07 PROCEDURE — 96372 THER/PROPH/DIAG INJ SC/IM: CPT

## 2022-09-07 PROCEDURE — 20552 NJX 1/MLT TRIGGER POINT 1/2: CPT | Performed by: PHYSICIAN ASSISTANT

## 2022-09-07 PROCEDURE — 72131 CT LUMBAR SPINE W/O DYE: CPT

## 2022-09-07 PROCEDURE — G1004 CDSM NDSC: HCPCS

## 2022-09-07 PROCEDURE — 99283 EMERGENCY DEPT VISIT LOW MDM: CPT

## 2022-09-07 RX ORDER — BUPIVACAINE HYDROCHLORIDE 5 MG/ML
5 INJECTION, SOLUTION EPIDURAL; INTRACAUDAL ONCE
Status: COMPLETED | OUTPATIENT
Start: 2022-09-07 | End: 2022-09-07

## 2022-09-07 RX ORDER — DIAZEPAM 5 MG/1
5 TABLET ORAL ONCE
Status: COMPLETED | OUTPATIENT
Start: 2022-09-07 | End: 2022-09-07

## 2022-09-07 RX ORDER — KETOROLAC TROMETHAMINE 30 MG/ML
15 INJECTION, SOLUTION INTRAMUSCULAR; INTRAVENOUS ONCE
Status: COMPLETED | OUTPATIENT
Start: 2022-09-07 | End: 2022-09-07

## 2022-09-07 RX ORDER — DIAZEPAM 5 MG/1
5 TABLET ORAL 2 TIMES DAILY
Qty: 5 TABLET | Refills: 0 | Status: SHIPPED | OUTPATIENT
Start: 2022-09-07 | End: 2022-09-09

## 2022-09-07 RX ADMIN — DIAZEPAM 5 MG: 5 TABLET ORAL at 15:28

## 2022-09-07 RX ADMIN — BUPIVACAINE HYDROCHLORIDE 5 ML: 5 INJECTION, SOLUTION EPIDURAL; INTRACAUDAL; PERINEURAL at 15:28

## 2022-09-07 RX ADMIN — KETOROLAC TROMETHAMINE 15 MG: 30 INJECTION, SOLUTION INTRAMUSCULAR at 15:28

## 2022-09-07 NOTE — Clinical Note
Laina Brockns was seen and treated in our emergency department on 9/7/2022  Diagnosis:     Porfirio Zhu  may return to work on return date  He may return on this date: 09/08/2022         If you have any questions or concerns, please don't hesitate to call        Yefri Barreto PA-C    ______________________________           _______________          _______________  Hospital Representative                              Date                                Time

## 2022-09-07 NOTE — Clinical Note
Barbara Bowden was seen and treated in our emergency department on 9/7/2022  Diagnosis:     Alayna Berry  may return to work on return date  He may return on this date: 09/08/2022         If you have any questions or concerns, please don't hesitate to call        Shivam Apple PA-C    ______________________________           _______________          _______________  Hospital Representative                              Date                                Time

## 2022-09-07 NOTE — ED PROVIDER NOTES
History  Chief Complaint   Patient presents with    Back Pain     Ambulatory to ED  C/o 7/10 pain left lower back x1 week, radiates down leg  No known trauma, just recently walking on dunes at [employer]  Endorses some difficulty urinating, but no urinary incontinence  +constipation  Hx R back pain, slipped disks, SI joint problems  Pt has tried to have MRI done in past without success  Mik Lombardi is a 15 yo who presents to the ED today with left lower back pain  Similar to when he gets his chronic right lower back pain  Patient denies: iv drug use, fever, numbness, weakness, saddle anesthesia, rash, UA symptoms, sob and vomiting  Took ton of motrin to help with pain  Has a 5 mo at home  Mother then wife at bedside  Patient has to travel for work tomorrow  Recently went on vacation was a dune buggy at [employer] before pain started  Patient denies heavy lifting  Patient wants shots in the back similar to last time he was in the ED  No fall/truama  Goes to chiropractor  Goes to spine specialist  Wants a CT today, because unable to get imaging and PCP asked him to get some as he was denied MRI by insurance  Prior to Admission Medications   Prescriptions Last Dose Informant Patient Reported? Taking? methocarbamol (ROBAXIN) 500 mg tablet   No No   Sig: Take 2 tablets (1,000 mg total) by mouth 2 (two) times a day for 7 days   methylPREDNISolone 4 MG tablet therapy pack   No No   Sig: Use as directed on package      Facility-Administered Medications: None       Past Medical History:   Diagnosis Date    Back pain     Testicle pain        No past surgical history on file  Family History   Problem Relation Age of Onset    Diabetes Mother     Cancer Mother     Heart attack Mother     Stroke Father     Cancer Father      I have reviewed and agree with the history as documented      E-Cigarette/Vaping    E-Cigarette Use Never User      E-Cigarette/Vaping Substances     Social History     Tobacco Use    Smoking status: Never Smoker    Smokeless tobacco: Never Used   Vaping Use    Vaping Use: Never used   Substance Use Topics    Alcohol use: Yes     Comment: socially    Drug use: No       Review of Systems   Constitutional: Negative for fatigue and fever  HENT: Negative for sore throat  Respiratory: Negative for cough and shortness of breath  Cardiovascular: Negative for chest pain  Gastrointestinal: Negative for abdominal pain, nausea and vomiting  Genitourinary: Negative for difficulty urinating, dysuria and flank pain  Musculoskeletal: Positive for back pain  Negative for gait problem  Skin: Negative for rash and wound  Allergic/Immunologic: Negative for immunocompromised state  Neurological: Negative for dizziness, syncope, weakness, numbness and headaches  Psychiatric/Behavioral: Negative for behavioral problems  All other systems reviewed and are negative  Physical Exam  Physical Exam  Vitals and nursing note reviewed  Constitutional:       General: He is in acute distress  Appearance: He is well-developed  He is obese  He is not ill-appearing, toxic-appearing or diaphoretic  HENT:      Head: Normocephalic and atraumatic  Eyes:      Extraocular Movements: Extraocular movements intact  Conjunctiva/sclera: Conjunctivae normal    Cardiovascular:      Rate and Rhythm: Normal rate and regular rhythm  Heart sounds: Normal heart sounds  Pulmonary:      Effort: Pulmonary effort is normal       Breath sounds: Normal breath sounds  Abdominal:      Palpations: Abdomen is soft  Tenderness: There is no abdominal tenderness  Musculoskeletal:      Cervical back: Normal, normal range of motion and neck supple  Thoracic back: Normal       Lumbar back: Swelling, spasms and tenderness present  No deformity or lacerations  Back:       Comments: No saddle anesthesia  normal strength LE bilaterally  Pulses pedal intact   Normal dorsiflexion and plantar flexion of bl great toes and feet  Normal skin  No rash on back  No trauma  Skin:     General: Skin is warm and dry  Neurological:      Mental Status: He is alert and oriented to person, place, and time  Psychiatric:         Behavior: Behavior normal          Vital Signs  ED Triage Vitals [09/07/22 1338]   Temperature Pulse Respirations Blood Pressure SpO2   98 2 °F (36 8 °C) 82 16 137/74 98 %      Temp Source Heart Rate Source Patient Position - Orthostatic VS BP Location FiO2 (%)   Oral Monitor Sitting Left arm --      Pain Score       7           Vitals:    09/07/22 1338   BP: 137/74   Pulse: 82   Patient Position - Orthostatic VS: Sitting         Visual Acuity      ED Medications  Medications   bupivacaine (PF) (MARCAINE) 0 5 % injection 5 mL (5 mL Infiltration Given 9/7/22 1528)   ketorolac (TORADOL) injection 15 mg (15 mg Intramuscular Given 9/7/22 1528)   diazepam (VALIUM) tablet 5 mg (5 mg Oral Given 9/7/22 1528)       Diagnostic Studies  Results Reviewed     None                 CT spine lumbar without contrast   Final Result by Miguel Brown MD (09/07 1620)      1  Straightening of the lumbar lordosis is chronic and could suggest of muscle spasm as a contributor to the patient's chronic pain  No acute findings, though  2   Mild spondylosis at L4-5 and L5-S1 with mild right foraminal stenoses  No areas of high-grade neural impingement  3   Hepatic steatosis  Workstation performed: [id number]                    Procedures  Trigger Injection 1 or 2 muscles    Date/Time: 9/7/2022 4:49 PM  Performed by: Misty Peña PA-C  Authorized by: Misty Peña PA-C     Patient location:  ED  Consent:     Consent obtained:  Verbal    Consent given by:  Patient  Universal protocol:     Patient identity confirmed:  Verbally with patient  Location:     Body area:  Lower back    Injection Trigger Points:  1  Pre-procedure details:     Procedure prep: alcohol    Skin anesthesia:     Skin anesthesia method: None  Procedure details:     Needle gauge:  25 G    Anesthetic injected:  Bupivacaine 0 5% w/o epi    Steroid injected:  None    Additive injected:  None    Injection procedure:  Anatomic landmarks identified, incremental injection, introduced needle, negative aspiration for blood and anatomic landmarks palpated  Post-procedure details:     Dressing: bandaid  Outcome:  Pain relieved    Patient tolerance of procedure: Tolerated well, no immediate complications             ED Course               MDM    Disposition  Final diagnoses:   Back pain   Muscle spasm     Time reflects when diagnosis was documented in both MDM as applicable and the Disposition within this note     Time User Action Codes Description Comment    9/7/2022  4:11 PM Carter Matthew Add [M54 9] Back pain     9/7/2022  4:51 PM Carter Vanegas [B32 [id number]] Muscle spasm       ED Disposition     ED Disposition   Discharge    Condition   Stable    Date/Time   Wed Sep 7, 2022  4:11 PM    Comment   Walter Alford discharge to home/self care                 Follow-up Information     Follow up With Specialties Details Why Contact Info Additional Information    RANDY Abrams Nurse Practitioner, Family Medicine In 1 week  [address] [place] [address] Pain Medicine Schedule an appointment as soon as possible for a visit   [address] [address] [phone]  [facility], [address] [place] [address], [phone] [address] Emergency Department Emergency Medicine  If symptoms worsen [address] [phone]  [address] Emergency Department, [address], [address]          Discharge Medication List as of 9/7/2022  4:17 PM      START taking these medications    Details   diazepam (VALIUM) 5 mg tablet Take 1 tablet (5 mg total) by mouth 2 (two) times a day for 2 days, Starting Wed 9/7/2022, Until Fri 9/9/2022, Normal         CONTINUE these medications which have NOT CHANGED    Details   methocarbamol (ROBAXIN) 500 mg tablet Take 2 tablets (1,000 mg total) by mouth 2 (two) times a day for 7 days, Starting Fri 12/31/2021, Until Fri 1/7/2022, Normal      methylPREDNISolone 4 MG tablet therapy pack Use as directed on package, Normal             No discharge procedures on file      PDMP Review     None          ED Provider  Electronically Signed by           Lucia Hunt PA-C  09/07/22 5305

## 2022-09-12 ENCOUNTER — TELEPHONE (OUTPATIENT)
Dept: FAMILY MEDICINE CLINIC | Facility: CLINIC | Age: 27
End: 2022-09-12

## 2022-09-12 NOTE — TELEPHONE ENCOUNTER
Patient called back and states he would like to stay in [facility]   He is due for a physical   Please call and make appt

## 2022-09-12 NOTE — LETTER
Luis M Mcdonough,      We have been trying to get in touch with you in regards to an appointment with Awilda Valenciaeamon  You are currently due to come in  She relocated to [facility] in May  We need to know if you are planning to follow her up here or staying in [facility] [place]  Please give us a call and advise what you would like to do  We hope all is well        [facility]

## 2022-09-12 NOTE — TELEPHONE ENCOUNTER
Left patient a message to call to schedule an appointment with one of our providers to establish care

## 2022-09-12 NOTE — TELEPHONE ENCOUNTER
Attempted to contact pt since June about Vera's location change  My chart messages sent  Letter sent

## 2023-01-04 ENCOUNTER — OFFICE VISIT (OUTPATIENT)
Dept: FAMILY MEDICINE CLINIC | Facility: CLINIC | Age: 28
End: 2023-01-04

## 2023-01-04 VITALS
OXYGEN SATURATION: 99 % | TEMPERATURE: 98.7 F | HEART RATE: 79 BPM | HEIGHT: 74 IN | WEIGHT: 315 LBS | RESPIRATION RATE: 16 BRPM | DIASTOLIC BLOOD PRESSURE: 76 MMHG | BODY MASS INDEX: 40.43 KG/M2 | SYSTOLIC BLOOD PRESSURE: 112 MMHG

## 2023-01-04 DIAGNOSIS — G89.29 CHRONIC RIGHT SHOULDER PAIN: Primary | ICD-10-CM

## 2023-01-04 DIAGNOSIS — L91.8 SKIN TAG: ICD-10-CM

## 2023-01-04 DIAGNOSIS — Z00.00 HEALTHCARE MAINTENANCE: ICD-10-CM

## 2023-01-04 DIAGNOSIS — M25.511 CHRONIC RIGHT SHOULDER PAIN: Primary | ICD-10-CM

## 2023-01-04 NOTE — ASSESSMENT & PLAN NOTE
Patient has multiple skin tags located over his left shoulder and neck  Only 1 large skin tag is painful otherwise the rest do not bother him    He would like them removed if possible   -Schedule appointment for skin tag removal in office

## 2023-01-04 NOTE — PROGRESS NOTES
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  He was never formally evaluated for rotator cuff tear or other shoulder injury  The pain waxes and wanes, but never completely resolves  Physical exam suggestive of chronic rotator cuff tear  Likely needs MRI for definitive diagnosis   -Ambulatory referral to orthopedic surgery, sports medicine    Orders:  -     Ambulatory Referral to Orthopedic Surgery; Future    2  Skin tag  Assessment & Plan:  Patient has multiple skin tags located over his left shoulder and neck  Only 1 large skin tag is painful otherwise the rest do not bother him  He would like them removed if possible   -Schedule appointment for skin tag removal in office      3  Healthcare maintenance  Assessment & Plan:  Patient's previous PCP has moved away, will come to this clinic for primary care going forward  - Will schedule formal annual physical in the next 6 months      BMI Counseling: Body mass index is 53 87 kg/m²  The BMI is above normal  Nutrition recommendations include decreasing fast food intake and limiting drinks that contain sugar  Exercise recommendations include exercising 3-5 times per week  Rationale for BMI follow-up plan is due to patient being overweight or obese  Subjective      Patient is a 23-year-old male presenting with complaints of right shoulder pain  He has had the shoulder pain for approximately 5 to 8 years  He used to be a power , 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  He reports that the injury was sustained while actively lifting weights    He has not had formal evaluation or imaging of the shoulder  His pain waxes and wanes but has been getting progressively worse over time  He has now been experiencing numbness/tingling in the right arm associated with increased pain  He has not experienced any weakness in the right arm  He also would like skin tags on his left shoulder neck evaluated  They are very bothersome and sometimes painful as he wears collared shirts at work  Otherwise he feels well, his back pain has been well controlled, and physical therapy has been completed  Review of Systems   Constitutional: Negative  HENT: Negative  Eyes: Negative  Respiratory: Negative  Cardiovascular: Negative  Gastrointestinal: Negative  Genitourinary: Negative  Musculoskeletal: Positive for back pain  Skin:        Multiple skin tags on left shoulder neck   Neurological: Negative  Psychiatric/Behavioral: Negative  Current Outpatient Medications on File Prior to Visit   Medication Sig   • diazepam (VALIUM) 5 mg tablet Take 1 tablet (5 mg total) by mouth 2 (two) times a day for 2 days   • methocarbamol (ROBAXIN) 500 mg tablet Take 2 tablets (1,000 mg total) by mouth 2 (two) times a day for 7 days   • methylPREDNISolone 4 MG tablet therapy pack Use as directed on package (Patient not taking: Reported on 1/4/2023)       Objective     /76 (BP Location: Left arm, Patient Position: Sitting, Cuff Size: Large)   Pulse 79   Temp 98 7 °F (37 1 °C) (Temporal)   Resp 16   Ht 6' 2" (1 88 m)   Wt (!) 190 kg (419 lb 9 6 oz)   SpO2 99%   BMI 53 87 kg/m²     Physical Exam  Constitutional:       Appearance: Normal appearance  He is obese  He is not ill-appearing  HENT:      Head: Normocephalic  Mouth/Throat:      Mouth: Mucous membranes are moist       Pharynx: Oropharynx is clear  No posterior oropharyngeal erythema  Eyes:      Extraocular Movements: Extraocular movements intact  Pupils: Pupils are equal, round, and reactive to light     Neck: Comments: Multiple skin tags across left aspect of neck and upper surface of the left shoulder  Cardiovascular:      Rate and Rhythm: Normal rate and regular rhythm  Pulses: Normal pulses  Heart sounds: Normal heart sounds  Pulmonary:      Effort: Pulmonary effort is normal       Breath sounds: Normal breath sounds  Abdominal:      Tenderness: There is no abdominal tenderness  Musculoskeletal:      Right shoulder: Tenderness present  No swelling, deformity, effusion or crepitus  Decreased range of motion  Left shoulder: Normal       Right elbow: Normal       Left elbow: Normal       Right wrist: Normal       Left wrist: Normal       Right hand: Normal       Left hand: Normal       Cervical back: Normal range of motion  Right lower leg: No edema  Left lower leg: No edema  Comments: R: Empty can positive, discomfort with passive external rotation   Skin:     General: Skin is warm and dry  Capillary Refill: Capillary refill takes less than 2 seconds  Findings: No rash  Neurological:      General: No focal deficit present  Mental Status: He is alert and oriented to person, place, and time     Psychiatric:         Mood and Affect: Mood normal          Behavior: Behavior normal        Horacio Urbano MD

## 2023-01-04 NOTE — ASSESSMENT & PLAN NOTE
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

## 2023-01-04 NOTE — ASSESSMENT & PLAN NOTE
Patient's previous PCP has moved away, will come to this clinic for primary care going forward    - Will schedule formal annual physical in the next 6 months

## 2023-01-06 ENCOUNTER — APPOINTMENT (OUTPATIENT)
Dept: RADIOLOGY | Facility: AMBULARY SURGERY CENTER | Age: 28
End: 2023-01-06
Attending: ORTHOPAEDIC SURGERY

## 2023-01-06 ENCOUNTER — OFFICE VISIT (OUTPATIENT)
Dept: OBGYN CLINIC | Facility: CLINIC | Age: 28
End: 2023-01-06

## 2023-01-06 VITALS — HEIGHT: 74 IN | BODY MASS INDEX: 40.43 KG/M2 | WEIGHT: 315 LBS

## 2023-01-06 DIAGNOSIS — S43.431A TEAR OF RIGHT GLENOID LABRUM, INITIAL ENCOUNTER: Primary | ICD-10-CM

## 2023-01-06 DIAGNOSIS — M25.511 CHRONIC RIGHT SHOULDER PAIN: ICD-10-CM

## 2023-01-06 DIAGNOSIS — G89.29 CHRONIC RIGHT SHOULDER PAIN: ICD-10-CM

## 2023-01-06 NOTE — PROGRESS NOTES
Assessment:  1  Tear of right glenoid labrum, initial encounter  MRI arthrogram right shoulder      2  Chronic right shoulder pain  Ambulatory Referral to Orthopedic Surgery    XR shoulder 2+ vw right        Patient Active Problem List   Diagnosis   • Testicle pain   • Acute right lumbar radiculopathy   • Hand pain, right   • Acute pain of right wrist   • Chronic seasonal allergic rhinitis   • Body mass index (BMI) of 50 0 to 59 9 in [facility])   • Chronic right shoulder pain   • Skin tag   • Healthcare maintenance           Plan      New xrays of the right shoulder were obtained today and reviewed with the patient  On exam patient has positive provocative testing with O'Briens along with the history on mechanism of injury, it is highly recommended to obtain an MRI arthrogram of the right shoulder to further evaluate for labral tear  We will follow-up with the patient to review MRI for surgical intervention  Subjective:     Patient ID:    Chief Complaint:Hugo Gibbs 33 y o  male      HPI    Patient comes in today with regards to Right shoulder referred by PCP Dr Yaz Loya  The patient reports that the pain is in the anterior aspect of the shoulder and has been going on for   He has a previous dislocation/relocation episode when he was 18 year while power lifting, racking weights  He most recently was throwing his daughter in the air and the shoulder gave out when her weight overhead  The pain is rated at6 at its best and7 at its worst    The pain is described as dull ache  It is worsened with weight over head, and is made better with keeping anything of weight close to the body  The patient has taken  for treatment        The following portions of the patient's history were reviewed and updated as appropriate: allergies, current medications, past family history, past social history, past surgical history and problem list         Social History     Socioeconomic History   • Marital status: /Civil [employer]     Spouse name: Not on file   • Number of children: Not on file   • Years of education: Not on file   • Highest education level: Not on file   Occupational History   • Not on file   Tobacco Use   • Smoking status: Never   • Smokeless tobacco: Never   Vaping Use   • Vaping Use: Never used   Substance and Sexual Activity   • Alcohol use: Yes     Comment: socially   • Drug use: No   • Sexual activity: Not on file   Other Topics Concern   • Not on file   Social History Narrative   • Not on file     Social Determinants of Health     Financial Resource Strain: Low Risk    • Difficulty of Paying Living Expenses: Not very hard   Food Insecurity: No Food Insecurity   • Worried About Running Out of Food in the Last Year: Never true   • Ran Out of Food in the Last Year: Never true   Transportation Needs: No Transportation Needs   • Lack of Transportation (Medical): No   • Lack of Transportation (Non-Medical): No   Physical Activity: Not on file   Stress: No Stress Concern Present   • Feeling of Stress : Not at all   Social Connections: Not on file   Intimate Partner Violence: Not on file   Housing Stability: Unknown   • Unable to Pay for Housing in the Last Year: No   • Number of Places Lived in the Last Year: Not on file   • Unstable Housing in the Last Year: No     Past Medical History:   Diagnosis Date   • Back pain    • Testicle pain      No past surgical history on file    Allergies   Allergen Reactions   • Shellfish Allergy - Food Allergy Anaphylaxis   • Other      Seasonal  pollen     Current Outpatient Medications on File Prior to Visit   Medication Sig Dispense Refill   • diazepam (VALIUM) 5 mg tablet Take 1 tablet (5 mg total) by mouth 2 (two) times a day for 2 days 5 tablet 0   • methocarbamol (ROBAXIN) 500 mg tablet Take 2 tablets (1,000 mg total) by mouth 2 (two) times a day for 7 days 28 tablet 0   • methylPREDNISolone 4 MG tablet therapy pack Use as directed on package (Patient not taking: Reported on 1/4/2023) 21 each 0     No current facility-administered medications on file prior to visit  Objective:    Review of Systems    Right Shoulder Exam     Range of Motion   Active abduction: 479 (with clicking with abduction)   Forward flexion: 160     Muscle Strength   Abduction: 5/5   Supraspinatus: 5/5   Subscapularis: 5/5   Biceps: 4/5     Other   Erythema: absent  Sensation: normal  Pulse: present    Comments:  + Speeds test  + galvin's test      Left Shoulder Exam     Tenderness   The patient is experiencing tenderness in the biceps tendon  Range of Motion   Active abduction: 160   Forward flexion: 180     Muscle Strength   Supraspinatus: 5/5   Subscapularis: 5/5   Biceps: 5/5             Physical Exam  Vitals reviewed  Constitutional:       Appearance: Normal appearance  HENT:      Head: Atraumatic  Eyes:      Extraocular Movements: Extraocular movements intact  Skin:     General: Skin is warm and dry  Neurological:      Mental Status: He is alert and oriented to person, place, and time  Psychiatric:         Mood and Affect: Mood normal          Behavior: Behavior normal          Procedures  No Procedures performed today    I have personally reviewed pertinent films in PACS and my interpretation is X-rays right shoulder:        Portions of the record may have been created with voice recognition software   Occasional wrong word or "sound a like" substitutions may have occurred due to the inherent limitations of voice recognition software   Read the chart carefully and recognize, using context, where substitutions have occurred

## 2023-02-01 ENCOUNTER — PROCEDURE VISIT (OUTPATIENT)
Dept: FAMILY MEDICINE CLINIC | Facility: CLINIC | Age: 28
End: 2023-02-01

## 2023-02-01 VITALS
DIASTOLIC BLOOD PRESSURE: 70 MMHG | OXYGEN SATURATION: 96 % | HEART RATE: 79 BPM | BODY MASS INDEX: 54.49 KG/M2 | TEMPERATURE: 97.5 F | WEIGHT: 315 LBS | SYSTOLIC BLOOD PRESSURE: 113 MMHG

## 2023-02-01 DIAGNOSIS — L98.9 PAINFUL SKIN LESION: Primary | ICD-10-CM

## 2023-02-01 DIAGNOSIS — L91.8 SKIN TAG: ICD-10-CM

## 2023-02-01 DIAGNOSIS — R40.0 DAYTIME SOMNOLENCE: ICD-10-CM

## 2023-02-01 NOTE — ASSESSMENT & PLAN NOTE
Patient is complaining of daytime somnolence and recurrent unrestful sleep  Partner reports he snores regularly  I suspect he has undiagnosed obstructive sleep apnea  He is requesting a sleep study today    - Sleep study ordered with specialist follow-up pending report

## 2023-02-01 NOTE — PROGRESS NOTES
Name: Carisa Ayala      : 1995      MRN: [id number]  Encounter Provider: Donna Silva MD  Encounter Date: 2023   Encounter department: [address] [place] [address]     1  Skin tag  Assessment & Plan:  Patient presents to office today for removal of multiple painful skin lesions  The skin lesions are located on his neck and shoulder area are very painful  He wears a collared shirt for work and the skin tags will often catch on the collar and begin to bleed  7 large skin lesions, suspect skin tags, were removed from the patient's neck area  One was sent for pathology  - See procedure note for further details  Orders:  -     Tissue Exam; Future  -     Tissue Exam  -     Skin tag removal    2  Daytime somnolence  Assessment & Plan:  Patient is complaining of daytime somnolence and recurrent unrestful sleep  Partner reports he snores regularly  I suspect he has undiagnosed obstructive sleep apnea  He is requesting a sleep study today  - Sleep study ordered with specialist follow-up pending report    Orders:  -     Diagnostic Sleep Study; Future; Expected date: 2023    3  Painful skin lesion  Assessment & Plan:  Patient presents to office today for removal of multiple painful skin lesions  The skin lesions are located on his neck and shoulder area are very painful  He wears a collared shirt for work and the skin tags will often catch on the collar and begin to bleed  7 large skin lesions, suspect skin tags, were removed from the patient's neck area  One was sent for pathology  - See procedure note for further details  Skin tag removal    Date/Time: 2023 3:48 PM  Performed by: Donna Silva MD  Authorized by: Donna Silva MD   Universal Protocol:  Consent: Verbal consent obtained  Written consent not obtained    Consent given by: patient  Patient understanding: patient states understanding of the procedure being performed  Patient identity confirmed: verbally with patient        Procedure Details - Skin Tag Destruction:     Up to 15      Body area:  Head/neck    Head/neck location:  Neck    Malignancy: malignancy unknown      Destruction method: curettage    Lesion 2:     Body area:  [address]    Head/neck location:  Neck    Malignancy: malignancy unknown      Destruction method: curettage    Lesion 3:     Body area:  [address]    Head/neck location:  Neck    Malignancy: malignancy unknown      Destruction method: curettage    Lesion 4:     Body area:  [address]    Head/neck location:  Neck    Malignancy: malignancy unknown      Destruction method: curettage    Lesion 5:     Body area:  [address]    Head/neck location:  Neck    Malignancy: malignancy unknown      Destruction method: curettage    Lesion 6:     Body area:  Head/neck    Head/neck location:  Neck    Malignancy: malignancy unknown      Destruction method: curettage         Multiple skin tags across neck and upper shoulder area  Painful to patient and often gets stuck on clothing and will cause bleeding  Total of 7 skin tags removed  Largest skin tag sent for pathology in formalin  Suspect cutaneous horn  Hemostasis achieved on all sites  Cautery, silver nitrate, aluminum chloride used to achieve hemostasis  All sites covered with sterile petroleum  Largest site covered with Band-Aid  Patient advised to leave the largest site alone overnight and avoid washing or disturbing the area  He understood that this will take the next 7 to 10 days to fully heal       Subjective      Patient reports to clinic today for skin tag removal   He is also requesting a sleep study secondary to persistent daytime somnolence, unrestful sleep, heavy snoring  Review of Systems   Musculoskeletal: Positive for arthralgias and neck pain (2/2 large skin tags)  Psychiatric/Behavioral: Positive for sleep disturbance         Current Outpatient Medications on File Prior to Visit   Medication Sig   • diazepam (VALIUM) 5 mg tablet Take 1 tablet (5 mg total) by mouth 2 (two) times a day for 2 days   • methocarbamol (ROBAXIN) 500 mg tablet Take 2 tablets (1,000 mg total) by mouth 2 (two) times a day for 7 days   • methylPREDNISolone 4 MG tablet therapy pack Use as directed on package (Patient not taking: Reported on 1/4/2023)       Objective     /70 (BP Location: Right arm, Patient Position: Sitting, Cuff Size: Large)   Pulse 79   Temp 97 5 °F (36 4 °C) (Temporal)   Wt (!) 193 kg (424 lb 6 4 oz)   SpO2 96%   BMI 54 49 kg/m²     Physical Exam  Constitutional:       General: He is not in acute distress  Appearance: Normal appearance  HENT:      Head: Normocephalic  Nose: Nose normal  No rhinorrhea  Eyes:      General: No scleral icterus  Right eye: No discharge  Left eye: No discharge  Extraocular Movements: Extraocular movements intact  Neck:      Comments: Multiple skin tags on neck and shoulder area  Large skin tag on left neck likely cutaneous horn  Removed during procedure  Musculoskeletal:      Cervical back: Normal range of motion  Tenderness (TTP over skin tags) present  Skin:     General: Skin is warm and dry  Findings: Lesion present  No rash  Neurological:      General: No focal deficit present  Mental Status: He is alert and oriented to person, place, and time  Mental status is at baseline     Psychiatric:         Mood and Affect: Mood normal          Behavior: Behavior normal        Debbe Nyhan, MD

## 2023-02-01 NOTE — ASSESSMENT & PLAN NOTE
Patient presents to office today for removal of multiple painful skin lesions  The skin lesions are located on his neck and shoulder area are very painful  He wears a collared shirt for work and the skin tags will often catch on the collar and begin to bleed  7 large skin lesions, suspect skin tags, were removed from the patient's neck area  One was sent for pathology  - See procedure note for further details

## 2023-02-06 ENCOUNTER — HOSPITAL ENCOUNTER (OUTPATIENT)
Dept: MRI IMAGING | Facility: HOSPITAL | Age: 28
Discharge: HOME | End: 2023-02-06
Attending: ORTHOPAEDIC SURGERY

## 2023-02-06 ENCOUNTER — HOSPITAL ENCOUNTER (OUTPATIENT)
Dept: RADIOLOGY | Facility: HOSPITAL | Age: 28
Discharge: HOME | End: 2023-02-06
Attending: ORTHOPAEDIC SURGERY | Admitting: RADIOLOGY

## 2023-02-06 DIAGNOSIS — S43.431A TEAR OF RIGHT GLENOID LABRUM, INITIAL ENCOUNTER: ICD-10-CM

## 2023-02-06 DIAGNOSIS — G89.29 CHRONIC RIGHT SHOULDER PAIN: ICD-10-CM

## 2023-02-06 DIAGNOSIS — M25.511 CHRONIC RIGHT SHOULDER PAIN: ICD-10-CM

## 2023-02-06 RX ORDER — LIDOCAINE HYDROCHLORIDE 10 MG/ML
10 INJECTION, SOLUTION EPIDURAL; INFILTRATION; INTRACAUDAL; PERINEURAL
Status: COMPLETED | OUTPATIENT
Start: 2023-02-06 | End: 2023-02-06

## 2023-02-06 RX ADMIN — IOHEXOL 5 ML: 300 INJECTION, SOLUTION INTRAVENOUS at 13:40

## 2023-02-06 RX ADMIN — GADOBUTROL 0.2 ML: 604.72 INJECTION INTRAVENOUS at 14:18

## 2023-02-06 RX ADMIN — LIDOCAINE HYDROCHLORIDE 7 ML: 10 INJECTION, SOLUTION EPIDURAL; INFILTRATION; INTRACAUDAL at 13:40

## 2023-02-07 ENCOUNTER — TELEPHONE (OUTPATIENT)
Dept: SLEEP CENTER | Facility: CLINIC | Age: 28
End: 2023-02-07

## 2023-02-07 NOTE — TELEPHONE ENCOUNTER
----- Message from Marichuy Ji MD sent at 2/7/2023  9:05 AM EST -----  Approved    ----- Message -----  From: Salvador Sigala  Sent: 2/6/2023   9:16 AM EST  To: [facility] Provider    This Diagnostic sleep study needs approval      If approved please sign and return to clerical pool  If denied please include reasons why  Also provide alternative testing if warranted  Please sign and return to clerical pool

## 2023-02-09 ENCOUNTER — TELEPHONE (OUTPATIENT)
Dept: SLEEP CENTER | Facility: CLINIC | Age: 28
End: 2023-02-09

## 2023-02-09 NOTE — TELEPHONE ENCOUNTER
----- Message from Aureliano Clark MD sent at 2/8/2023  9:18 AM EST -----  Approved    ----- Message -----  From: Josh Alcaraz  Sent: 2/6/2023   9:16 AM EST  To: Sleep Medicine [facility] Provider    This Diagnostic sleep study needs approval      If approved please sign and return to clerical pool  If denied please include reasons why  Also provide alternative testing if warranted  Please sign and return to clerical pool

## 2023-02-13 ENCOUNTER — OFFICE VISIT (OUTPATIENT)
Dept: OBGYN CLINIC | Facility: CLINIC | Age: 28
End: 2023-02-13

## 2023-02-13 VITALS — BODY MASS INDEX: 40.43 KG/M2 | HEIGHT: 74 IN | WEIGHT: 315 LBS

## 2023-02-13 DIAGNOSIS — M25.311 SHOULDER INSTABILITY, RIGHT: ICD-10-CM

## 2023-02-13 DIAGNOSIS — R20.0 NUMBNESS OF RIGHT HAND: Primary | ICD-10-CM

## 2023-02-13 NOTE — PROGRESS NOTES
Assessment:  1  Numbness of right hand  EMG 1 Limb      2  Shoulder instability, right  Ambulatory Referral to Physical Therapy        Patient Active Problem List   Diagnosis   • Testicle pain   • Acute right lumbar radiculopathy   • Hand pain, right   • Acute pain of right wrist   • Chronic seasonal allergic rhinitis   • Body mass index (BMI) of 50 0 to 59 9 in adult [facility])   • Chronic right shoulder pain   • Painful skin lesion   • Healthcare maintenance   • Daytime somnolence   • Shoulder instability, right   • Numbness of right hand       Plan:    33 y o  male  with subjective right shoulder instability s/p anterior dislocation reported 8 years ago; right hand and forearm intermittent numbness    Explained to the patient that only explanation for his subjective shoulder instability is deconditioning weakness of the rotator cuff musculature which I explained to him are responsible for centering the humeral head and the glenoid  Referral to physical therapy to work on strengthening of the rotator cuff I explained to him that although he is done therapy in the past it was not targeted specifically at the shoulder and if he focuses on this for at least 6 weeks he should see improvements  Given his complaints of right hand numbness and tingling we will obtain EMG of the right upper extremity to rule out any cervical or brachial plexopathy or other peripheral neuropathy  If this is inconclusive or if he continues to have pain and weakness we will refer him to spine and pain as explained to him that only other source of his shoulder pain dysfunction will be coming from his cervical spine  Fortunately he had no sign of atrophy on his shoulder MRI suggesting any severe radiculopathy or neuropathy affecting the shoulder musculature    The assessment and plan were formulated by Dr Raúl Garcia and I assisted in carrying it out  Subjective:   Patient ID: William Pierre is a 33 y o  male       [facility]    Patient presents to the office for follow up of right shoulder pain  He is here for MRI review  Since the last visit, the patient reports persisting pain and feelings of subjective instability in the anterior shoulder  He also complains of numbness and tingling in the forearm and hand that is intermittent feels like this is present after sleeping at nighttime     Denies any recurrent dislocations since his last visit here  Denies any neck pain        The following portions of the patient's history were reviewed and updated as appropriate: allergies, current medications, past family history, past social history, past surgical history and problem list     Social History     Socioeconomic History   • Marital status: /Civil Union     Spouse name: Not on file   • Number of children: Not on file   • Years of education: Not on file   • Highest education level: Not on file   Occupational History   • Not on file   Tobacco Use   • Smoking status: Never   • Smokeless tobacco: Never   Vaping Use   • Vaping Use: Never used   Substance and Sexual Activity   • Alcohol use: Yes     Comment: socially   • Drug use: No   • Sexual activity: Not on file   Other Topics Concern   • Not on file   Social History Narrative   • Not on file     Social Determinants of Health     Financial Resource Strain: Low Risk    • Difficulty of Paying Living Expenses: Not very hard   Food Insecurity: No Food Insecurity   • Worried About Running Out of Food in the Last Year: Never true   • Ran Out of Food in the Last Year: Never true   Transportation Needs: No Transportation Needs   • Lack of Transportation (Medical): No   • Lack of Transportation (Non-Medical):  No   Physical Activity: Not on file   Stress: No Stress Concern Present   • Feeling of Stress : Not at all   Social Connections: Not on file   Intimate Partner Violence: Not on file   Housing Stability: Unknown   • Unable to Pay for Housing in the Last Year: No   • Number of Places Lived in the Last Year: Not on file   • Unstable Housing in the Last Year: No     Past Medical History:   Diagnosis Date   • Back pain    • Testicle pain      Past Surgical History:   Procedure Laterality Date   • FL INJECTION RIGHT SHOULDER (ARTHROGRAM)  2/6/2023     Allergies   Allergen Reactions   • Shellfish Allergy - Food Allergy Anaphylaxis   • Other      Seasonal  pollen     Current Outpatient Medications on File Prior to Visit   Medication Sig Dispense Refill   • diazepam (VALIUM) 5 mg tablet Take 1 tablet (5 mg total) by mouth 2 (two) times a day for 2 days 5 tablet 0   • methocarbamol (ROBAXIN) 500 mg tablet Take 2 tablets (1,000 mg total) by mouth 2 (two) times a day for 7 days 28 tablet 0   • methylPREDNISolone 4 MG tablet therapy pack Use as directed on package (Patient not taking: Reported on 1/4/2023) 21 each 0     No current facility-administered medications on file prior to visit  Review of Systems  See HPi    Objective: There were no vitals filed for this visit  Physical Exam    Right Shoulder Exam     Tenderness   The patient is experiencing no tenderness  Range of Motion   The patient has normal right shoulder ROM  Comments:  External rotation at 0 degrees with 4+ out of 5 strength, 5 out of 5 strength in all the other rotator cuff muscles  Patient does have slight apprehension with apprehension test and reduction in apprehension with reducing force  Negative Spurling's test  Strength 5 out of 5 C5-T1 bilaterally            I have personally reviewed pertinent films in PACS and my interpretation is MRI arthrogram of the right shoulder demonstrates no evidence of labral tear, biceps tendon tear, rotator cuff tear, or shoulder dislocation  No Hill-Sachs lesion  Procedures  No Procedures performed today        Portions of the record may have been created with voice recognition software    Occasional wrong word or "sound a like" substitutions may have occurred due to the inherent limitations of voice recognition software  Read the chart carefully and recognize, using context, where substitutions have occurred

## 2023-02-22 ENCOUNTER — EVALUATION (OUTPATIENT)
Dept: PHYSICAL THERAPY | Age: 28
End: 2023-02-22

## 2023-02-22 DIAGNOSIS — M75.41 IMPINGEMENT SYNDROME OF RIGHT SHOULDER: Primary | ICD-10-CM

## 2023-02-22 DIAGNOSIS — M25.311 SHOULDER INSTABILITY, RIGHT: ICD-10-CM

## 2023-02-22 NOTE — PROGRESS NOTES
PT Evaluation     Today's date: 2023  Patient name: Gifty Huynh  : 1995  MRN: [id number]  Referring provider: Thuy Ace  Dx:   Encounter Diagnosis     ICD-10-CM    1  Impingement syndrome of right shoulder  M75 41       2  Shoulder instability, right  M25 311 Ambulatory Referral to Physical Therapy                     Assessment  Assessment details: Pt is a 33year old male who presents to PT with R chronic superior shoulder pain with occasional N/T to the hand which began 9 years ago following a subluxation during a bench press  Pt presents with impaired shoulder strength and worsening painful motion with clicking that prevents him from lifting objects and holding his daughter  Pt presents with symptoms that are consistent with joint pathology due to clicking at 90 degrees of flexion and pain with compressing joint space  Pt would benefit from skilled PT in order to improve daily function and hold his daughter without pain  Impairments: abnormal coordination, abnormal muscle firing, abnormal or restricted ROM, impaired physical strength, lacks appropriate home exercise program and pain with function    Goals  STG:  Pt will demonstrate understanding of phase 1 HEP within 3 weeks  Pt will improve GH flexor MMT by 1/2 grade within 3 weeks  LTG:  Pt will demonstrate understanding of phase 2 HEP by discharge  Pt will improve GH flexor MMT by 1 grade by discharge      Plan  Patient would benefit from: skilled physical therapy and PT eval  Planned therapy interventions: manual therapy, motor coordination training, activity modification, neuromuscular re-education, body mechanics training, patient education, home exercise program, therapeutic exercise, therapeutic activities, coordination and functional ROM exercises  Frequency: 1x week  Duration in weeks: 8  Treatment plan discussed with: patient        Subjective Evaluation    History of Present Illness  Date of onset: 2014  Pain  Current pain ratin  Quality: sharp and throbbing  Relieving factors: ice  Aggravating factors: overhead activity and lifting  Progression: worsening      Diagnostic Tests  MRI studies: normal  Treatments  Previous treatment: physical therapy  Patient Goals  Patient goals for therapy: increased strength, decreased pain, increased motion, improved balance and return to sport/leisure activities          Objective     Neurological Testing     Sensation     Shoulder   Left Shoulder   Intact: light touch    Right Shoulder   Diminished: light touch    Passive Range of Motion   Left Shoulder   Flexion: WFL  Extension: WFL  Abduction: WFL  External rotation 90°: WFL  Internal rotation 90°: WFL    Right Shoulder   Flexion: WFL  Extension: WFL  Abduction: WFL  External rotation 90°: WFL  Internal rotation 90°: WFL    Additional Passive Range of Motion Details  Pain at end range flexion and abduction    Joint Play   Left Shoulder  Hypomobile in the anterior capsule, posterior capsule and inferior capsule  Right Shoulder  Hypomobile in the anterior capsule, posterior capsule and inferior capsule  Strength/Myotome Testing     Left Shoulder   Normal muscle strength    Right Shoulder     Planes of Motion   Flexion: 4   Abduction: 4   External rotation at 90°: 4   Internal rotation at 90°: 4     Tests     Right Shoulder   Positive crank, lift-off and painful arc  Negative anterior slide, apprehension, belly press, drop arm, Neer's, sulcus sign, ULTT1, ULTT2, ULTT3 and anterior slide                 Precautions: n/a      Manuals             PNF Shoulder Stabilization                                                    Neuro Re-Ed             HEP - RTC Strengthening, Pec Stretch 10'                                                                                          Ther Ex             KAREN IR/ER             Scaption             Quadruped SA Punches             Rows Ther Activity                                       Gait Training                                       Modalities

## 2023-02-28 ENCOUNTER — HOSPITAL ENCOUNTER (OUTPATIENT)
Dept: NEUROLOGY | Facility: CLINIC | Age: 28
Discharge: HOME | End: 2023-02-28

## 2023-02-28 DIAGNOSIS — R20.0 NUMBNESS OF RIGHT HAND: ICD-10-CM

## 2023-03-01 ENCOUNTER — OFFICE VISIT (OUTPATIENT)
Dept: PHYSICAL THERAPY | Age: 28
End: 2023-03-01

## 2023-03-01 DIAGNOSIS — M25.311 SHOULDER INSTABILITY, RIGHT: Primary | ICD-10-CM

## 2023-03-01 DIAGNOSIS — M75.41 IMPINGEMENT SYNDROME OF RIGHT SHOULDER: ICD-10-CM

## 2023-03-01 NOTE — PROGRESS NOTES
Daily Note     Today's date: 3/1/2023  Patient name: Brittani Chen  : 1995  MRN: [id number]  Referring provider: Shwetha Mejia  Dx:   Encounter Diagnosis     ICD-10-CM    1  Shoulder instability, right  M25 311       2  Impingement syndrome of right shoulder  M75 41                      Subjective: Symptoms persist without significant change- pt reports compliance with HEP without issues  Objective: See treatment diary below    Assessment: pt with symptom reproduction with standing scaption- relieved with rest- overhead activities remain an irritating factor  Good target muscle activation with assigned therex this visit  Tolerated treatment well  Patient demonstrated fatigue post treatment, exhibited good technique with therapeutic exercises and would benefit from continued PT    Plan: Continue per plan of care  Progress treatment as tolerated         Precautions: n/a      Manuals 2/22 3/1           PNF Shoulder Stabilization  KM                                                  Neuro Re-Ed             HEP - RTC Strengthening, Pec Stretch 10'                                                                                          Ther Ex             UBE  /           KAREN IR/ER  20x 5s ea           Scaption  5# 3x10           Supine SA Punches  5# 10x, 10# 2x10           Rows, Ext [place]  3x10 25#, 15#           Body Blade  Med BB 4x30s                                                  Ther Activity                                       Gait Training                                       Modalities

## 2023-03-05 PROBLEM — Z00.00 HEALTHCARE MAINTENANCE: Status: RESOLVED | Noted: 2023-01-04 | Resolved: 2023-03-05

## 2023-03-08 ENCOUNTER — OFFICE VISIT (OUTPATIENT)
Dept: PHYSICAL THERAPY | Age: 28
End: 2023-03-08

## 2023-03-08 DIAGNOSIS — M75.41 IMPINGEMENT SYNDROME OF RIGHT SHOULDER: ICD-10-CM

## 2023-03-08 DIAGNOSIS — M25.311 SHOULDER INSTABILITY, RIGHT: Primary | ICD-10-CM

## 2023-03-08 NOTE — PROGRESS NOTES
Daily Note     Today's date: 3/8/2023  Patient name: William Pierre  : 1995  MRN: [id number]  Referring provider: Ross Valverde  Dx:   Encounter Diagnosis     ICD-10-CM    1  Shoulder instability, right  M25 311       2  Impingement syndrome of right shoulder  M75 41                      Subjective: Pt reports slightly improved strength however paresthesias persist      Objective: See treatment diary below    Assessment: Intermittent RUE paresthesia persists with therex, primarily ER and ext this visit into digits 2-5- PT Will Updegrove made aware of symptoms, evaluated pt directly- please refer to his addendum for further commentation if necessary  Tolerated treatment fair  Patient would benefit from continued PT if consistent progress towards PT goals can be achieved  Plan: Continue per plan of care  Progress treatment as tolerated         Precautions: n/a      Manuals 2/22 3/1 3/8          PNF Shoulder Stabilization  KM KM                                                 Neuro Re-Ed             HEP - RTC Strengthening, Pec Stretch 10'                                                                                          Ther Ex             UBE   8' FW          KAREN IR/ER  20x 5s ea 30x 5s ea          Scaption  5# 3x10 5# 3x10          Supine SA Punches  5# 10x, 10# 2x10 10# 3x10          Rows, Ext Lauren  3x10 25#, 15# 30#, 15# 3x10          Body Blade  Med BB 4x30s           SL ER   3# 3x10          Corner Pec Stretch   4x30s                       Ther Activity                                       Gait Training                                       Modalities

## 2023-03-15 ENCOUNTER — OFFICE VISIT (OUTPATIENT)
Dept: PHYSICAL THERAPY | Age: 28
End: 2023-03-15

## 2023-03-15 DIAGNOSIS — M25.311 SHOULDER INSTABILITY, RIGHT: Primary | ICD-10-CM

## 2023-03-15 DIAGNOSIS — M75.41 IMPINGEMENT SYNDROME OF RIGHT SHOULDER: ICD-10-CM

## 2023-03-15 NOTE — PROGRESS NOTES
Daily Note     Today's date: 3/15/2023  Patient name: Nini Caceres  : 1995  MRN: [id number]  Referring provider: Rahul Kiser  Dx:   Encounter Diagnosis     ICD-10-CM    1  Shoulder instability, right  M25 311       2  Impingement syndrome of right shoulder  M75 41                      Subjective: Pt reprots he woke up Monday with a stiff shoulder and R hand numbness- stiffness has persists, numbness resolved within 24hrs  Pt also reports decreased shoulder pain following last week's tx session prior to Monday's symptoms  PT Will Updegrove made aware of pt's sx  Objective: See treatment diary below      Assessment: No progressions to POC this visit given reported stiffness/soreness  Will continue to assess presence of R hand paresthesias related to activity/therex and modify POC as necessary  Tolerated treatment fair  Patient demonstrated fatigue post treatment, exhibited good technique with therapeutic exercises and would benefit from continued PT if consistent progress towards therapeutic goals can be achieved  Plan: Continue per plan of care  Progress treatment as tolerated         Precautions: n/a      Manuals 2/22 3/1 3/8 3/15         PNF Shoulder Stabilization  KM KM KM                                                Neuro Re-Ed             HEP - RTC Strengthening, Pec Stretch 10'                                                                                          Ther Ex             UBE  5/ 8' FW 8' fw         Pulley's    5'          KAREN IR/ER  20x 5s ea 30x 5s ea 30x5s ea         Scaption  5# 3x10 5# 3x10 5# 3x10         Supine SA Punches  5# 10x, 10# 2x10 10# 3x10 3# 3x10         Rows, Ext Lauren  3x10 25#, 15# 30#, 15# 3x10 30#, 15# 3x10         Body Blade  Med BB 4x30s           SL ER   3# 3x10 0# 3x10         Corner Pec Stretch   4x30s 4x30s                      Ther Activity                                       Gait Training Modalities

## 2023-03-22 ENCOUNTER — APPOINTMENT (OUTPATIENT)
Dept: PHYSICAL THERAPY | Age: 28
End: 2023-03-22

## 2023-03-24 ENCOUNTER — CONSULT (OUTPATIENT)
Dept: PAIN MEDICINE | Facility: CLINIC | Age: 28
End: 2023-03-24

## 2023-03-24 VITALS
WEIGHT: 315 LBS | BODY MASS INDEX: 40.43 KG/M2 | DIASTOLIC BLOOD PRESSURE: 96 MMHG | HEIGHT: 74 IN | SYSTOLIC BLOOD PRESSURE: 155 MMHG | HEART RATE: 86 BPM

## 2023-03-24 DIAGNOSIS — R20.0 NUMBNESS OF RIGHT HAND: ICD-10-CM

## 2023-03-24 DIAGNOSIS — M54.12 CERVICAL RADICULOPATHY: Primary | ICD-10-CM

## 2023-03-24 RX ORDER — METHYLPREDNISOLONE 4 MG/1
TABLET ORAL
Qty: 1 EACH | Refills: 0 | Status: SHIPPED | OUTPATIENT
Start: 2023-03-24

## 2023-03-24 NOTE — PROGRESS NOTES
Assessment  1  Cervical radiculopathy    2  Numbness of right hand        Plan  78-year-old male referred by orthopedics, presenting for initial consultation regarding right shoulder pain that radiates into the right upper extremity to the first 3 digits of the right hand with associated numbness and subjective weakness for the past 9 years  Symptoms began when the patient was bench pressing and suffered what sounds like a posterior dislocation which reduced spontaneously  He does occasionally have some neck pain  EMG of the right upper extremity was normal   MRI arthrogram of the right shoulder was normal   He does not have any imaging of the cervical spine to review  He has done physical therapy for his right shoulder and has not noticed too much relief with this  He has not done any therapy for his neck  Tylenol and OTC NSAIDs do provide some transient relief  He did try a course of oral steroids in the past, but this was for a low back injury  He has not tried any oral steroids for his neck or arm complaints  The patient's symptoms may be secondary to cervical radiculopathy  No evidence of shoulder pathology or neural injury on EMG  1   I will order physical therapy to see if he will respond to Jemma-based therapy for his cervical region  2  I will order an x-ray of the cervical spine  3  I will prescribe Medrol Dosepak for pain and symptomatic relief  4  I will follow-up with the patient in 6 weeks and if he does not respond to conservative treatment we will order an MRI of the cervical spine without contrast      My impressions and treatment recommendations were discussed in detail with the patient who verbalized understanding and had no further questions  Discharge instructions were provided  I personally saw and examined the patient and I agree with the above discussed plan of care  No orders of the defined types were placed in this encounter      No orders of the defined types were placed in this encounter  History of Present Illness    Hermann Hood is a 33 y o  male referred by orthopedics, presenting for initial consultation regarding right shoulder pain that radiates into the right upper extremity to the first 3 digits of the right hand with associated numbness and subjective weakness for the past 9 years  Symptoms began when the patient was bench pressing and suffered what sounds like a posterior dislocation which reduced spontaneously  He does occasionally have some neck pain  He denies any left upper extremity symptoms, bladder or bowel incontinence, or balance issues  EMG of the right upper extremity was normal   MRI arthrogram of the right shoulder was normal   He does not have any imaging of the cervical spine to review  He has done physical therapy for his right shoulder and has not noticed too much relief with this  He has not done any therapy for his neck  Tylenol and OTC NSAIDs do provide some transient relief  He did try a course of oral steroids in the past, but this was for a low back injury  He has not tried any oral steroids for his neck or arm complaints  The patient rates his pain a 6 out of 10 and the pain is nearly constant  The pain does not follow any particular pattern throughout the day  The pain is described as shooting, numbness, sharp, dull, and aching  The pain is decreased with lying down and increased with exercise  He does find some relief with heat and ice application  Other than as stated above, the patient denies any interval changes in medications, medical condition, mental condition, symptoms, or allergies since the last office visit  I have personally reviewed and/or updated the patient's past medical history, past surgical history, family history, social history, current medications, allergies, and vital signs today       Review of Systems    Patient Active Problem List   Diagnosis   • Testicle pain   • Acute right lumbar radiculopathy   • Hand pain, right   • Acute pain of right wrist   • Chronic seasonal allergic rhinitis   • Body mass index (BMI) of 50 0 to 59 9 in adult [facility])   • Chronic right shoulder pain   • Painful skin lesion   • Daytime somnolence   • Shoulder instability, right   • Numbness of right hand       Past Medical History:   Diagnosis Date   • Back pain    • Testicle pain        Past Surgical History:   Procedure Laterality Date   • FL INJECTION RIGHT SHOULDER (ARTHROGRAM)  2/6/2023       Family History   Problem Relation Age of Onset   • Diabetes Mother    • Cancer Mother    • Heart attack Mother    • Stroke Father    • Cancer Father        Social History     Occupational History   • Not on file   Tobacco Use   • Smoking status: Never   • Smokeless tobacco: Never   Vaping Use   • Vaping Use: Never used   Substance and Sexual Activity   • Alcohol use: Yes     Comment: socially   • Drug use: No   • Sexual activity: Not on file       Current Outpatient Medications on File Prior to Visit   Medication Sig   • diazepam (VALIUM) 5 mg tablet Take 1 tablet (5 mg total) by mouth 2 (two) times a day for 2 days   • methocarbamol (ROBAXIN) 500 mg tablet Take 2 tablets (1,000 mg total) by mouth 2 (two) times a day for 7 days   • methylPREDNISolone 4 MG tablet therapy pack Use as directed on package (Patient not taking: Reported on 1/4/2023)     No current facility-administered medications on file prior to visit  Allergies   Allergen Reactions   • Shellfish Allergy - Food Allergy Anaphylaxis   • Other      Seasonal  pollen       Physical Exam    /96   Pulse 86   Ht 6' 2" (1 88 m)   Wt (!) 192 kg (423 lb)   BMI 54 31 kg/m²     Constitutional: normal, well developed, well nourished, alert, in no distress and non-toxic and no overt pain behavior    Eyes: anicteric  HEENT: grossly intact  Neck: supple, symmetric, trachea midline and no masses   Pulmonary:even and unlabored  Cardiovascular:No edema or pitting edema present  Skin:Normal without rashes or lesions and well hydrated  Psychiatric:Mood and affect appropriate  Neurologic:Cranial Nerves II-XII grossly intact  Musculoskeletal:normal gait  Bilateral cervical paraspinals nontender to palpation  Tenderness palpation of her right trapezius  Mild tenderness to palpation along the right AC joint and the lateral aspect of the right shoulder  Bilateral biceps, triceps, and brachioradialis reflexes were 1 out of 4 and symmetrical   Negative Perez's reflex bilaterally  Bilateral upper extremity strength 5 out of 5 in all muscular's  Sensation intact to light touch in C5-T1 dermatomes bilaterally  Negative Spurling's bilaterally  Imaging    PACS Images     Show images for MRI arthrogram right shoulder  Study Result    Narrative & Impression   MRI ARTHROGRAM RIGHT SHOULDER     INDICATION:   O13 108T: Superior glenoid labrum lesion of right shoulder, initial encounter  Dr Juan Church note from 1/6/2023 was reviewed  Patient has anterior shoulder pain after dislocation/relocation event when he was 17years old power lifting  Patient recently had an episode of shoulder giving out  There is suspicion for labral tear      COMPARISON: Right shoulder plain films from 1/6/2023      TECHNIQUE:  Multiplanar/multisequence MR of the right shoulder was performed  Scan was performed after intraarticular injection of dilute gadolinium under fluoroscopic guidance into the joint         FINDINGS:     SUBCUTANEOUS TISSUES: Normal     JOINT CAPSULE: Intact, without inferior extravasation of contrast       ACROMION PROCESS: Normal      ROTATOR CUFF: Intact      SUBACROMIAL/SUBDELTOID BURSA: Normal       LONG HEAD OF BICEPS TENDON: Normal      GLENOID LABRUM: Intact      GLENOHUMERAL JOINT: Intact      ACROMIOCLAVICULAR JOINT:  Normal      BONES: Normal   There is no evidence of Hill-Sachs fracture      IMPRESSION:     Normal MR arthrogram of the right shoulder    Specifically, there is no evidence of labral tear, and no evidence of prior glenohumeral joint dislocation            Workstation performed: [id number]     PACS Images     Show images for XR shoulder 2+ vw right  Study Result    Narrative & Impression   RIGHT SHOULDER     INDICATION:   M25 511: Pain in right shoulder  G89 29: Other chronic pain      COMPARISON:  None     VIEWS:  XR SHOULDER 2+ VW RIGHT  Images: 3     FINDINGS:     There is no acute fracture or dislocation      No significant degenerative changes      No lytic or blastic osseous lesion      Soft tissues are unremarkable      IMPRESSION:     No acute osseous abnormality      Workstation performed: [id number]     PACS Images     Show images for CT upper extremity wo contrast right  Study Result    Narrative & Impression   CT right hand without IV contrast     INDICATION: M79 641: Pain in right hand  M85 641: Other cyst of bone, right hand      COMPARISON: Radiographs 11/8/2021  MRI 2/28/2021     TECHNIQUE: CT examination of the right hand was performed  This examination, like all CT scans performed in the [facility], was performed utilizing techniques to minimize radiation dose exposure, including the use of iterative   reconstruction and automated exposure control software  Sagittal and coronal two dimensional reconstructed images were also submitted for interpretation      Rad dose  237 18 mGy-cm      FINDINGS:     OSSEOUS STRUCTURES:  No fracture, dislocation or destructive osseous lesion    No no lytic or lucent lesion identified      VISUALIZED MUSCULATURE:  Unremarkable      SOFT TISSUES:  Unremarkable      OTHER PERTINENT FINDINGS:  None         IMPRESSION:     Normal CT of the right hand              Workstation performed: [id number]

## 2023-03-27 ENCOUNTER — HOSPITAL ENCOUNTER (OUTPATIENT)
Dept: SLEEP CENTER | Facility: CLINIC | Age: 28
Discharge: HOME | End: 2023-03-27

## 2023-03-27 DIAGNOSIS — R40.0 DAYTIME SOMNOLENCE: ICD-10-CM

## 2023-03-28 NOTE — PROGRESS NOTES
Sleep Study Documentation    Pre-Sleep Study       Sleep testing procedure explained to patient:YES    Patient napped prior to study:NO    Caffeine:Dayshift worker after 12PM   Caffeine use:Coffee 6 to 18oz    Alcohol:Dayshift workers after 5PM: Alcohol use:NO    Typical day for patient:YES       Study Documentation    Sleep Study Indications: BMI>30, Unrefreshing sleep, EDS    Sleep Study: Diagnostic   Snore:Severe  Supplemental O2: no    O2 flow rate (L/min) range   O2 flow rate (L/min) final   Minimum SaO2 79%  Baseline SaO2 97%        Mode of Therapy:    EKG abnormalities: no     EEG abnormalities: no    Sleep Study Recorded < 2 hours: N/A    Sleep Study Recorded > 2 hours but incomplete study: N/A    Sleep Study Recorded 6 hours but no sleep obtained: NO    Patient classification: employed       Post-Sleep Study    Medication used at bedtime or during sleep study:NO    Patient reports time it took to fall asleep:greater than 60 minutes    Patient reports waking up during study:3 or more times  Patient reports returning to sleep in 10 to 30 minutes  Patient reports sleeping 2 to 4 hours without dreaming  Patient reports sleep during study:worse than usual    Patient rated sleepiness: Very sleepy or tired    PAP treatment:no

## 2023-03-29 ENCOUNTER — OFFICE VISIT (OUTPATIENT)
Dept: PHYSICAL THERAPY | Age: 28
End: 2023-03-29

## 2023-03-29 DIAGNOSIS — M54.12 CERVICAL RADICULOPATHY: ICD-10-CM

## 2023-03-29 DIAGNOSIS — M25.311 SHOULDER INSTABILITY, RIGHT: Primary | ICD-10-CM

## 2023-03-29 DIAGNOSIS — M75.41 IMPINGEMENT SYNDROME OF RIGHT SHOULDER: ICD-10-CM

## 2023-03-29 NOTE — PROGRESS NOTES
Daily Note     Today's date: 3/29/2023  Patient name: Quinn Mcneil  : 1995  MRN: [id number]  Referring provider: Nan Quintana  Dx:   Encounter Diagnosis     ICD-10-CM    1  Shoulder instability, right  M25 311       2  Cervical radiculopathy  M54 12 Ambulatory referral to Physical Therapy      3  Impingement syndrome of right shoulder  M75 41                      Subjective: Pt reports decreased presence of R hand paraesthesia symptoms however R shoulder/scapular pain persists daily  Objective: See treatment diary below    Assessment: POC progressions inappropriate this visit given high symptom irritability  Pt continues to experience exacerbation of R hand paresthesia symptoms into digits 3-4 with shoulder therex  R shoulder pain persists daily- minimal progress towards therapeutic goals to this point- pt was given referral for cervical evaluation by pain management- discussed with supervising PT transitioning to new episode of care next visit  Tolerated treatment well  Patient demonstrated fatigue post treatment, exhibited good technique with therapeutic exercises and would benefit from continued PT    Plan: Continue per plan of care  Progress treatment as tolerated         Precautions: n/a      Manuals 2/22 3/1 3/8 3/15 3/29        PNF Shoulder Stabilization  KM KM KM KM                                               Neuro Re-Ed             HEP - RTC Strengthening, Pec Stretch 10'                                                                                          Ther Ex             UBE  5/5 8' FW 8' fw 8' fw        Pulley's    5'          KAREN IR/ER  20x 5s ea 30x 5s ea 30x5s ea 15x ea        Scaption  5# 3x10 5# 3x10 5# 3x10 4# 3x10        Supine SA Punches  5# 10x, 10# 2x10 10# 3x10 3# 3x10 8# 3x10        Rows, Ext Lauren  3x10 25#, 15# 30#, 15# 3x10 30#, 15# 3x10 30#, 15# 3x10        Body Blade  Med BB 4x30s           SL ER   3# 3x10 0# 3x10 0# 15x        Corner Pec Stretch 4x30s 4x30s 5x30s                     Ther Activity                                       Gait Training                                       Modalities

## 2023-03-31 ENCOUNTER — TELEPHONE (OUTPATIENT)
Dept: SLEEP CENTER | Facility: CLINIC | Age: 28
End: 2023-03-31

## 2023-03-31 NOTE — TELEPHONE ENCOUNTER
----- Message from Puma Joyce MD sent at 3/31/2023  7:49 AM EDT -----  Regarding: very severe MYRIAM  Urgent sleep consult recommended   AHI > 100 on sleep study r/o withdrawal/ SI

## 2023-04-03 ENCOUNTER — OFFICE VISIT (OUTPATIENT)
Dept: SLEEP CENTER | Facility: CLINIC | Age: 28
End: 2023-04-03

## 2023-04-03 VITALS
HEIGHT: 73 IN | HEART RATE: 88 BPM | DIASTOLIC BLOOD PRESSURE: 77 MMHG | BODY MASS INDEX: 41.75 KG/M2 | WEIGHT: 315 LBS | SYSTOLIC BLOOD PRESSURE: 124 MMHG

## 2023-04-03 DIAGNOSIS — R04.0 EPISTAXIS: ICD-10-CM

## 2023-04-03 DIAGNOSIS — G47.33 OSA (OBSTRUCTIVE SLEEP APNEA): Primary | ICD-10-CM

## 2023-04-03 NOTE — PROGRESS NOTES
Assessment/Plan:    1  MYRIAM (obstructive sleep apnea)  -     Ambulatory Referral to Otolaryngology; Future  -     CPAP Study; Future; Expected date: 04/03/2023    2  Epistaxis  -     Ambulatory Referral to Otolaryngology; Future  -     CBC and differential; Future  -     Protime-INR; Future  We reviewed his history in detail-I am concerned as he has very severe obstructive sleep apnea  He is symptomatic with frequent disruptions in sleep as well as daytime sleepiness  I showed him the raw data on his test and we discussed rationale for treatment  He is open to use of CPAP at home  Given the severity of the findings on his last sleep study, I would like him to have an urgent CPAP titration  If this cannot be performed in the very near future, I will order auto-CPAP to not delay his treatment  We reviewed the great importance of weight loss, with significant weight loss sleep apnea can improve or resolve  We also discussed the importance of avoiding drowsy driving  He notes this is not a problem for him at present, it was a greater issue in the past when he had a long drive for work  He has somewhat frequent episodes of epistaxis, including a recent episode where he woke up in his sleep with a nosebleed  He needs a nasal cauterization when he was younger  I would like him to see otolaryngology for an urgent assessment  This to be important ideally prior to use of CPAP at home  We discussed that CPAP should not be used with a nosebleed due to risk of aspiration of blood  Given recent worsening nosebleeds, I have ordered a CBC and INR although I do not have high suspicion they will yield any abnormalities  Subjective:      Patient ID: Veronica Marin is a 33 y o  male  HPI    This is a 67-year-old male who presents as a new patient with a chief complaint of trouble sleeping on his back, never feeling rested, waking with headaches, and frequent awakenings during the night    He was referred after "a recent abnormal polysomnogram which showed severe obstructive sleep apnea,   The patient had previously not had a sleep study  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[employer], on an 8 AM-4 PM schedule  He does not have a CDL  He typically goes to bed at 10 PM, is asleep by 1230, has 4-6 awakenings during the night, and is awake at 7 AM   He awakens with a \"feeling of breathlessness, general restlessness, and mind racing\"  He finds it hard to fall asleep, finds his mind races  Sometimes hard to fall back asleep with waking     He feels sleepy during the day, most notably in the late morning/early afternoon  He takes a nap once a week  He denies drowsy driving  He has loud snoring, choking, gasping, and witnessed breathing pauses  These are worsening  He finds his breathing is much worse on his back  As a result he sleeps prone where he finds he breathes better  Breathing worse with weight gain    He denies restless legs, sleepwalking, or dream enactment  He drinks 32 ounces of soda a day  He does not drink alcohol  He does not smoke cigarettes  [place] Sleepiness Scale  Sitting and reading: High chance of dozing  Watching TV: Slight chance of dozing  Sitting, inactive in a public place (e g  a theatre or a meeting): Moderate chance of dozing  As a passenger in a car for an hour without a break: Slight chance of dozing  Lying down to rest in the afternoon when circumstances permit: Moderate chance of dozing  Sitting and talking to someone: Would never doze  Sitting quietly after a lunch without alcohol: Moderate chance of dozing  In a car, while stopped for a few minutes in traffic: Would never doze  Total score: 11      Review of Systems   Constitutional: Positive for fatigue  HENT: Positive for congestion and nosebleeds (\" a lot\", saw ENT years ago)  Respiratory: Positive for shortness of breath (when waking)      Cardiovascular: Positive for chest pain (tightness when " "sleeping on back ) and palpitations (occ when sleeping supine, has heart racing)  Negative for leg swelling  Musculoskeletal: Positive for arthralgias (R shoulder) and back pain (better than before)  Negative for neck pain  Allergic/Immunologic: Positive for environmental allergies  Neurological: Positive for headaches (when sleeping supine)  Psychiatric/Behavioral: Positive for decreased concentration  Negative for dysphoric mood  The patient is not nervous/anxious  Objective:      /77 (BP Location: Left arm, Patient Position: Sitting, Cuff Size: Standard)   Pulse 88   Ht 6' 1\" (1 854 m)   Wt (!) 192 kg (423 lb 3 2 oz)   BMI 55 83 kg/m²          Physical Exam  Constitutional:       Appearance: Normal appearance  HENT:      Head: Normocephalic and atraumatic  Mouth/Throat:      Mouth: Mucous membranes are moist    Eyes:      Extraocular Movements: Extraocular movements intact  Pupils: Pupils are equal, round, and reactive to light  Cardiovascular:      Rate and Rhythm: Normal rate  Pulses: Normal pulses  Heart sounds: Normal heart sounds  Pulmonary:      Effort: Pulmonary effort is normal       Breath sounds: Normal breath sounds  Musculoskeletal:      Right lower leg: No edema  Left lower leg: No edema  Neurological:      Mental Status: He is alert  Psychiatric:         Mood and Affect: Mood normal          Behavior: Behavior normal          Thought Content:  Thought content normal          Judgment: Judgment normal          mallampati 4 airway, no tonsillar hypertrophy   20 5 in neck circumference    Data review-reviewed serum carbon dioxide level which was normal   Had a normal CBC in the past  "

## 2023-04-05 ENCOUNTER — EVALUATION (OUTPATIENT)
Dept: PHYSICAL THERAPY | Age: 28
End: 2023-04-05

## 2023-04-05 DIAGNOSIS — M54.12 CERVICAL RADICULOPATHY: Primary | ICD-10-CM

## 2023-04-05 NOTE — PROGRESS NOTES
PT Re-Evaluation     Today's date: 2023  Patient name: Negra Moyer  : 1995  MRN: [id number]  Referring provider: Chelo Tirado  Dx:   Encounter Diagnosis     ICD-10-CM    1  Cervical radiculopathy  M54 12                      Assessment  Assessment details: Pt reports to PT with cc of intermittent R UE N/T with ADLs  Pt has decreased cervical ROM and + nerve tension tests  Pt's symptoms were unaffected by repeated motion of cervical spine  Pt will trial cervical retraction and nerve glides and will be re-assessed next week  Impairments: abnormal coordination, abnormal muscle tone, abnormal or restricted ROM, impaired physical strength, lacks appropriate home exercise program and pain with function    Goals  In 4 weeks pt will:  -Be independent with phase I of HEP  -Increase UE strength by 1/2 grade  -Increase Cervical ROM by 25%    By discharge pt will:  -Be independent with Phase II of HEP  -Demonstrate full UE strength  -Demonstrate full Cervical ROM  -Report minimal pain with ADLs    Plan  Patient would benefit from: skilled physical therapy  Planned therapy interventions: joint mobilization, manual therapy, neuromuscular re-education, therapeutic activities, therapeutic exercise, strengthening, stretching and home exercise program  Frequency: 1x week  Duration in weeks: 6  Plan of Care beginning date: 2023  Plan of Care expiration date: 2023  Treatment plan discussed with: patient and PTA        Subjective    Objective     Active Range of Motion     Additional Active Range of Motion Details  Cervical ROM as % of normal ROM    Flex: 75  Ext:75  R ROT:75  L ROT:75      Strength/Myotome Testing   Cervical Spine     Left   Normal strength    Right Shoulder     Planes of Motion   Flexion: 4   Abduction: 4     Right Elbow   Flexion: 5  Extension: 5    Tests     Right Shoulder   Positive ULTT1 and ULTT4                Precautions: N/A      Manuals "                              Neuro Re-Ed                                                                                                        Ther Ex             Arm bike 5/5            Chin tuck 3\" 4x10            Median nerve glides HEP                                                                             Ther Activity                                       Gait Training                                       Modalities                                          "

## 2023-04-23 ENCOUNTER — HOSPITAL ENCOUNTER (OUTPATIENT)
Dept: SLEEP CENTER | Facility: CLINIC | Age: 28
Discharge: HOME | End: 2023-04-23

## 2023-04-23 DIAGNOSIS — G47.33 OSA (OBSTRUCTIVE SLEEP APNEA): ICD-10-CM

## 2023-04-24 NOTE — PROGRESS NOTES
Sleep Study Documentation    Pre-Sleep Study       Sleep testing procedure explained to patient:YES    Patient napped prior to study:NO    Caffeine:Dayshift worker after 12PM   Caffeine use:YES- coffee  6 ounces    Alcohol:Dayshift workers after 5PM: Alcohol use:NO    Typical day for patient:YES       Study Documentation    Sleep Study Indications: MYRIAM, snoring, BMI 55 41, daytime somnolence, shouler pain,     Sleep Study: Treatment   Optimal PAP pressure: 17cm  Leak:Small  Snore:Eliminated  REM Obtained:yes  Supplemental O2: no    Minimum SaO2 88%  Baseline SaO2 97%  PAP mask tried (list all) medium resmed airfit F20  PAP mask choice (final) medium resmed airfit F20  PAP mask type:full face  PAP pressure at which snoring was eliminated 15cm  Minimum SaO2 at final PAP pressure 93%  Mode of Therapy:CPAP  ETCO2:No  CPAP changed to BiPAP:No    Mode of Therapy:CPAP    EKG abnormalities: no     EEG abnormalities: no    Sleep Study Recorded < 2 hours: N/A    Sleep Study Recorded > 2 hours but incomplete study: N/A    Sleep Study Recorded 6 hours but no sleep obtained: NO    Patient classification: employed       Post-Sleep Study    Medication used at bedtime or during sleep study:NO    Patient reports time it took to fall asleep:20 to 30 minutes    Patient reports waking up during study:3 or more times  Patient reports returning to sleep in 10 to 30 minutes  Patient reports sleeping 4 to 6 hours without dreaming  Patient reports sleep during study:better than usual    Patient rated sleepiness: Not sleepy or tired    PAP treatment:yes: Post PAP treatment patient reports feeling better and  would wear PAP mask at home

## 2023-04-26 ENCOUNTER — OFFICE VISIT (OUTPATIENT)
Dept: PHYSICAL THERAPY | Age: 28
End: 2023-04-26

## 2023-04-26 DIAGNOSIS — M75.41 IMPINGEMENT SYNDROME OF RIGHT SHOULDER: ICD-10-CM

## 2023-04-26 DIAGNOSIS — M54.12 CERVICAL RADICULOPATHY: Primary | ICD-10-CM

## 2023-04-26 DIAGNOSIS — G47.33 OSA (OBSTRUCTIVE SLEEP APNEA): Primary | ICD-10-CM

## 2023-04-26 DIAGNOSIS — M25.311 SHOULDER INSTABILITY, RIGHT: ICD-10-CM

## 2023-04-26 NOTE — PROGRESS NOTES
"Daily Note     Today's date: 2023  Patient name: Quinn Mcneil  : 1995  MRN: [id number]  Referring provider: Nan Quintana  Dx:   Encounter Diagnosis     ICD-10-CM    1  Cervical radiculopathy  M54 12       2  Shoulder instability, right  M25 311       3  Impingement syndrome of right shoulder  M75 41                      Subjective: Pt reports he has done light activity recently and R shoulder pain persists but he has has less paraesthesias in RUE  Objective: See treatment diary below    Assessment: Decreased presence of R UE paresthesia symptoms recently  Tolerated treatment well  Patient would benefit from continued PT if consistent progress towards therapeutic goals can be achieved  Plan: Continue per plan of care  Progress treatment as tolerated         Precautions: N/A      Manuals          Cervical extension w/ chin tuck 3x10            Distraction, SOR   CALDERON                                    Neuro Re-Ed                                                                                                        Ther Ex             Arm bike          Chin tuck 3\" 4x10 3\" 2x10           Median nerve glides HEP                                                                             Ther Activity                                       Gait Training                                       Modalities             Cervical traction  15#/5# 28#/5# 30#, 5# 15'                            "

## 2023-05-01 ENCOUNTER — TELEPHONE (OUTPATIENT)
Dept: SLEEP CENTER | Facility: CLINIC | Age: 28
End: 2023-05-01

## 2023-05-01 LAB
DME PARACHUTE DELIVERY DATE REQUESTED: NORMAL
DME PARACHUTE DELIVERY NOTE: NORMAL
DME PARACHUTE ITEM DESCRIPTION: NORMAL
DME PARACHUTE ORDER STATUS: NORMAL
DME PARACHUTE SUPPLIER NAME: NORMAL
DME PARACHUTE SUPPLIER PHONE: NORMAL

## 2023-05-01 NOTE — TELEPHONE ENCOUNTER
Called patient and advised sleep study resulted and CPAP ordered  Scheduled DME set up 5/16/23 in [place]  Rx for CPAP and clinical information sent to [facility] via [employer]  Already has compliance follow up scheduled 7/11/23

## 2023-05-01 NOTE — TELEPHONE ENCOUNTER
----- Message from Mimi Mcclendon MD sent at 4/26/2023  1:33 PM EDT -----  CPAP worked very well on test, machine ordered

## 2023-05-03 ENCOUNTER — OFFICE VISIT (OUTPATIENT)
Dept: PHYSICAL THERAPY | Age: 28
End: 2023-05-03

## 2023-05-03 DIAGNOSIS — M75.41 IMPINGEMENT SYNDROME OF RIGHT SHOULDER: ICD-10-CM

## 2023-05-03 DIAGNOSIS — M25.311 SHOULDER INSTABILITY, RIGHT: ICD-10-CM

## 2023-05-03 DIAGNOSIS — M54.12 CERVICAL RADICULOPATHY: Primary | ICD-10-CM

## 2023-05-03 NOTE — PROGRESS NOTES
"Daily Note     Today's date: 5/3/2023  Patient name: Janay Davidson  : 1995  MRN: [id number]  Referring provider: Marquise Lamar  Dx:   Encounter Diagnosis     ICD-10-CM    1  Cervical radiculopathy  M54 12       2  Shoulder instability, right  M25 311       3  Impingement syndrome of right shoulder  M75 41                      Subjective: Pt reports UE paresthesias persist with activities above shoulder height  Objective: See treatment diary below    Assessment: Minimal longstanding relief of UE paresthesia symptoms with therapeutic interventions thus far- pt has follow up with referring physician on   Tolerated treatment well  Patient would benefit from continued PT if consistent progress towards therapeutic goals can be achieved  Plan: Pt to follow up with MD on - will give updated status following that appointment        Precautions: N/A      Manuals 4/12  4/19 4/26 5/3        Cervical extension w/ chin tuck 3x10            Distraction, SOR   CALDERON                                    Neuro Re-Ed                                                                                                        Ther Ex             Arm bike         Chin tuck 3\" 4x10 3\" 2x10   Chin Tuck w Extension 20x        Median nerve glides HEP                                                                             Ther Activity                                       Gait Training                                       Modalities             Cervical traction  15#/5# 28#/5# 30#, 5# 15'  30#, 7# 13'                           "

## 2023-05-03 NOTE — PROGRESS NOTES
Assessment:  1  Cervical radiculopathy        Plan:  1  I will order an MRI of the cervical spine without contrast  2  Patient will continue with exercise program as taught by physical therapy  3  Patient will follow-up after imaging or sooner if needed    History of Present Illness: The patient is a 33 y o  male last seen on 03/24/2023 who presents for a follow up office visit in regards to chronic shoulder pain radiating into the right upper extremity to the first 3 digits of the right hand with associated numbness which began about 9 years ago when he was bench pressing and suffereda right shoulder injury  He denies any significant neck pain  EMG of the right upper extremity was normal   MRI arthrogram of the right shoulder was normal   Patient never completed x-ray of the cervical spine  Patient has been participating in physical therapy for complaints cervical and completed 6 weeks of conservative treatment without any improvement of his symptoms  He uses Tylenol and over-the-counter NSAIDs without much relief  He has tried gabapentin in the past for lumbar complaints but not for this condition  His pain a 7 out of 10 on the numeric pain rating scale  He constantly has pain in the morning which is described as dull aching, sharp, shooting and numbness    I have personally reviewed and/or updated the patient's past medical history, past surgical history, family history, social history, current medications, allergies, and vital signs today  Review of Systems:    Review of Systems   Respiratory: Negative for shortness of breath  Cardiovascular: Negative for chest pain  Gastrointestinal: Negative for constipation, diarrhea, nausea and vomiting  Musculoskeletal: Negative for arthralgias, gait problem, joint swelling and myalgias  Skin: Negative for rash  Neurological: Negative for dizziness, seizures and weakness  All other systems reviewed and are negative          Past Medical "History:   Diagnosis Date    Back pain     Testicle pain        Past Surgical History:   Procedure Laterality Date    FL INJECTION RIGHT SHOULDER (ARTHROGRAM)  2/6/2023       Family History   Problem Relation Age of Onset    Diabetes Mother     Cancer Mother     Heart attack Mother     Snoring Father         suspected MYRIAM    Stroke Father     Cancer Father        Social History     Occupational History    Not on file   Tobacco Use    Smoking status: Never    Smokeless tobacco: Never   Vaping Use    Vaping Use: Never used   Substance and Sexual Activity    Alcohol use: Yes     Comment: socially    Drug use: No    Sexual activity: Not on file         Current Outpatient Medications:     diazepam (VALIUM) 5 mg tablet, Take 1 tablet (5 mg total) by mouth 2 (two) times a day for 2 days, Disp: 5 tablet, Rfl: 0    methocarbamol (ROBAXIN) 500 mg tablet, Take 2 tablets (1,000 mg total) by mouth 2 (two) times a day for 7 days, Disp: 28 tablet, Rfl: 0    methylPREDNISolone 4 MG tablet therapy pack, Use as directed on package, Disp: 21 each, Rfl: 0    methylPREDNISolone 4 MG tablet therapy pack, Use as directed on package, Disp: 1 each, Rfl: 0    Allergies   Allergen Reactions    Shellfish Allergy - Food Allergy Anaphylaxis    Other      Seasonal  pollen       Physical Exam:    /72   Pulse 93   Ht 6' 1\" (1 854 m)   Wt (!) 190 kg (418 lb)   BMI 55 15 kg/m²     Constitutional:normal, well developed, well nourished, alert, in no distress and non-toxic and no overt pain behavior  Eyes:anicteric  HEENT:grossly intact  Neck:supple, symmetric, trachea midline and no masses   Pulmonary:even and unlabored  Cardiovascular:No edema or pitting edema present  Skin:Normal without rashes or lesions and well hydrated  Psychiatric:Mood and affect appropriate  Neurologic:Cranial Nerves II-XII grossly intact  Musculoskeletal:normal gait         Imaging  MRI cervical spine without contrast    (Results Pending) " Orders Placed This Encounter   Procedures    MRI cervical spine without contrast

## 2023-05-05 ENCOUNTER — OFFICE VISIT (OUTPATIENT)
Dept: PAIN MEDICINE | Facility: CLINIC | Age: 28
End: 2023-05-05

## 2023-05-05 VITALS
SYSTOLIC BLOOD PRESSURE: 130 MMHG | HEIGHT: 73 IN | BODY MASS INDEX: 41.75 KG/M2 | DIASTOLIC BLOOD PRESSURE: 72 MMHG | WEIGHT: 315 LBS | HEART RATE: 93 BPM

## 2023-05-05 DIAGNOSIS — M54.12 CERVICAL RADICULOPATHY: Primary | ICD-10-CM

## 2023-05-10 ENCOUNTER — OFFICE VISIT (OUTPATIENT)
Dept: PHYSICAL THERAPY | Age: 28
End: 2023-05-10

## 2023-05-10 DIAGNOSIS — M25.311 SHOULDER INSTABILITY, RIGHT: ICD-10-CM

## 2023-05-10 DIAGNOSIS — M75.41 IMPINGEMENT SYNDROME OF RIGHT SHOULDER: ICD-10-CM

## 2023-05-10 DIAGNOSIS — M54.12 CERVICAL RADICULOPATHY: Primary | ICD-10-CM

## 2023-05-10 NOTE — PROGRESS NOTES
"Daily Note     Today's date: 5/10/2023  Patient name: Mani Head  : 1995  MRN: [id number]  Referring provider: Omi Solis  Dx:   Encounter Diagnosis     ICD-10-CM    1  Cervical radiculopathy  M54 12       2  Shoulder instability, right  M25 311       3  Impingement syndrome of right shoulder  M75 41                      Subjective: Pt denies RUE hand paresthesia symptoms throughout the day  Objective: See treatment diary below    Assessment: Tolerated treatment well  Patient scheduled for MRI of cervcial spine due to ongoing symptoms  Plan: Further PT interventions TBD following results of MRI        Precautions: N/A      Manuals 4/12  4/19 4/26 5/3 5/10       Cervical extension w/ chin tuck 3x10            Distraction, SOR   CALDERON                                    Neuro Re-Ed                                                                                                        Ther Ex             Arm bike        Chin tuck 3\" 4x10 3\" 2x10   Chin Tuck w Extension 20x Chin Tuck w Extension 20x       Median nerve glides HEP                                                                             Ther Activity                                       Gait Training                                       Modalities             Cervical traction  15#/5# 28#/5# 30#, 5# 15'  30#, 7# 15'  30#, 7# 13'                          "

## 2023-05-16 LAB
DME PARACHUTE DELIVERY DATE ACTUAL: NORMAL
DME PARACHUTE DELIVERY DATE EXPECTED: NORMAL
DME PARACHUTE DELIVERY DATE REQUESTED: NORMAL
DME PARACHUTE DELIVERY NOTE: NORMAL
DME PARACHUTE ITEM DESCRIPTION: NORMAL
DME PARACHUTE ORDER STATUS: NORMAL
DME PARACHUTE SUPPLIER NAME: NORMAL
DME PARACHUTE SUPPLIER PHONE: NORMAL

## 2023-05-18 ENCOUNTER — TELEPHONE (OUTPATIENT)
Dept: SLEEP CENTER | Facility: CLINIC | Age: 28
End: 2023-05-18

## 2023-05-18 NOTE — TELEPHONE ENCOUNTER
Pt was set up on 5/16 by Mike Wright at [facility] office on [address] [facility] S11 set at 13-17cm and mask F30i W

## 2023-05-29 ENCOUNTER — HOSPITAL ENCOUNTER (OUTPATIENT)
Dept: RADIOLOGY | Facility: HOSPITAL | Age: 28
Discharge: HOME | End: 2023-05-29

## 2023-05-29 DIAGNOSIS — M54.12 CERVICAL RADICULOPATHY: ICD-10-CM

## 2023-06-05 ENCOUNTER — TELEPHONE (OUTPATIENT)
Dept: PAIN MEDICINE | Facility: CLINIC | Age: 28
End: 2023-06-05

## 2023-06-05 NOTE — TELEPHONE ENCOUNTER
Caller: patient     Doctor: Lyndy Schirmer, CRNP    Reason for call: pt transferred to nurse    Call back#: n/a

## 2023-06-05 NOTE — TELEPHONE ENCOUNTER
S/W pt  Advised pt of the same  Offered to schedule SOVS and pt declined  He will call back  Pt verbalized understanding

## 2023-07-11 ENCOUNTER — OFFICE VISIT (OUTPATIENT)
Dept: URGENT CARE | Facility: CLINIC | Age: 28
End: 2023-07-11
Payer: COMMERCIAL

## 2023-07-11 ENCOUNTER — OFFICE VISIT (OUTPATIENT)
Dept: SLEEP CENTER | Facility: CLINIC | Age: 28
End: 2023-07-11
Payer: COMMERCIAL

## 2023-07-11 VITALS
HEART RATE: 78 BPM | OXYGEN SATURATION: 97 % | TEMPERATURE: 96 F | SYSTOLIC BLOOD PRESSURE: 128 MMHG | DIASTOLIC BLOOD PRESSURE: 82 MMHG | RESPIRATION RATE: 20 BRPM

## 2023-07-11 VITALS
HEART RATE: 83 BPM | DIASTOLIC BLOOD PRESSURE: 88 MMHG | SYSTOLIC BLOOD PRESSURE: 134 MMHG | BODY MASS INDEX: 41.75 KG/M2 | HEIGHT: 73 IN | WEIGHT: 315 LBS

## 2023-07-11 DIAGNOSIS — G47.33 OSA (OBSTRUCTIVE SLEEP APNEA): Primary | ICD-10-CM

## 2023-07-11 DIAGNOSIS — E66.1 CLASS 3 DRUG-INDUCED OBESITY WITH SERIOUS COMORBIDITY AND BODY MASS INDEX (BMI) OF 50.0 TO 59.9 IN ADULT (HCC): ICD-10-CM

## 2023-07-11 DIAGNOSIS — B00.2 HERPES STOMATITIS: Primary | ICD-10-CM

## 2023-07-11 PROCEDURE — 99213 OFFICE O/P EST LOW 20 MIN: CPT | Performed by: PSYCHIATRY & NEUROLOGY

## 2023-07-11 PROCEDURE — 99212 OFFICE O/P EST SF 10 MIN: CPT | Performed by: PHYSICIAN ASSISTANT

## 2023-07-11 RX ORDER — FAMCICLOVIR 500 MG/1
500 TABLET ORAL 3 TIMES DAILY
Qty: 15 TABLET | Refills: 0 | Status: SHIPPED | OUTPATIENT
Start: 2023-07-11 | End: 2023-07-16

## 2023-07-11 NOTE — PATIENT INSTRUCTIONS
Gingivostomatitis   AMBULATORY CARE:   Gingivostomatitis (GS)  is a condition that causes painful sores on the lips, tongue, gums, and inside the mouth. GS is caused by the herpes simplex virus. The virus spreads easily from person to person through saliva or shared objects. The sores usually heal within 2 weeks with treatment. Common signs and symptoms of GS:   Sores, ulcers, or blisters in your mouth    Irritated gums    Sore throat    Fever, headache, fatigue    Nausea, vomiting, or swollen lymph glands    Seek care immediately if:   You have severe pain. Contact your healthcare provider if:   Your fever or other symptoms return after treatment. You are urinating less than usual.    Your mouth sores are draining pus or blood. You have questions or concerns about your condition or care. Treatment  may include any of the following:  Acetaminophen  decreases pain and fever. It is available without a doctor's order. Ask how much to take and how often to take it. Follow directions. Acetaminophen can cause liver damage if not taken correctly. NSAIDs , such as ibuprofen, help decrease swelling, pain, and fever. This medicine is available with or without a doctor's order. NSAIDs can cause stomach bleeding or kidney problems in certain people. If you take blood thinner medicine, always ask your healthcare provider if NSAIDs are safe for you. Always read the medicine label and follow directions. Numbing medicine  helps decrease pain from your mouth sores. This medicine is usually a liquid that you swish in your mouth and then spit out. Antiviral medicine  helps treat a viral infection. Manage your symptoms:   Brush your teeth at least 2 times each day. Floss at least 1 time each day. If you wear dentures, make sure they fit properly. Drink liquids as directed to prevent dehydration. It is important to drink liquids even though your mouth is sore.  Ask how much liquid to drink each day and which liquids are best for you. Eat a variety of healthy foods. You may need to eat bland foods until your pain gets better. Healthy foods include fruits, vegetables, whole-grain breads, low-fat dairy products, beans, lean meats, and fish. Do not eat spicy, dry, hard, or acidic foods, such as oranges. Do not smoke. Nicotine and other chemicals in cigarettes and cigars can cause mouth and lung damage. Ask your healthcare provider for information if you currently smoke and need help to quit. E-cigarettes or smokeless tobacco still contain nicotine. Talk to your healthcare provider before you use these products. Follow up with your doctor as directed:  Write down your questions so you remember to ask them during your visits. © Copyright Safia Bowden 2022 Information is for End User's use only and may not be sold, redistributed or otherwise used for commercial purposes. The above information is an  only. It is not intended as medical advice for individual conditions or treatments. Talk to your doctor, nurse or pharmacist before following any medical regimen to see if it is safe and effective for you.

## 2023-07-11 NOTE — PROGRESS NOTES
[facility]    NAME: Viviana Mike is a 33 y.o. male  : 1995    MRN: [id number]  DATE: 2023  TIME: 9:47 AM    Assessment and Plan   Herpes stomatitis [B00.2]  1. Herpes stomatitis  famciclovir (FAMVIR) 500 mg tablet          Patient Instructions   Patient Instructions   Gingivostomatitis   AMBULATORY CARE:   Gingivostomatitis (GS)  is a condition that causes painful sores on the lips, tongue, gums, and inside the mouth. GS is caused by the herpes simplex virus. The virus spreads easily from person to person through saliva or shared objects. The sores usually heal within 2 weeks with treatment. Common signs and symptoms of GS:   • Sores, ulcers, or blisters in your mouth    • Irritated gums    • Sore throat    • Fever, headache, fatigue    • Nausea, vomiting, or swollen lymph glands    Seek care immediately if:   • You have severe pain. Contact your healthcare provider if:   • Your fever or other symptoms return after treatment. • You are urinating less than usual.    • Your mouth sores are draining pus or blood. • You have questions or concerns about your condition or care. Treatment  may include any of the following:  • Acetaminophen  decreases pain and fever. It is available without a doctor's order. Ask how much to take and how often to take it. Follow directions. Acetaminophen can cause liver damage if not taken correctly. • NSAIDs , such as ibuprofen, help decrease swelling, pain, and fever. This medicine is available with or without a doctor's order. NSAIDs can cause stomach bleeding or kidney problems in certain people. If you take blood thinner medicine, always ask your healthcare provider if NSAIDs are safe for you. Always read the medicine label and follow directions. • Numbing medicine  helps decrease pain from your mouth sores. This medicine is usually a liquid that you swish in your mouth and then spit out.      • Antiviral medicine  helps treat a viral infection. Manage your symptoms:   • Brush your teeth at least 2 times each day. Floss at least 1 time each day. If you wear dentures, make sure they fit properly. • Drink liquids as directed to prevent dehydration. It is important to drink liquids even though your mouth is sore. Ask how much liquid to drink each day and which liquids are best for you. • Eat a variety of healthy foods. You may need to eat bland foods until your pain gets better. Healthy foods include fruits, vegetables, whole-grain breads, low-fat dairy products, beans, lean meats, and fish. Do not eat spicy, dry, hard, or acidic foods, such as oranges. • Do not smoke. Nicotine and other chemicals in cigarettes and cigars can cause mouth and lung damage. Ask your healthcare provider for information if you currently smoke and need help to quit. E-cigarettes or smokeless tobacco still contain nicotine. Talk to your healthcare provider before you use these products. Follow up with your doctor as directed:  Write down your questions so you remember to ask them during your visits. © Copyright Geovanna Gallagher 2022 Information is for End User's use only and may not be sold, redistributed or otherwise used for commercial purposes. The above information is an  only. It is not intended as medical advice for individual conditions or treatments. Talk to your doctor, nurse or pharmacist before following any medical regimen to see if it is safe and effective for you. Chief Complaint     Chief Complaint   Patient presents with   • Mouth Lesions     Pt C/O lower lip blister that started Friday, appeared to be healing and noted additional blisters forming yesterday. History of Present Illness   Annette Martinez presents to the clinic c/o  42-year-old male comes in with painful lesions on his lips mouth. Started: Friday noted lower lip blister.   That has been healing but now new lesions have been popping up.  Associated signs and symptoms: No fever or chills. Modifying factors: Nothing. Known Exposures: No known exposures. Says he has had these recurrent since he was a little kid. Has not had it for quite a while. Back in 2018 he said he got so bad that he ended up going to the [facility] emergency room where he received treatment. Review of Systems   Review of Systems   Constitutional: Negative. Skin: Positive for rash. Current Medications     Long-Term Medications   Medication Sig Dispense Refill   • famciclovir (FAMVIR) 500 mg tablet Take 1 tablet (500 mg total) by mouth 3 (three) times a day for 5 days 15 tablet 0       Current Allergies     Allergies as of 07/11/2023 - Reviewed 07/11/2023   Allergen Reaction Noted   • Shellfish allergy - food allergy Anaphylaxis 09/13/2017   • Other  06/19/2020          The following portions of the patient's history were reviewed and updated as appropriate: allergies, current medications, past family history, past medical history, past social history, past surgical history and problem list.  Past Medical History:   Diagnosis Date   • Back pain    • Testicle pain      Past Surgical History:   Procedure Laterality Date   • FL INJECTION RIGHT SHOULDER (ARTHROGRAM)  2/6/2023     Family History   Problem Relation Age of Onset   • Diabetes Mother    • Cancer Mother    • Heart attack Mother    • Snoring Father         suspected MYRIAM   • Stroke Father    • Cancer Father        Objective   /82 (BP Location: Right arm, Patient Position: Sitting, Cuff Size: Large)   Pulse 78   Temp (!) 96 °F (35.6 °C) (Tympanic)   Resp 20   SpO2 97%   No LMP for male patient. Physical Exam     Physical Exam  Vitals and nursing note reviewed. Constitutional:       General: He is not in acute distress. Appearance: He is well-developed. He is obese. He is not ill-appearing, toxic-appearing or diaphoretic.    HENT:      Mouth/Throat:      Lips: Lesions present. Mouth: Mucous membranes are moist. No oral lesions. Tongue: No lesions. Pharynx: Oropharynx is clear. Uvula midline. No oropharyngeal exudate, posterior oropharyngeal erythema or uvula swelling. Tonsils: No tonsillar exudate. Comments: Vesicular lesions noted lower lip with largest mid aspect and 2-3 vesicular lesions noted left lateral aspect. 1 lesion noted on border of lip and mucosal surface. Initial lesion 0.4 cm. Other lesions 0.1 to 0.3 cm in size  Cardiovascular:      Rate and Rhythm: Normal rate and regular rhythm. Pulmonary:      Effort: Pulmonary effort is normal.   Skin:     General: Skin is warm and dry. Findings: Lesion present. Neurological:      Mental Status: He is alert and oriented to person, place, and time.    Psychiatric:         Mood and Affect: Mood normal.         Behavior: Behavior normal.

## 2023-07-11 NOTE — PATIENT INSTRUCTIONS
You are doing great with use of your machine. .  On your prior diagnostic sleep study you had 106 times per hour of sleep apnea events. With your machine, you have 1 times per hour of sleep apnea episodes. The goal is to use CPAP all night long, more usage= more health benefit and more symptomatic improvement. If any barriers or problems arise that lead to difficulty using your machine, please let me know either through a [employer] message or alternatively, by calling my office. It is very important to avoid driving while drowsy, this can be very dangerous or even cause serious injury or death. If sleepy, it is not safe to get behind the wheel. If you are driving and feels sleepy, it is very important to pull over right away. Even losing control of the car for a split second can be deadly. If you feel you cannot control when sleepiness occurs and cannot prevented, it is important to not drive at all until this improves. Please let me know if you experience this as it is very important. Nursing Support:  When: Monday through Friday 7A-5PM except holidays  Where: Our direct line is [phone]. If you are having a true emergency please call 911. In the event that the line is busy or it is after hours please leave a voice message and we will return your call. Please speak clearly, leaving your full name, birth date, best number to reach you and the reason for your call. Medication refills: We will need the name of the medication, the dosage, the ordering provider, whether you get a 30 or 90 day refill, and the pharmacy name and address. Medications will be ordered by the provider only. Nurses cannot call in prescriptions. Please allow 7 days for medication refills. Physician requested updates:  If your provider requested that you call with an update after starting medication, please be ready to provide us the medication and dosage, what time you take your medication, the time you attempt to fall asleep, time you fall asleep, when you wake up, and what time you get out of bed. Sleep Study Results: We will contact you with sleep study results and/or next steps after the physician has reviewed your testing.

## 2023-07-11 NOTE — PROGRESS NOTES
Assessment/Plan:    1. MYRIAM (obstructive sleep apnea)  -     Ambulatory Referral to Weight Management; Future    2. Class 3 drug-induced obesity with serious comorbidity and body mass index (BMI) of 50.0 to 59.9 in adult [facility])  -     Ambulatory Referral to Weight Management; Future      Mr. Juan Glass is doing great, he is consistently using his CPAP machine and treatment is beneficial and effective. No change is needed in his current settings. We discussed that he does not have a long session length but that is unfortunately related to his limited opportunity to sleep. We also reviewed the importance of weight loss, he is very open to a referral to weight management, this was placed today. I will see him back in 6 months    Subjective:      Patient ID: Serene Arvizu is a 33 y.o. male. HPI    This is a 71-year-old male who returns in a follow-up for obstructive sleep apnea treated with CPAP. I initially had assessed him in April 2023, he had presented with a chief complaint of not feeling rested, awakening with headaches, and frequent awakenings during the night. He had a polysomnogram that showed severe obstructive sleep apnea, . At his consult, I ordered a CPAP titration study. He also provided a history of epistaxis, I referred him to otolaryngology for their assessment. He saw Rubén Regalado MD from ENT in April, in office nasal cautery was performed. The patient had a CPAP titration study in April which showed effective treatment at the CPAP setting of 17 cm H2O which resulted in an AHI of 0, similar results were seen at 13 cm H2O. This is his first follow-up since starting CPAP at home. CPAP data reviewed today  AirSense 11 AutoSet at 13-17 cm H2O  Average session 4 hours 40 minutes, usage 27 out of 30 days, 63% more than 4 hours  AHI 0.7  95% pressure-14.4 cm H2O  95% mask leak is high  Uses f30i    Goes to bed 11 pm , falls asleep easily. Often sleeps through the night.  Wakes 5- 6 AM alarm. When not working wakes 7-8 AM when his 20 month old daughter wakes him up     Not often sleepy in the day, mostly if he does not sleep well. No drowsy driving. Rarely naps    Occ dry mouth- no nose bleeds. No nasal congesiton. No post-nasal drip. Had some aerophagia, that resolved    Caffeine- 8 oz coffee twice daily  Alcohol- none     [place] Sleepiness Scale  Sitting and reading: Moderate chance of dozing  Watching TV: Slight chance of dozing  Sitting, inactive in a public place (e.g. a theatre or a meeting): Would never doze  As a passenger in a car for an hour without a break: Slight chance of dozing  Lying down to rest in the afternoon when circumstances permit: High chance of dozing  Sitting and talking to someone: Would never doze  Sitting quietly after a lunch without alcohol:  Moderate chance of dozing  In a car, while stopped for a few minutes in traffic: Would never doze  Total score: 9      Review of Systems      Objective:      /88 (BP Location: Left arm, Patient Position: Sitting, Cuff Size: Adult)   Pulse 83   Ht 6' 1" (1.854 m)   Wt (!) 192 kg (423 lb 3.2 oz)   BMI 55.83 kg/m²          Physical Exam

## 2023-07-13 ENCOUNTER — APPOINTMENT (EMERGENCY)
Dept: RADIOLOGY | Facility: HOSPITAL | Age: 28
End: 2023-07-13
Payer: COMMERCIAL

## 2023-07-13 ENCOUNTER — HOSPITAL ENCOUNTER (EMERGENCY)
Facility: HOSPITAL | Age: 28
Discharge: HOME | End: 2023-07-13
Attending: EMERGENCY MEDICINE
Payer: COMMERCIAL

## 2023-07-13 VITALS
DIASTOLIC BLOOD PRESSURE: 98 MMHG | SYSTOLIC BLOOD PRESSURE: 168 MMHG | HEART RATE: 98 BPM | OXYGEN SATURATION: 97 % | RESPIRATION RATE: 20 BRPM

## 2023-07-13 DIAGNOSIS — R10.9 ABDOMINAL PAIN: ICD-10-CM

## 2023-07-13 DIAGNOSIS — R10.9 LEFT FLANK PAIN: Primary | ICD-10-CM

## 2023-07-13 LAB
ALBUMIN SERPL BCP-MCNC: 3.9 G/DL (ref 3.5–5)
ALP SERPL-CCNC: 76 U/L (ref 46–116)
ALT SERPL W P-5'-P-CCNC: 118 U/L (ref 12–78)
ANION GAP SERPL CALCULATED.3IONS-SCNC: 6 MMOL/L
AST SERPL W P-5'-P-CCNC: 54 U/L (ref 5–45)
BACTERIA UR QL AUTO: ABNORMAL /HPF
BASOPHILS # BLD AUTO: 0.03 THOUSANDS/ÂΜL (ref 0–0.1)
BASOPHILS NFR BLD AUTO: 0 % (ref 0–1)
BILIRUB SERPL-MCNC: 0.32 MG/DL (ref 0.2–1)
BILIRUB UR QL STRIP: NEGATIVE
BUN SERPL-MCNC: 14 MG/DL (ref 5–25)
CALCIUM SERPL-MCNC: 9.3 MG/DL (ref 8.3–10.1)
CHLORIDE SERPL-SCNC: 108 MMOL/L (ref 96–108)
CLARITY UR: CLEAR
CO2 SERPL-SCNC: 22 MMOL/L (ref 21–32)
COLOR UR: ABNORMAL
CREAT SERPL-MCNC: 0.84 MG/DL (ref 0.6–1.3)
EOSINOPHIL # BLD AUTO: 0.22 THOUSAND/ÂΜL (ref 0–0.61)
EOSINOPHIL NFR BLD AUTO: 2 % (ref 0–6)
ERYTHROCYTE [DISTWIDTH] IN BLOOD BY AUTOMATED COUNT: 12.8 % (ref 11.6–15.1)
GFR SERPL CREATININE-BSD FRML MDRD: 120 ML/MIN/1.73SQ M
GLUCOSE SERPL-MCNC: 162 MG/DL (ref 65–140)
GLUCOSE UR STRIP-MCNC: NEGATIVE MG/DL
HCT VFR BLD AUTO: 45.2 % (ref 36.5–49.3)
HGB BLD-MCNC: 15 G/DL (ref 12–17)
HGB UR QL STRIP.AUTO: NEGATIVE
IMM GRANULOCYTES # BLD AUTO: 0.05 THOUSAND/UL (ref 0–0.2)
IMM GRANULOCYTES NFR BLD AUTO: 1 % (ref 0–2)
KETONES UR STRIP-MCNC: NEGATIVE MG/DL
LEUKOCYTE ESTERASE UR QL STRIP: NEGATIVE
LIPASE SERPL-CCNC: 79 U/L (ref 73–393)
LYMPHOCYTES # BLD AUTO: 2.77 THOUSANDS/ÂΜL (ref 0.6–4.47)
LYMPHOCYTES NFR BLD AUTO: 29 % (ref 14–44)
MCH RBC QN AUTO: 29.5 PG (ref 26.8–34.3)
MCHC RBC AUTO-ENTMCNC: 33.2 G/DL (ref 31.4–37.4)
MCV RBC AUTO: 89 FL (ref 82–98)
MONOCYTES # BLD AUTO: 0.57 THOUSAND/ÂΜL (ref 0.17–1.22)
MONOCYTES NFR BLD AUTO: 6 % (ref 4–12)
MUCOUS THREADS UR QL AUTO: ABNORMAL
NEUTROPHILS # BLD AUTO: 5.87 THOUSANDS/ÂΜL (ref 1.85–7.62)
NEUTS SEG NFR BLD AUTO: 62 % (ref 43–75)
NITRITE UR QL STRIP: NEGATIVE
NON-SQ EPI CELLS URNS QL MICRO: ABNORMAL /HPF
NRBC BLD AUTO-RTO: 0 /100 WBCS
PH UR STRIP.AUTO: 7 [PH]
PLATELET # BLD AUTO: 236 THOUSANDS/UL (ref 149–390)
PMV BLD AUTO: 9.8 FL (ref 8.9–12.7)
POTASSIUM SERPL-SCNC: 4 MMOL/L (ref 3.5–5.3)
PROT SERPL-MCNC: 8.2 G/DL (ref 6.4–8.4)
PROT UR STRIP-MCNC: ABNORMAL MG/DL
RBC # BLD AUTO: 5.08 MILLION/UL (ref 3.88–5.62)
RBC #/AREA URNS AUTO: ABNORMAL /HPF
SODIUM SERPL-SCNC: 136 MMOL/L (ref 135–147)
SP GR UR STRIP.AUTO: 1.03 (ref 1–1.03)
UROBILINOGEN UR STRIP-ACNC: 2 MG/DL
WBC # BLD AUTO: 9.51 THOUSAND/UL (ref 4.31–10.16)
WBC #/AREA URNS AUTO: ABNORMAL /HPF

## 2023-07-13 PROCEDURE — 80053 COMPREHEN METABOLIC PANEL: CPT | Performed by: EMERGENCY MEDICINE

## 2023-07-13 PROCEDURE — 81001 URINALYSIS AUTO W/SCOPE: CPT

## 2023-07-13 PROCEDURE — 99284 EMERGENCY DEPT VISIT MOD MDM: CPT

## 2023-07-13 PROCEDURE — 36415 COLL VENOUS BLD VENIPUNCTURE: CPT

## 2023-07-13 PROCEDURE — 96361 HYDRATE IV INFUSION ADD-ON: CPT

## 2023-07-13 PROCEDURE — 83690 ASSAY OF LIPASE: CPT | Performed by: EMERGENCY MEDICINE

## 2023-07-13 PROCEDURE — G1004 CDSM NDSC: HCPCS

## 2023-07-13 PROCEDURE — 96360 HYDRATION IV INFUSION INIT: CPT

## 2023-07-13 PROCEDURE — 85025 COMPLETE CBC W/AUTO DIFF WBC: CPT | Performed by: EMERGENCY MEDICINE

## 2023-07-13 PROCEDURE — 99285 EMERGENCY DEPT VISIT HI MDM: CPT | Performed by: EMERGENCY MEDICINE

## 2023-07-13 PROCEDURE — 74177 CT ABD & PELVIS W/CONTRAST: CPT

## 2023-07-13 RX ORDER — MAGNESIUM HYDROXIDE/ALUMINUM HYDROXICE/SIMETHICONE 120; 1200; 1200 MG/30ML; MG/30ML; MG/30ML
30 SUSPENSION ORAL ONCE
Status: COMPLETED | OUTPATIENT
Start: 2023-07-13 | End: 2023-07-13

## 2023-07-13 RX ADMIN — SODIUM CHLORIDE 1000 ML: 0.9 INJECTION, SOLUTION INTRAVENOUS at 19:29

## 2023-07-13 RX ADMIN — ALUMINUM HYDROXIDE, MAGNESIUM HYDROXIDE, AND SIMETHICONE 30 ML: 200; 200; 20 SUSPENSION ORAL at 18:41

## 2023-07-13 RX ADMIN — IOHEXOL 100 ML: 350 INJECTION, SOLUTION INTRAVENOUS at 19:42

## 2023-07-13 NOTE — Clinical Note
David Pena was seen and treated in our emergency department on 7/13/2023. Diagnosis:     Mckenna Soto  may return to school on return date. He may return on this date: 07/15/2023         If you have any questions or concerns, please don't hesitate to call.       Warrick Sandhoff, DO    ______________________________           _______________          _______________  Hospital Representative                              Date                                Time

## 2023-07-13 NOTE — ED ATTENDING ATTESTATION
Final Diagnoses:     1. Left flank pain    2. Abdominal pain      ED Course as of 07/16/23 1928   Thu Jul 13, 2023   1830 AST(!): 54   1830 ALT(!): 118   1830 Minimal transaminitis. Mery Celis MD, saw and evaluated the patient. All available labs and X-rays were ordered by me or the resident / non-physician and have been reviewed by myself. I discussed the patient with the resident / non-physician and agree with the resident's / non-physician practitioner's findings and plan as documented in the resident's / non-physician practicitioner's note, except where noted. At this point, I agree with the current assessment done in the ED. I was present during key portions of all procedures performed unless otherwise stated. Nursing Triage:     Chief Complaint   Patient presents with   • Flank Pain     Pt has been having L flank pain for about 4 days. Noted mild diarrhea. Pain radiates into abdomen. HPI:   This is a 33 y.o. male presenting for evaluation of LEFT belly pain x4 days feeling like cramps. Doesn't relate to BMs. He points to the RUQ region as where it hurts somewhat after eating, which is when the pain is the worst.  It's more on the LEFT side though, going to the LLQ. Intermittent in nature, currently in minimal discomfort. Denies any urinary tract infection symptoms (burning, itching, pain, blood, frequency). He mentions he has a cold sore on the Left lower lip and started to take an antiviral 2 days ago, but the anti-viral started AFTER the onset of the pain. Denies any upper respiratory tract infection symptoms (cough, congestion, rhinorrhea, sore throat). No hx of similar. ASSESSMENT + PLAN:   - given the presentation, will check CBC for marked leukocytosis  - CMP for liver enzyme elevation that could signal cholecystitis, biliary obstructive disease.  Check RFTs for GURINDER / markers of dehydration.  - Lipase given abdominal pain to evaluate specifically for pancreatitis. - Urine: will check for UTI or signs of pyelonephritis. - Lastly, will consider abdominal imaging. Could be cholecystitis? Worse with food intake.   - CT AP w Contrast: r/o appendicitis, cholecystitis, bowel obstruction or other acute abdominal pathology. Would also demonstrate signs of pyelonephritis, cystitis. - Disposition per workup. Physical:   General: VS reviewed  Appears in NAD  Obese. awake, alert. Well-nourished, well-developed. Appears stated age. Speaking normally in full sentences. Head: Normocephalic, atraumatic  Eyes: EOM-I. No diplopia. No hyphema. No subconjunctival hemorrhages. Symmetrical lids. ENT: Atraumatic external nose and ears. MMM  No malocclusion. No stridor. Normal phonation. No drooling. Normal swallowing. Neck: No JVD. CV: No pallor noted  No tachycardia, HR 90s  Lungs:   No tachypnea  No respiratory distress  Abd: soft   RUQ tenderness moderate  LUQ/LLQ severe  No rebound/guarding  MSK:   FROM spontaneously  No cvat  Skin: Dry, intact. Neuro: Awake, alert, GCS15, CN II-XII grossly intact. Motor grossly intact. Psychiatric/Behavioral: interacting normally; appropriate mood/affect.  Exam: deferred    Vitals:    07/13/23 1729   BP: 168/98   Pulse: 98   Resp: 20   SpO2: 97%     - There are no obvious limitations to social determinants of care. - Nursing note reviewed. - Vitals reviewed. - Orders placed by myself and/or advanced practitioner / resident.    - Previous chart was reviewed  - No language barrier.   - History obtained from patient. - There are no limitations to the history obtained:     Past Medical:    has a past medical history of Back pain and Testicle pain. Past Surgical:    has a past surgical history that includes FL injection right shoulder (arthrogram) (2/6/2023).     Social:     Social History     Substance and Sexual Activity   Alcohol Use Yes    Comment: socially     Social History     Tobacco Use Smoking Status Never   Smokeless Tobacco Never     Social History     Substance and Sexual Activity   Drug Use No       Echo:   No results found for this or any previous visit. No results found for this or any previous visit. Cath:    No results found for this or any previous visit. Code Status: Prior  Advance Directive and Living Will:      Power of :    POLST:    Medications   aluminum-magnesium hydroxide-simethicone (MAALOX) oral suspension 30 mL (30 mL Oral Given 7/13/23 1841)   sodium chloride 0.9 % bolus 1,000 mL (0 mL Intravenous Stopped 7/13/23 2107)   iohexol (OMNIPAQUE) 350 MG/ML injection (SINGLE-DOSE) 100 mL (100 mL Intravenous Given 7/13/23 1942)     CT abdomen pelvis with contrast   Final Result      No acute inflammatory process identified in the abdomen or pelvis. Gallbladder appears contracted. No calcified stones or pericholecystic inflammation. Enlarged fatty liver. Splenomegaly. Workstation performed: [id number]           Orders Placed This Encounter   Procedures   • CT abdomen pelvis with contrast   • CBC and differential   • Comprehensive metabolic panel   • Lipase   • UA w Reflex to Microscopic w Reflex to Culture   • Urine Microscopic     Labs Reviewed   COMPREHENSIVE METABOLIC PANEL - Abnormal       Result Value Ref Range Status    Sodium 136  135 - 147 mmol/L Final    Potassium 4.0  3.5 - 5.3 mmol/L Final    Chloride 108  96 - 108 mmol/L Final    CO2 22  21 - 32 mmol/L Final    ANION GAP 6  mmol/L Final    BUN 14  5 - 25 mg/dL Final    Creatinine 0.84  0.60 - 1.30 mg/dL Final    Comment: Standardized to IDMS reference method    Glucose 162 (*) 65 - 140 mg/dL Final    Comment: If the patient is fasting, the ADA then defines impaired fasting glucose as > 100 mg/dL and diabetes as > or equal to 123 mg/dL. Specimen collection should occur prior to Sulfasalazine administration due to the potential for falsely depressed results.  Specimen collection should occur prior to Sulfapyridine administration due to the potential for falsely elevated results. Calcium 9.3  8.3 - 10.1 mg/dL Final    AST 54 (*) 5 - 45 U/L Final    Comment: Specimen collection should occur prior to Sulfasalazine administration due to the potential for falsely depressed results.  (*) 12 - 78 U/L Final    Comment: Specimen collection should occur prior to Sulfasalazine and/or Sulfapyridine administration due to the potential for falsely depressed results. Alkaline Phosphatase 76  46 - 116 U/L Final    Total Protein 8.2  6.4 - 8.4 g/dL Final    Albumin 3.9  3.5 - 5.0 g/dL Final    Total Bilirubin 0.32  0.20 - 1.00 mg/dL Final    Comment: Use of this assay is not recommended for patients undergoing treatment with eltrombopag due to the potential for falsely elevated results.     eGFR 120  ml/min/1.73sq m Final    Narrative:     National Kidney Disease Foundation guidelines for Chronic Kidney Disease (CKD):   •  Stage 1 with normal or high GFR (GFR > 90 mL/min/1.73 square meters)  •  Stage 2 Mild CKD (GFR = 60-89 mL/min/1.73 square meters)  •  Stage 3A Moderate CKD (GFR = 45-59 mL/min/1.73 square meters)  •  Stage 3B Moderate CKD (GFR = 30-44 mL/min/1.73 square meters)  •  Stage 4 Severe CKD (GFR = 15-29 mL/min/1.73 square meters)  •  Stage 5 End Stage CKD (GFR <15 mL/min/1.73 square meters)  Note: GFR calculation is accurate only with a steady state creatinine   UA W REFLEX TO MICROSCOPIC WITH REFLEX TO CULTURE - Abnormal    Color, UA Light Yellow   Final    Clarity, UA Clear   Final    Specific Gravity, UA 1.031 (*) 1.003 - 1.030 Final    pH, UA 7.0  4.5, 5.0, 5.5, 6.0, 6.5, 7.0, 7.5, 8.0 Final    Leukocytes, UA Negative  Negative Final    Nitrite, UA Negative  Negative Final    Protein, UA 30 (1+) (*) Negative mg/dl Final    Glucose, UA Negative  Negative mg/dl Final    Ketones, UA Negative  Negative mg/dl Final    Urobilinogen, UA 2.0 (*) <2.0 mg/dl mg/dl Final Bilirubin, UA Negative  Negative Final    Occult Blood, UA Negative  Negative Final   URINE MICROSCOPIC - Abnormal    RBC, UA 1-2  None Seen, 1-2 /hpf Final    WBC, UA 1-2  None Seen, 1-2 /hpf Final    Epithelial Cells Occasional  None Seen, Occasional /hpf Final    Bacteria, UA None Seen  None Seen, Occasional /hpf Final    MUCUS THREADS Occasional (*) None Seen Final   LIPASE - Normal    Lipase 79  73 - 393 u/L Final   CBC AND DIFFERENTIAL    WBC 9.51  4.31 - 10.16 Thousand/uL Final    RBC 5.08  3.88 - 5.62 Million/uL Final    Hemoglobin 15.0  12.0 - 17.0 g/dL Final    Hematocrit 45.2  36.5 - 49.3 % Final    MCV 89  82 - 98 fL Final    MCH 29.5  26.8 - 34.3 pg Final    MCHC 33.2  31.4 - 37.4 g/dL Final    RDW 12.8  11.6 - 15.1 % Final    MPV 9.8  8.9 - 12.7 fL Final    Platelets 252  344 - 390 Thousands/uL Final    nRBC 0  /100 WBCs Final    Neutrophils Relative 62  43 - 75 % Final    Immat GRANS % 1  0 - 2 % Final    Lymphocytes Relative 29  14 - 44 % Final    Monocytes Relative 6  4 - 12 % Final    Eosinophils Relative 2  0 - 6 % Final    Basophils Relative 0  0 - 1 % Final    Neutrophils Absolute 5.87  1.85 - 7.62 Thousands/µL Final    Immature Grans Absolute 0.05  0.00 - 0.20 Thousand/uL Final    Lymphocytes Absolute 2.77  0.60 - 4.47 Thousands/µL Final    Monocytes Absolute 0.57  0.17 - 1.22 Thousand/µL Final    Eosinophils Absolute 0.22  0.00 - 0.61 Thousand/µL Final    Basophils Absolute 0.03  0.00 - 0.10 Thousands/µL Final     Time reflects when diagnosis was documented in both MDM as applicable and the Disposition within this note     Time User Action Codes Description Comment    7/13/2023  9:08 PM [place] Charles Add [R10.9] Left flank pain     7/13/2023  9:08 PM Emily Charles Add [R10.9] Abdominal pain       ED Disposition     ED Disposition   Discharge    Condition   Stable    Date/Time   u Jul 13, 2023  9:08 PM    Comment   Arnaud Gomez discharge to home/self care. Follow-up Information     Follow up With Specialties Details Why Contact Info Additional [address] [facility] Emergency Department Emergency Medicine Go to  As needed, If symptoms worsen [address] [phone]  [facility] Emergency Department, [address], [place], [place], [address] [place] [address] Schedule an appointment as soon as possible for a visit  for follow up [address] [facility], [address] [place] [phone]  [address] [facility], [address] [facility], [address] [facility]        Discharge Medication List as of 7/13/2023  9:08 PM      CONTINUE these medications which have NOT CHANGED    Details   famciclovir (FAMVIR) 500 mg tablet Take 1 tablet (500 mg total) by mouth 3 (three) times a day for 5 days, Starting Tue 7/11/2023, Until Sun 7/16/2023, Normal           No discharge procedures on file. Prior to Admission Medications   Prescriptions Last Dose Informant Patient Reported? Taking?   famciclovir (FAMVIR) 500 mg tablet   No No   Sig: Take 1 tablet (500 mg total) by mouth 3 (three) times a day for 5 days      Facility-Administered Medications: None                        Portions of the record may have been created with voice recognition software. Occasional wrong word or "sound a like" substitutions may have occurred due to the inherent limitations of voice recognition software. Read the chart carefully and recognize, using context, where substitutions have occurred.     Electronically signed by:  Kevin Salas

## 2023-07-13 NOTE — ED PROVIDER NOTES
History  Chief Complaint   Patient presents with   • Flank Pain     Pt has been having L flank pain for about 4 days. Noted mild diarrhea. Pain radiates into abdomen. Patient is a 64-year-old male presenting to the emergency department for evaluation of left belly pain over the past 4 days feels like cramping's. Endorses not related to bowel movements. His last bowel movement was yesterday. Patient states that he sometimes has pain in the right upper quadrant after eating. Pain is intermittent in nature he denies any dysuria hematuria any current nausea vomiting diarrhea or constipation denies any chest pain or shortness of breath. He denies any viral URI-like symptoms          Prior to Admission Medications   Prescriptions Last Dose Informant Patient Reported? Taking?   famciclovir (FAMVIR) 500 mg tablet   No No   Sig: Take 1 tablet (500 mg total) by mouth 3 (three) times a day for 5 days      Facility-Administered Medications: None       Past Medical History:   Diagnosis Date   • Back pain    • Testicle pain        Past Surgical History:   Procedure Laterality Date   • FL INJECTION RIGHT SHOULDER (ARTHROGRAM)  2/6/2023       Family History   Problem Relation Age of Onset   • Diabetes Mother    • Cancer Mother    • Heart attack Mother    • Snoring Father         suspected MYRIAM   • Stroke Father    • Cancer Father      I have reviewed and agree with the history as documented. E-Cigarette/Vaping   • E-Cigarette Use Never User      E-Cigarette/Vaping Substances     Social History     Tobacco Use   • Smoking status: Never   • Smokeless tobacco: Never   Vaping Use   • Vaping Use: Never used   Substance Use Topics   • Alcohol use: Yes     Comment: socially   • Drug use: No        Review of Systems   Constitutional: Negative for activity change, chills, fatigue and fever. HENT: Negative for congestion, ear pain and sore throat. Eyes: Negative for pain and visual disturbance.    Respiratory: Negative for cough, chest tightness and shortness of breath. Cardiovascular: Negative for chest pain and palpitations. Gastrointestinal: Positive for abdominal pain. Negative for constipation, diarrhea, nausea and vomiting. Genitourinary: Negative for dysuria and hematuria. Musculoskeletal: Negative for arthralgias and back pain. Skin: Negative for color change and rash. Neurological: Negative for dizziness, seizures, syncope, weakness, light-headedness and headaches. Psychiatric/Behavioral: Negative for agitation. All other systems reviewed and are negative. Physical Exam  ED Triage Vitals [07/13/23 1729]   Temp Pulse Respirations Blood Pressure SpO2   -- 98 20 168/98 97 %      Temp src Heart Rate Source Patient Position - Orthostatic VS BP Location FiO2 (%)   -- -- -- -- --      Pain Score       6             Orthostatic Vital Signs  Vitals:    07/13/23 1729   BP: 168/98   Pulse: 98       Physical Exam  Vitals and nursing note reviewed. Constitutional:       General: He is not in acute distress. Appearance: Normal appearance. He is well-developed. HENT:      Head: Normocephalic and atraumatic. Right Ear: External ear normal.      Left Ear: External ear normal.      Nose: Nose normal.      Mouth/Throat:      Mouth: Mucous membranes are moist.   Eyes:      Extraocular Movements: Extraocular movements intact. Conjunctiva/sclera: Conjunctivae normal.   Cardiovascular:      Rate and Rhythm: Normal rate and regular rhythm. Heart sounds: Normal heart sounds. No murmur heard. Pulmonary:      Effort: Pulmonary effort is normal. No respiratory distress. Breath sounds: Normal breath sounds. Abdominal:      General: Abdomen is flat. Palpations: Abdomen is soft. Tenderness: There is no abdominal tenderness. Musculoskeletal:         General: No swelling. Normal range of motion. Cervical back: Normal range of motion and neck supple.    Skin:     General: Skin is warm and dry.      Capillary Refill: Capillary refill takes less than 2 seconds. Neurological:      General: No focal deficit present. Mental Status: He is alert and oriented to person, place, and time.    Psychiatric:         Mood and Affect: Mood normal.         Behavior: Behavior normal.         ED Medications  Medications   aluminum-magnesium hydroxide-simethicone (MAALOX) oral suspension 30 mL (30 mL Oral Given 7/13/23 1841)   sodium chloride 0.9 % bolus 1,000 mL (1,000 mL Intravenous New Bag 7/13/23 1929)   iohexol (OMNIPAQUE) 350 MG/ML injection (SINGLE-DOSE) 100 mL (100 mL Intravenous Given 7/13/23 1942)       Diagnostic Studies  Results Reviewed     Procedure Component Value Units Date/Time    Urine Microscopic [[id number]]  (Abnormal) Collected: 07/13/23 1825    Lab Status: Final result Specimen: Urine, Clean Catch Updated: 07/13/23 1855     RBC, UA 1-2 /hpf      WBC, UA 1-2 /hpf      Epithelial Cells Occasional /hpf      Bacteria, UA None Seen /hpf      MUCUS THREADS Occasional    UA w Reflex to Microscopic w Reflex to Culture [[id number]]  (Abnormal) Collected: 07/13/23 1825    Lab Status: Final result Specimen: Urine, Clean Catch Updated: 07/13/23 1853     Color, UA Light Yellow     Clarity, UA Clear     Specific Gravity, UA 1.031     pH, UA 7.0     Leukocytes, UA Negative     Nitrite, UA Negative     Protein, UA 30 (1+) mg/dl      Glucose, UA Negative mg/dl      Ketones, UA Negative mg/dl      Urobilinogen, UA 2.0 mg/dl      Bilirubin, UA Negative     Occult Blood, UA Negative    Comprehensive metabolic panel [[id number]]  (Abnormal) Collected: 07/13/23 1741    Lab Status: Final result Specimen: Blood from Hand, Left Updated: 07/13/23 1812     Sodium 136 mmol/L      Potassium 4.0 mmol/L      Chloride 108 mmol/L      CO2 22 mmol/L      ANION GAP 6 mmol/L      BUN 14 mg/dL      Creatinine 0.84 mg/dL      Glucose 162 mg/dL      Calcium 9.3 mg/dL      AST 54 U/L       U/L      Alkaline Phosphatase 76 U/L Total Protein 8.2 g/dL      Albumin 3.9 g/dL      Total Bilirubin 0.32 mg/dL      eGFR 120 ml/min/1.73sq m     Narrative:      [place] guidelines for Chronic Kidney Disease (CKD):   •  Stage 1 with normal or high GFR (GFR > 90 mL/min/1.73 square meters)  •  Stage 2 Mild CKD (GFR = 60-89 mL/min/1.73 square meters)  •  Stage 3A Moderate CKD (GFR = 45-59 mL/min/1.73 square meters)  •  Stage 3B Moderate CKD (GFR = 30-44 mL/min/1.73 square meters)  •  Stage 4 Severe CKD (GFR = 15-29 mL/min/1.73 square meters)  •  Stage 5 End Stage CKD (GFR <15 mL/min/1.73 square meters)  Note: GFR calculation is accurate only with a steady state creatinine    Lipase [[id number]]  (Normal) Collected: 07/13/23 1741    Lab Status: Final result Specimen: Blood from Hand, Left Updated: 07/13/23 1812     Lipase 79 u/L     CBC and differential [[id number]] Collected: 07/13/23 1741    Lab Status: Final result Specimen: Blood from Hand, Left Updated: 07/13/23 1753     WBC 9.51 Thousand/uL      RBC 5.08 Million/uL      Hemoglobin 15.0 g/dL      Hematocrit 45.2 %      MCV 89 fL      MCH 29.5 pg      MCHC 33.2 g/dL      RDW 12.8 %      MPV 9.8 fL      Platelets 026 Thousands/uL      nRBC 0 /100 WBCs      Neutrophils Relative 62 %      Immat GRANS % 1 %      Lymphocytes Relative 29 %      Monocytes Relative 6 %      Eosinophils Relative 2 %      Basophils Relative 0 %      Neutrophils Absolute 5.87 Thousands/µL      Immature Grans Absolute 0.05 Thousand/uL      Lymphocytes Absolute 2.77 Thousands/µL      Monocytes Absolute 0.57 Thousand/µL      Eosinophils Absolute 0.22 Thousand/µL      Basophils Absolute 0.03 Thousands/µL                  CT abdomen pelvis with contrast   Final Result by Korey Oconnell MD (07/13 2105)      No acute inflammatory process identified in the abdomen or pelvis. Gallbladder appears contracted. No calcified stones or pericholecystic inflammation. Enlarged fatty liver. Splenomegaly. Workstation performed: [id number]               Procedures  Procedures      ED Course  ED Course as of 07/13/23 2111   Thu Jul 13, 2023   1856 Bacteria, UA: None Seen   2107 No acute inflammatory process identified in the abdomen or pelvis.     Gallbladder appears contracted. No calcified stones or pericholecystic inflammation.     Enlarged fatty liver.     Splenomegaly. Medical Decision Making  Patient is a 41-year-old male presenting to the emergency department for evaluation of flank pain as well as abdominal pain. Abdomen soft nontender nondistended. Will evaluate patient with CMP for liver enzyme elevation that could signal cholecystitis, biliary obstructive disease as well as electrolyte abnormalities. Lipase given abdominal pain to evaluate specifically for pancreatitis Evaluate with U/A  for UTI or signs of pyelonephritis. Lastly, will consider abdominal imaging. CT AP w Contrast: r/o appendicitis, cholecystitis, bowel obstruction or other acute abdominal pathology. All laboratory and imaging findings stated in chart. Patient is aware of all laboratory and imaging findings advised help his primary care in a few days for reevaluation. Advised come back to the hospital if develops any new worsening or concerning symptoms patient is aware he has no questions or concerns stable for discharge. Amount and/or Complexity of Data Reviewed  Labs: ordered. Decision-making details documented in ED Course. Radiology: ordered. Risk  OTC drugs. Prescription drug management.             Disposition  Final diagnoses:   Left flank pain   Abdominal pain     Time reflects when diagnosis was documented in both MDM as applicable and the Disposition within this note     Time User Action Codes Description Comment    7/13/2023  9:08 PM Ana Maria Stark Add [R10.9] Left flank pain     7/13/2023  9:08 PM Palladino, [place] Add [R10.9] Abdominal pain ED Disposition     ED Disposition   Discharge    Condition   Stable    Date/Time   Thu Jul 13, 2023  9:08 PM    Comment   Alex Bui discharge to home/self care. Follow-up Information     Follow up With Specialties Details Why Contact Info Additional [address] [facility] Emergency Department Emergency Medicine Go to  As needed, If symptoms worsen [address] [phone]  [facility], [address] Schedule an appointment as soon as possible for a visit  for follow up [address] [phone]  [address] [facility], [address] [facility], [address] [place] [address]          Patient's Medications   Discharge Prescriptions    No medications on file     No discharge procedures on file. PDMP Review     None           ED Provider  Attending physically available and evaluated Alex Bui. I managed the patient along with the ED Attending.     Electronically Signed by         Shayy Smyth DO  07/13/23 211

## 2023-07-14 NOTE — DISCHARGE INSTRUCTIONS
Thank you for coming to the ER today. Please follow up with your primary care doctor in 1-2 days to be re-evaluated. If at any point you experience any new or worsening symptoms do not hesitate to come back to the hospital to be evaluated. Symptoms you should look out for include shortness of breath, increased fevers, or any other new, worsening or concerning symptom. Thank you and hope you have a great rest of your day.

## 2023-07-25 ENCOUNTER — AMB VIDEO VISIT (OUTPATIENT)
Dept: OTHER | Facility: HOSPITAL | Age: 28
End: 2023-07-25
Payer: COMMERCIAL

## 2023-07-25 ENCOUNTER — TELEPHONE (OUTPATIENT)
Dept: FAMILY MEDICINE CLINIC | Facility: CLINIC | Age: 28
End: 2023-07-25

## 2023-07-25 ENCOUNTER — OFFICE VISIT (OUTPATIENT)
Dept: FAMILY MEDICINE CLINIC | Facility: CLINIC | Age: 28
End: 2023-07-25

## 2023-07-25 VITALS
TEMPERATURE: 98.2 F | OXYGEN SATURATION: 98 % | WEIGHT: 315 LBS | DIASTOLIC BLOOD PRESSURE: 78 MMHG | SYSTOLIC BLOOD PRESSURE: 134 MMHG | BODY MASS INDEX: 55.94 KG/M2 | HEART RATE: 92 BPM

## 2023-07-25 DIAGNOSIS — R10.13 EPIGASTRIC PAIN: Primary | ICD-10-CM

## 2023-07-25 PROCEDURE — 99213 OFFICE O/P EST LOW 20 MIN: CPT | Performed by: FAMILY MEDICINE

## 2023-07-25 PROCEDURE — 99212 OFFICE O/P EST SF 10 MIN: CPT | Performed by: FAMILY MEDICINE

## 2023-07-25 RX ORDER — DICYCLOMINE HYDROCHLORIDE 10 MG/1
10 CAPSULE ORAL
Qty: 60 CAPSULE | Refills: 0 | Status: SHIPPED | OUTPATIENT
Start: 2023-07-25 | End: 2023-08-02

## 2023-07-25 NOTE — CARE ANYWHERE EVISITS
Visit Summary for Kimberly Mccarthy . Kevin Clemens - Gender: Male - Date of Birth: [id number]  Date: [id number] - Duration: 4 minutes  Patient: Kimberly Fuller Kevin Clemens  Provider: Jose Osborn    Patient Contact Information  Address  [address] [place] [address]  [phone]    Visit Topics  Stomach pain, spasms in abdomen and back, headaches and slight fever, irregular bowel movements   [Added By: Self - 2023-07-25]    Sick Slip  Reason [ILLNESS]. Remarks [Please excuse the patient from work on 7/25/23]. Triage Questions   What is your current physical address in the event of a medical emergency? Answer []  Are you allergic to any medications? Answer []  Are you now or could you be pregnant? Answer []  Do you have any immune system compromise or chronic lung   disease? Answer []  Do you have any vulnerable family members in the home (infant, pregnant, cancer, elderly)? Answer []     Conversation Transcripts  [0A][0A] [Notification] You are connected with Jose Osborn Family Physician.[0A][Notification] Annette Martinez is located in [place]. [0A][Notification] Annette Martinez has shared health history. Bhavya Martinez .[0A][Notification] Jose Osborn has   added a diagnosis/procedure code. [0A][Notification] Jose Osborn has issued a sick slip. [0A]    Diagnosis  Generalized abdominal pain    Procedures    Medications Prescribed    No prescriptions ordered    Provider Notes  [0A][0A] We strongly encourage you to share the following record of today's visit with your primary care physician. [0A]Contact phone number: [0A]Mode of Communication:  Video[0A]HPI: The patient was having pain in left flank 1 week ago and went to the   hospital and was diagnosed with unknown. Was on antiviral for cold sores and had MRI, gallbladder and kidneys everything was normal including blood work and urinalysis. Can't see PCP for 1 week. Same symptoms but worsening spasms and back pain. BM's   abnormal. Has had 102 fever. [0A]PMH: None[0A]PSH: None[0A]Meds: Famcyclovir[0A]Allergies: NKDSA[0A]Exam: [0A]Gen: Alert, normal mental status and interaction, no visible distress, non- toxic appearance. Abdomin. Tenderness to patient palpation in left   upper abdomen[0A]Assessment: abdominal pain[0A]Plan: [0A]1. I am recommending in person exam for further evaluation and treatment[0A]2. Discussed precautions. [0A]Follow up:[0A]1. If there are any questions or problems with the prescription, call   [phone] anytime for assistance. [0A]2. Please  see an in-person provider now[0A]3. Taking a probiotic (either in pill form or by eating yogurt that contains probiotics) while using antibiotics can help prevent some of the troublesome side effects   that antibiotics can sometimes cause. [0A]4. Please print a copy of this note and send it to your regular doctor, or take it to your next visit so it may be included in your medical record. [0A]Patient voiced understanding and agrees to plan. [0A]Please   see your PCP on an annual basis. [0A]    Electronically signed by: Danna Petersen(NPI [id number])

## 2023-07-25 NOTE — ASSESSMENT & PLAN NOTE
Pt has LUQ and epigastric abdominal pain for past few weeks with occasional abdominal spasms. Pt went to ED 7/13/23 with no acute findings. Pain is worse after eating. Pt denies heavy NSAID and alcohol use. Concern for peptic ulcer disease. Pt has not been on antibiotics and PPI recently. Ordered H. Pylori urea breath test. Will follow up with pt once results are complete. Also gave pt Bentyl to control abdominal spasms.

## 2023-07-25 NOTE — PROGRESS NOTES
Name: Pradeep Sorensen      : 1995      MRN: [id number]  Encounter Provider: Hafsa Courtney DO  Encounter Date: 2023   Encounter department: [address]     1. Epigastric pain  Assessment & Plan:  Pt has LUQ and epigastric abdominal pain for past few weeks with occasional abdominal spasms. Pt went to ED 23 with no acute findings. Pain is worse after eating. Pt denies heavy NSAID and alcohol use. Concern for peptic ulcer disease. Pt has not been on antibiotics and PPI recently. Ordered H. Pylori urea breath test. Will follow up with pt once results are complete. Also gave pt Bentyl to control abdominal spasms. Orders:  -     H. pylori breath test; Future  -     dicyclomine (BENTYL) 10 mg capsule; Take 1 capsule (10 mg total) by mouth 4 (four) times a day (before meals and at bedtime)         Subjective      32 yo male presents with abdominal pain. This started a few weeks ago. Initially he thought it was food poisoning. Went to ED  with negative workup aside from slightly elevated AST & ALT, elevated glucose, and an enlarged fatty liver and splenomegaly noted on CT. Today he describes the pain as left sided cramping and occasional spasms across his abdomen. The pain is worse and feels like stabbing pain after eating. He has several bowel movements a day but feels that they are incomplete. Somewhat loose stools, but took a stool softener for 2 days. No blood in stool. He feels gaseous but has difficulty passing gas. He feels hungry but has a sensation of fullness. He has some nausea, but no vomiting. He spiked one fever to 102 last week but it broke right away. No other fevers. He had headaches for a few days which have resolved. He has chronic back pain and reports that his abdominal pain exacerbated this and he could not get out of bed yesterday morning due to stiffness and pain.  He takes no daily medications and only uses NSAIDs once in a while. He has not drank alcohol for 2 months and before that was a social drinker 1 or 2 beers on weekends. He reports that he has gained ~15 pounds in last few weeks but feels that his lifestyle is the same. Review of Systems   Constitutional: Positive for appetite change and unexpected weight change. Negative for chills and fever. Respiratory: Negative for shortness of breath. Cardiovascular: Negative for chest pain. Gastrointestinal: Positive for abdominal pain and nausea. Negative for blood in stool, constipation, diarrhea and vomiting. Genitourinary: Negative for difficulty urinating and dysuria. Musculoskeletal: Positive for back pain. Neurological: Negative for headaches. Current Outpatient Medications on File Prior to Visit   Medication Sig   • famciclovir (FAMVIR) 500 mg tablet Take 1 tablet (500 mg total) by mouth 3 (three) times a day for 5 days (Patient not taking: Reported on 7/25/2023)       Objective     /78 (BP Location: Right arm, Patient Position: Sitting, Cuff Size: Standard)   Pulse 92   Temp 98.2 °F (36.8 °C) (Temporal)   Wt (!) 192 kg (424 lb)   SpO2 98%   BMI 55.94 kg/m²     Physical Exam  Vitals reviewed. Constitutional:       Appearance: Normal appearance. HENT:      Head: Normocephalic and atraumatic. Cardiovascular:      Rate and Rhythm: Normal rate and regular rhythm. Pulses: Normal pulses. Heart sounds: Normal heart sounds. Pulmonary:      Effort: Pulmonary effort is normal.      Breath sounds: Normal breath sounds. Abdominal:      General: Bowel sounds are normal.      Palpations: Abdomen is soft. Tenderness: There is abdominal tenderness in the epigastric area and left upper quadrant. There is no rebound. Musculoskeletal:         General: Normal range of motion. Cervical back: Normal range of motion. Skin:     General: Skin is warm and dry. Neurological:      Mental Status: He is alert.  Mental status is at baseline. Psychiatric:         Mood and Affect: Mood normal.         Behavior: Behavior normal.         Thought Content:  Thought content normal.         Judgment: Judgment normal.       Shaila Woods, DO

## 2023-07-25 NOTE — TELEPHONE ENCOUNTER
Patient just completed appt with Dr. Fleming Home and needs an absence letter for his employer. Can this be done?

## 2023-07-29 ENCOUNTER — HOSPITAL ENCOUNTER (EMERGENCY)
Facility: HOSPITAL | Age: 28
Discharge: HOME | End: 2023-07-29
Attending: EMERGENCY MEDICINE | Admitting: EMERGENCY MEDICINE
Payer: COMMERCIAL

## 2023-07-29 VITALS
SYSTOLIC BLOOD PRESSURE: 119 MMHG | RESPIRATION RATE: 20 BRPM | TEMPERATURE: 98.6 F | DIASTOLIC BLOOD PRESSURE: 58 MMHG | OXYGEN SATURATION: 97 % | HEART RATE: 86 BPM

## 2023-07-29 DIAGNOSIS — M54.9 BACK PAIN: Primary | ICD-10-CM

## 2023-07-29 LAB
ANION GAP SERPL CALCULATED.3IONS-SCNC: 3 MMOL/L
BUN SERPL-MCNC: 13 MG/DL (ref 5–25)
CALCIUM SERPL-MCNC: 9.5 MG/DL (ref 8.3–10.1)
CHLORIDE SERPL-SCNC: 111 MMOL/L (ref 96–108)
CO2 SERPL-SCNC: 24 MMOL/L (ref 21–32)
CREAT SERPL-MCNC: 0.79 MG/DL (ref 0.6–1.3)
GFR SERPL CREATININE-BSD FRML MDRD: 123 ML/MIN/1.73SQ M
GLUCOSE SERPL-MCNC: 105 MG/DL (ref 65–140)
POTASSIUM SERPL-SCNC: 4.1 MMOL/L (ref 3.5–5.3)
SODIUM SERPL-SCNC: 138 MMOL/L (ref 135–147)

## 2023-07-29 PROCEDURE — 99283 EMERGENCY DEPT VISIT LOW MDM: CPT

## 2023-07-29 PROCEDURE — 36415 COLL VENOUS BLD VENIPUNCTURE: CPT

## 2023-07-29 PROCEDURE — 80048 BASIC METABOLIC PNL TOTAL CA: CPT

## 2023-07-29 PROCEDURE — 99284 EMERGENCY DEPT VISIT MOD MDM: CPT | Performed by: EMERGENCY MEDICINE

## 2023-07-29 PROCEDURE — 96372 THER/PROPH/DIAG INJ SC/IM: CPT

## 2023-07-29 RX ORDER — KETOROLAC TROMETHAMINE 30 MG/ML
15 INJECTION, SOLUTION INTRAMUSCULAR; INTRAVENOUS ONCE
Status: COMPLETED | OUTPATIENT
Start: 2023-07-29 | End: 2023-07-29

## 2023-07-29 RX ORDER — DIAZEPAM 5 MG/1
10 TABLET ORAL ONCE
Status: COMPLETED | OUTPATIENT
Start: 2023-07-29 | End: 2023-07-29

## 2023-07-29 RX ORDER — LIDOCAINE 50 MG/G
2 PATCH TOPICAL ONCE
Status: DISCONTINUED | OUTPATIENT
Start: 2023-07-29 | End: 2023-07-29 | Stop reason: HOSPADM

## 2023-07-29 RX ORDER — DIAZEPAM 5 MG/1
5 TABLET ORAL 2 TIMES DAILY
Qty: 10 TABLET | Refills: 0 | Status: SHIPPED | OUTPATIENT
Start: 2023-07-29 | End: 2023-07-29 | Stop reason: CLARIF

## 2023-07-29 RX ORDER — ACETAMINOPHEN 325 MG/1
650 TABLET ORAL ONCE
Status: COMPLETED | OUTPATIENT
Start: 2023-07-29 | End: 2023-07-29

## 2023-07-29 RX ORDER — DIAZEPAM 5 MG/1
10 TABLET ORAL EVERY 8 HOURS PRN
Qty: 10 TABLET | Refills: 0 | Status: SHIPPED | OUTPATIENT
Start: 2023-07-29 | End: 2023-07-30 | Stop reason: SDUPTHER

## 2023-07-29 RX ADMIN — LIDOCAINE 2 PATCH: 50 PATCH CUTANEOUS at 15:06

## 2023-07-29 RX ADMIN — KETOROLAC TROMETHAMINE 15 MG: 30 INJECTION, SOLUTION INTRAMUSCULAR; INTRAVENOUS at 15:06

## 2023-07-29 RX ADMIN — ACETAMINOPHEN 650 MG: 325 TABLET ORAL at 14:11

## 2023-07-29 RX ADMIN — DIAZEPAM 10 MG: 5 TABLET ORAL at 15:06

## 2023-07-29 NOTE — DISCHARGE INSTRUCTIONS
Take the medication prescribed as needed for the pain. Follow up with primary physician.    If any new symptoms occur return to the hospital

## 2023-07-29 NOTE — ED PROVIDER NOTES
History  Chief Complaint   Patient presents with   • Back Pain     Back pain for a few days, spasms. 10/10 pain. No medications. HPI  80-year-old male with past medical history of back pain and leg paresthesia comes in after being seen in the ER last week by Dr. Zaheer Avendano for similar symptoms. Today he is complaining of spasm in his abdomen that is radiating towards his back. The patient states that this has been ongoing for the past week after eating [place] food. He states that he has ongoing diarrhea. He does not have fever chills nausea. He has not had any relief with over-the-counter NSAIDs. He did not have any sensory or motor deficit at this time. Prior to Admission Medications   Prescriptions Last Dose Informant Patient Reported? Taking?   dicyclomine (BENTYL) 10 mg capsule   No No   Sig: Take 1 capsule (10 mg total) by mouth 4 (four) times a day (before meals and at bedtime)   famciclovir (FAMVIR) 500 mg tablet   No No   Sig: Take 1 tablet (500 mg total) by mouth 3 (three) times a day for 5 days   Patient not taking: Reported on 7/25/2023      Facility-Administered Medications: None       Past Medical History:   Diagnosis Date   • Back pain    • Testicle pain        Past Surgical History:   Procedure Laterality Date   • FL INJECTION RIGHT SHOULDER (ARTHROGRAM)  2/6/2023       Family History   Problem Relation Age of Onset   • Diabetes Mother    • Cancer Mother    • Heart attack Mother    • Snoring Father         suspected MYRIAM   • Stroke Father    • Cancer Father      I have reviewed and agree with the history as documented. E-Cigarette/Vaping   • E-Cigarette Use Never User      E-Cigarette/Vaping Substances     Social History     Tobacco Use   • Smoking status: Never   • Smokeless tobacco: Never   Vaping Use   • Vaping Use: Never used   Substance Use Topics   • Alcohol use: Yes     Comment: socially   • Drug use: No        Review of Systems   Constitutional: Negative for chills and fever.    HENT: Negative for ear pain and sore throat. Eyes: Negative for pain and visual disturbance. Respiratory: Negative for cough and shortness of breath. Cardiovascular: Negative for chest pain and palpitations. Gastrointestinal: Negative for abdominal pain and vomiting. Genitourinary: Negative for dysuria and hematuria. Musculoskeletal: Positive for arthralgias, back pain and myalgias. Skin: Negative for color change and rash. Neurological: Negative for seizures and syncope. All other systems reviewed and are negative. Physical Exam  ED Triage Vitals   Temperature Pulse Respirations Blood Pressure SpO2   07/29/23 1324 07/29/23 1324 07/29/23 1324 07/29/23 1325 07/29/23 1324   98.6 °F (37 °C) 93 20 128/65 96 %      Temp Source Heart Rate Source Patient Position - Orthostatic VS BP Location FiO2 (%)   07/29/23 1324 07/29/23 1324 07/29/23 1325 07/29/23 1325 --   Oral Monitor Lying Left arm       Pain Score       07/29/23 1506       10 - Worst Possible Pain             Orthostatic Vital Signs  Vitals:    07/29/23 1324 07/29/23 1325 07/29/23 1545   BP:  128/65 119/58   Pulse: 93  86   Patient Position - Orthostatic VS:  Lying Sitting       Physical Exam  Vitals and nursing note reviewed. Constitutional:       General: He is not in acute distress. Appearance: He is well-developed. He is obese. HENT:      Head: Normocephalic and atraumatic. Eyes:      Conjunctiva/sclera: Conjunctivae normal.   Cardiovascular:      Rate and Rhythm: Normal rate and regular rhythm. Heart sounds: No murmur heard. Pulmonary:      Effort: Pulmonary effort is normal. No respiratory distress. Breath sounds: Normal breath sounds. Abdominal:      General: There is distension. Palpations: Abdomen is soft. Tenderness: There is abdominal tenderness. Musculoskeletal:         General: No swelling. Cervical back: Neck supple. Skin:     General: Skin is warm and dry.       Capillary Refill: Capillary refill takes less than 2 seconds. Neurological:      Mental Status: He is alert. Cranial Nerves: No cranial nerve deficit. Sensory: No sensory deficit. Motor: No weakness. Coordination: Coordination normal.      Gait: Gait normal.      Deep Tendon Reflexes: Reflexes normal.   Psychiatric:         Mood and Affect: Mood normal.         ED Medications  Medications   lidocaine (LIDODERM) 5 % patch 2 patch (2 patches Topical Medication Applied 7/29/23 1506)   acetaminophen (TYLENOL) tablet 650 mg (650 mg Oral Given 7/29/23 1411)   diazepam (VALIUM) tablet 10 mg (10 mg Oral Given 7/29/23 1506)   ketorolac (TORADOL) injection 15 mg (15 mg Intramuscular Given 7/29/23 1506)       Diagnostic Studies  Results Reviewed     Procedure Component Value Units Date/Time    Basic metabolic panel [[id number]] Collected: 07/29/23 1521    Lab Status: In process Specimen: Blood from Hand, Right Updated: 07/29/23 1524                 No orders to display         Procedures  Procedures      ED Course       Patient is seen and examined. His previous chart is reviewed. Previous imaging is reviewed. Patient's pain is treated with IM Toradol and Valium 10 mg. He states that after receiving as he is had moderate improvement. Medical Decision Making  Amount and/or Complexity of Data Reviewed  Labs: ordered. Risk  OTC drugs. Prescription drug management. Patient is a 33 y.o. male with PMH of back pain with nerve impingement and transient prosthesis in his left leg who presents to the ED with abdominal spasm radiating to his lower back for about a week after eating [place] food. He has had diarrhea but no vomiting fever or chills. .    Vital signs   Vitals:    07/29/23 1545   BP: 119/58   Pulse: 86   Resp:    Temp:    SpO2: 97%     . On exam the patient has tenderness in the splenic region. On previous exam it was noted that he does have splenomegaly. Cheyenne Frias     History and physical exam most consistent with muscle spasm. Plan treat pain and spasm with Valium and IM Toradol    View ED course above for further discussion on patient workup. All labs reviewed and utilized in the medical decision making process  All radiology studies independently viewed by me and interpreted by the radiologist.  I reviewed all testing with the patient. Upon re-evaluation the patient has moderate improvement of her symptoms and is okay with follow-up with primary care physician. Valium was sent to his pharmacy for as needed breakthrough pain. Jonathan Arora Disposition  Final diagnoses:   Back pain     Time reflects when diagnosis was documented in both MDM as applicable and the Disposition within this note     Time User Action Codes Description Comment    7/29/2023  3:37 PM Maddi Flynn Add [M54.9] Back pain       ED Disposition     ED Disposition   Discharge    Condition   Stable    Date/Time   Sat Jul 29, 2023  3:37 PM    Comment   Tracy Simpson discharge to home/self care.                Follow-up Information     Follow up With Specialties Details Why Contact Info Additional [address] [facility]    [phone]       [address] [place] [address] Emergency Department Emergency Medicine   [address] [facility] [id number]  [facility] Emergency Department, [address] [place] [address]          Discharge Medication List as of 7/29/2023  3:43 PM      START taking these medications    Details   diazepam (VALIUM) 5 mg tablet Take 1 tablet (5 mg total) by mouth 2 (two) times a day for 5 days, Starting Sat 7/29/2023, Until Thu 8/3/2023, Print         CONTINUE these medications which have NOT CHANGED    Details   dicyclomine (BENTYL) 10 mg capsule Take 1 capsule (10 mg total) by mouth 4 (four) times a day (before meals and at bedtime), Starting Tue 7/25/2023, Normal      famciclovir (FAMVIR) 500 mg tablet Take 1 tablet (500 mg total) by mouth 3 (three) times a day for 5 days, Starting Tue 7/11/2023, Until Sun 7/16/2023, Normal           No discharge procedures on file. PDMP Review     None           ED Provider  Attending physically available and evaluated Ernie Shepard. I managed the patient along with the ED Attending.     Electronically Signed by         Ava Delgado MD  07/29/23 5496

## 2023-07-29 NOTE — ED ATTENDING ATTESTATION
7/29/2023  Milly DE LA ROSA DO, saw and evaluated the patient. I have discussed the patient with the resident/non-physician practitioner and agree with the resident's/non-physician practitioner's findings, Plan of Care, and MDM as documented in the resident's/non-physician practitioner's note, except where noted. All available labs and Radiology studies were reviewed. I was present for key portions of any procedure(s) performed by the resident/non-physician practitioner and I was immediately available to provide assistance. At this point I agree with the current assessment done in the Emergency Department. I have conducted an independent evaluation of this patient a history and physical is as follows:    32 yo male presents for evaluation of L sided lower/mid back pain which started after developing n/v/d after eating chinese food - his friend who had the same food also developed similar symptoms. Continues to have some diarrhea but seems to be slowing down. No vomiting, fever, weakness/numbness/tingling. Pain worse in AM, or when he goes from sitting to standing. No other c/o at this time. No urinary sxs    No midline T/L spine TTP  Able to stand on heels and toes      Imp: back pain MSK likely spasm plan: MMA, reassess.       ED Course         Critical Care Time  Procedures

## 2023-07-30 RX ORDER — DIAZEPAM 5 MG/1
10 TABLET ORAL EVERY 8 HOURS PRN
Qty: 10 TABLET | Refills: 0 | Status: SHIPPED | OUTPATIENT
Start: 2023-07-30

## 2023-07-31 ENCOUNTER — TELEPHONE (OUTPATIENT)
Dept: FAMILY MEDICINE CLINIC | Facility: CLINIC | Age: 28
End: 2023-07-31

## 2023-08-02 DIAGNOSIS — R10.13 EPIGASTRIC PAIN: ICD-10-CM

## 2023-08-02 RX ORDER — DICYCLOMINE HYDROCHLORIDE 10 MG/1
CAPSULE ORAL
Qty: 360 CAPSULE | Refills: 1 | Status: SHIPPED | OUTPATIENT
Start: 2023-08-02

## 2023-08-03 ENCOUNTER — APPOINTMENT (OUTPATIENT)
Dept: LAB | Facility: HOSPITAL | Age: 28
End: 2023-08-03
Payer: COMMERCIAL

## 2023-08-03 DIAGNOSIS — R10.13 EPIGASTRIC PAIN: ICD-10-CM

## 2023-08-03 PROCEDURE — 83013 H PYLORI (C-13) BREATH: CPT

## 2023-08-03 PROCEDURE — 83014 H PYLORI DRUG ADMIN: CPT

## 2023-08-04 ENCOUNTER — OFFICE VISIT (OUTPATIENT)
Dept: FAMILY MEDICINE CLINIC | Facility: CLINIC | Age: 28
End: 2023-08-04

## 2023-08-04 VITALS
SYSTOLIC BLOOD PRESSURE: 117 MMHG | DIASTOLIC BLOOD PRESSURE: 79 MMHG | HEART RATE: 91 BPM | WEIGHT: 315 LBS | TEMPERATURE: 97.2 F | BODY MASS INDEX: 55.7 KG/M2 | OXYGEN SATURATION: 99 %

## 2023-08-04 DIAGNOSIS — R10.84 GENERALIZED ABDOMINAL PAIN: Primary | ICD-10-CM

## 2023-08-04 PROCEDURE — 99213 OFFICE O/P EST LOW 20 MIN: CPT | Performed by: FAMILY MEDICINE

## 2023-08-04 RX ORDER — METHOCARBAMOL 500 MG/1
500 TABLET, FILM COATED ORAL 4 TIMES DAILY
Qty: 30 TABLET | Refills: 1 | Status: SHIPPED | OUTPATIENT
Start: 2023-08-04

## 2023-08-06 PROBLEM — R10.84 GENERALIZED ABDOMINAL PAIN: Status: ACTIVE | Noted: 2023-08-06

## 2023-08-06 NOTE — ASSESSMENT & PLAN NOTE
- Follow up for generalized abdominal pain   - H. Pylori breath test performed; awaiting results  - Continues with abdominal spasms with back pain and b/l hip pain, nausea, bloating, diarreha. States all symptoms started after the [place] food. Denies fevers, blood in stool. Used Bentyl - minimal relief. · Robaxin 500 mg BID PRN  · F/U H.  Pylori breath test results  · Celiac antibody testing  · Consider stool test

## 2023-08-06 NOTE — PROGRESS NOTES
Name: Juve Lilly      : 1995      MRN: [id number]  Encounter Provider: Radha Mckeon MD  Encounter Date: 2023   Encounter department: [address]     1. Generalized abdominal pain  Assessment & Plan:  - Follow up for generalized abdominal pain   - H. Pylori breath test performed; awaiting results  - Continues with abdominal spasms with back pain and b/l hip pain, nausea, bloating, diarreha. States all symptoms started after the [place] food. Denies fevers, blood in stool. Used Bentyl - minimal relief. · Robaxin 500 mg BID PRN  · F/U H. Pylori breath test results  · Celiac antibody testing  · Consider stool test     Orders:  -     methocarbamol (ROBAXIN) 500 mg tablet; Take 1 tablet (500 mg total) by mouth 4 (four) times a day  -     Celiac Disease Antibody Profile; Future         Subjective      23-year-old male presents for follow-up for continued abdominal pain from abdominal spasms with radiation to his back. ED visit on  for abdominal spasms that radiate to back with diarrhea after eating [place] food 1 week prior. Treated with IVF, Maalox. Last visit to clinic on 23 with continued symtpoms - prescribed Bentyl, H. Pylori breath test order. ED visit again on  for continued symptoms - Treated with Valium, IM toradol. . Minimal abdominal spasm relief. H. pylori breath test performed; awaiting results. Denies fever, chest pain, shortness of breath, rashes. Pregnant wife at home. Review of Systems   Constitutional: Negative for chills and fever. HENT: Negative for ear pain and sore throat. Eyes: Negative for pain and visual disturbance. Respiratory: Negative for cough and shortness of breath. Cardiovascular: Negative for chest pain and palpitations. Gastrointestinal: Positive for abdominal pain, diarrhea and nausea. Negative for blood in stool and vomiting.    Genitourinary: Negative for dysuria and hematuria. Musculoskeletal: Negative for arthralgias and back pain. Skin: Negative for color change and rash. Neurological: Negative for seizures and syncope. All other systems reviewed and are negative. Current Outpatient Medications on File Prior to Visit   Medication Sig   • dicyclomine (BENTYL) 10 mg capsule TAKE 1 CAPSULE BY MOUTH 4 TIMES A DAY (BEFORE MEALS AND AT BEDTIME)   • diazepam (VALIUM) 5 mg tablet Take 2 tablets (10 mg total) by mouth every 8 (eight) hours as needed for muscle spasms (Patient not taking: Reported on 8/4/2023)   • famciclovir (FAMVIR) 500 mg tablet Take 1 tablet (500 mg total) by mouth 3 (three) times a day for 5 days (Patient not taking: Reported on 7/25/2023)       Objective     /79 (BP Location: Right arm, Patient Position: Sitting, Cuff Size: Large)   Pulse 91   Temp (!) 97.2 °F (36.2 °C) (Temporal)   Wt (!) 192 kg (422 lb 3.2 oz)   SpO2 99%   BMI 55.70 kg/m²     Physical Exam  Vitals reviewed. Constitutional:       General: He is awake. He is not in acute distress. Appearance: Normal appearance. He is obese. He is not ill-appearing. HENT:      Head: Normocephalic and atraumatic. Right Ear: External ear normal.      Left Ear: External ear normal.      Nose: Nose normal.      Mouth/Throat:      Mouth: Mucous membranes are moist.      Pharynx: Oropharynx is clear. Eyes:      Extraocular Movements: Extraocular movements intact. Cardiovascular:      Rate and Rhythm: Normal rate and regular rhythm. Pulses: Normal pulses. Heart sounds: Normal heart sounds. No murmur heard. Pulmonary:      Effort: Pulmonary effort is normal. No respiratory distress. Breath sounds: Normal breath sounds. No wheezing or rales. Abdominal:      General: Bowel sounds are normal. There is no distension. Palpations: Abdomen is soft. Tenderness: There is generalized abdominal tenderness. There is no right CVA tenderness or left CVA tenderness. Comments: Worst on left epigastric   Musculoskeletal:         General: No swelling, deformity or signs of injury. Normal range of motion. Cervical back: Normal range of motion. No rigidity. Skin:     General: Skin is warm and dry. Capillary Refill: Capillary refill takes less than 2 seconds. Findings: No rash. Neurological:      General: No focal deficit present. Mental Status: He is alert and oriented to person, place, and time. Mental status is at baseline. Psychiatric:         Mood and Affect: Mood normal.         Behavior: Behavior normal. Behavior is cooperative.        Maya Bird MD

## 2023-08-07 ENCOUNTER — TELEPHONE (OUTPATIENT)
Dept: FAMILY MEDICINE CLINIC | Facility: CLINIC | Age: 28
End: 2023-08-07

## 2023-08-07 DIAGNOSIS — R10.13 EPIGASTRIC PAIN: Primary | ICD-10-CM

## 2023-08-07 LAB — SCAN RESULT: NORMAL

## 2023-08-07 RX ORDER — OMEPRAZOLE 20 MG/1
20 CAPSULE, DELAYED RELEASE ORAL
Qty: 30 CAPSULE | Refills: 0 | Status: SHIPPED | OUTPATIENT
Start: 2023-08-07 | End: 2023-09-06

## 2023-08-07 NOTE — TELEPHONE ENCOUNTER
Called patient with results regarding H Pylori test which was negative. Since our last visit, pt was seen in ED for back pain and follows with Dr. Lopez Signs for continued abdominal pain. Celiac panel was ordered at that visit and still pending. Pt reports that he continues to have abdominal pain, worse after eating, and intermittent abdominal cramping. Has BM but notes that usually more runny and he often feels they are incomplete. Minimal improvement with Bentyl. Now that H Pylori test is done, recommend trying Tums prn and if only minimal relief with that, take omeprazole 20 mg daily - prescription sent to pharmacy. Pt has follow up appt with Dr. Bethany Grace on 8/18 - will re-access then.

## 2023-08-16 ENCOUNTER — APPOINTMENT (OUTPATIENT)
Dept: LAB | Facility: CLINIC | Age: 28
End: 2023-08-16
Payer: COMMERCIAL

## 2023-08-16 ENCOUNTER — LAB (OUTPATIENT)
Dept: LAB | Facility: CLINIC | Age: 28
End: 2023-08-16
Payer: COMMERCIAL

## 2023-08-16 DIAGNOSIS — Z00.8 HEALTH EXAMINATION IN POPULATION SURVEY: ICD-10-CM

## 2023-08-16 DIAGNOSIS — R04.0 EPISTAXIS: ICD-10-CM

## 2023-08-16 DIAGNOSIS — R10.84 GENERALIZED ABDOMINAL PAIN: ICD-10-CM

## 2023-08-16 LAB
BASOPHILS # BLD AUTO: 0.04 THOUSANDS/ÂΜL (ref 0–0.1)
BASOPHILS NFR BLD AUTO: 1 % (ref 0–1)
CHOLEST SERPL-MCNC: 166 MG/DL
EOSINOPHIL # BLD AUTO: 0.22 THOUSAND/ÂΜL (ref 0–0.61)
EOSINOPHIL NFR BLD AUTO: 3 % (ref 0–6)
ERYTHROCYTE [DISTWIDTH] IN BLOOD BY AUTOMATED COUNT: 13.2 % (ref 11.6–15.1)
EST. AVERAGE GLUCOSE BLD GHB EST-MCNC: 160 MG/DL
HBA1C MFR BLD: 7.2 %
HCT VFR BLD AUTO: 45.5 % (ref 36.5–49.3)
HDLC SERPL-MCNC: 26 MG/DL
HGB BLD-MCNC: 14.7 G/DL (ref 12–17)
IMM GRANULOCYTES # BLD AUTO: 0.02 THOUSAND/UL (ref 0–0.2)
IMM GRANULOCYTES NFR BLD AUTO: 0 % (ref 0–2)
INR PPP: 0.97 (ref 0.84–1.19)
LDLC SERPL CALC-MCNC: 87 MG/DL (ref 0–100)
LYMPHOCYTES # BLD AUTO: 2.42 THOUSANDS/ÂΜL (ref 0.6–4.47)
LYMPHOCYTES NFR BLD AUTO: 29 % (ref 14–44)
MCH RBC QN AUTO: 29.2 PG (ref 26.8–34.3)
MCHC RBC AUTO-ENTMCNC: 32.3 G/DL (ref 31.4–37.4)
MCV RBC AUTO: 90 FL (ref 82–98)
MONOCYTES # BLD AUTO: 0.45 THOUSAND/ÂΜL (ref 0.17–1.22)
MONOCYTES NFR BLD AUTO: 5 % (ref 4–12)
NEUTROPHILS # BLD AUTO: 5.26 THOUSANDS/ÂΜL (ref 1.85–7.62)
NEUTS SEG NFR BLD AUTO: 62 % (ref 43–75)
NONHDLC SERPL-MCNC: 140 MG/DL
NRBC BLD AUTO-RTO: 0 /100 WBCS
PLATELET # BLD AUTO: 233 THOUSANDS/UL (ref 149–390)
PMV BLD AUTO: 10.5 FL (ref 8.9–12.7)
PROTHROMBIN TIME: 13 SECONDS (ref 11.6–14.5)
RBC # BLD AUTO: 5.04 MILLION/UL (ref 3.88–5.62)
TRIGL SERPL-MCNC: 267 MG/DL
WBC # BLD AUTO: 8.41 THOUSAND/UL (ref 4.31–10.16)

## 2023-08-16 PROCEDURE — 86258 DGP ANTIBODY EACH IG CLASS: CPT

## 2023-08-16 PROCEDURE — 83036 HEMOGLOBIN GLYCOSYLATED A1C: CPT

## 2023-08-16 PROCEDURE — 85025 COMPLETE CBC W/AUTO DIFF WBC: CPT

## 2023-08-16 PROCEDURE — 86364 TISS TRNSGLTMNASE EA IG CLAS: CPT

## 2023-08-16 PROCEDURE — 36415 COLL VENOUS BLD VENIPUNCTURE: CPT

## 2023-08-16 PROCEDURE — 82784 ASSAY IGA/IGD/IGG/IGM EACH: CPT

## 2023-08-16 PROCEDURE — 86231 EMA EACH IG CLASS: CPT

## 2023-08-16 PROCEDURE — 80061 LIPID PANEL: CPT

## 2023-08-16 PROCEDURE — 85610 PROTHROMBIN TIME: CPT

## 2023-08-17 LAB
ENDOMYSIUM IGA SER QL: NEGATIVE
GLIADIN PEPTIDE IGA SER-ACNC: 3 UNITS (ref 0–19)
GLIADIN PEPTIDE IGG SER-ACNC: 2 UNITS (ref 0–19)
IGA SERPL-MCNC: 195 MG/DL (ref 90–386)
TTG IGA SER-ACNC: <2 U/ML (ref 0–3)
TTG IGG SER-ACNC: <2 U/ML (ref 0–5)

## 2023-08-18 ENCOUNTER — OFFICE VISIT (OUTPATIENT)
Dept: FAMILY MEDICINE CLINIC | Facility: CLINIC | Age: 28
End: 2023-08-18

## 2023-08-18 VITALS
SYSTOLIC BLOOD PRESSURE: 113 MMHG | HEIGHT: 71 IN | TEMPERATURE: 98.3 F | RESPIRATION RATE: 18 BRPM | BODY MASS INDEX: 44.1 KG/M2 | OXYGEN SATURATION: 97 % | WEIGHT: 315 LBS | DIASTOLIC BLOOD PRESSURE: 65 MMHG | HEART RATE: 89 BPM

## 2023-08-18 DIAGNOSIS — E11.9 TYPE 2 DIABETES MELLITUS WITHOUT COMPLICATION, WITHOUT LONG-TERM CURRENT USE OF INSULIN (HCC): ICD-10-CM

## 2023-08-18 DIAGNOSIS — R10.84 GENERALIZED ABDOMINAL PAIN: Primary | ICD-10-CM

## 2023-08-18 DIAGNOSIS — M25.551 PAIN OF RIGHT HIP: ICD-10-CM

## 2023-08-18 PROCEDURE — 99213 OFFICE O/P EST LOW 20 MIN: CPT | Performed by: FAMILY MEDICINE

## 2023-08-18 NOTE — PROGRESS NOTES
Name: Javier Evans      : 1995      MRN: [id number]  Encounter Provider: Blue Bal MD  Encounter Date: 2023   Encounter department: [address]     1. Generalized abdominal pain  Assessment & Plan:  Patient complains of persistent abdominal pain that is associated with early satiety and feelings of abdominal bloating/discomfort. He is also noted to his bowel habits have changed significantly since the onset of symptoms. He reports not having a "full" bowel movement in some time despite increasing his fiber intake and increasing his water intake as well. The early satiety and abdominal bloating are associated with intermittent abdominal pain which can be in multiple locations but is mostly epigastric. He describes the pain as sharp and crampy. He was initially evaluated in the emergency department in the past with an unremarkable work-up. He has been started on both Bentyl and omeprazole with little improvement in his symptoms. He does not think his symptoms are getting worse, but he continues to have issues. - Most concerning of the patient's symptoms are his new onset early satiety, significant abdominal bloating, and change in bowel movement quality/quantity. - The underlying etiology is unclear at this time but his symptoms have been severe and persistent enough to warrant further investigation.  - Ambulatory referral to GI provided    Orders:  -     Ambulatory Referral to Gastroenterology; Future    2. Type 2 diabetes mellitus without complication, without long-term current use of insulin ([address])  Assessment & Plan:  Patient recently had a set of labs done including hemoglobin A1c. A1c diagnostic of diabetes with a value of 7.2%. Unfortunately, patient was unaware of this diagnosis until today. We discussed potential plans going forward.   He would like to address his weight first and attempting nonpharmacological approach at this time. He has set up an appointment with weight management in the near future. I agree with the plan for now, but did inform him that if his A1c continues to be elevated we will need to start a medication to control his blood sugars.  - Recheck A1c in 3 months  - Type 2 diabetes follow-up in 3 months  - Continue to increase physical activity and healthy eating habits with the goal of significant weight loss    Orders:  -     Hemoglobin A1C; Future; Expected date: 11/17/2023    3. Pain of right hip  Assessment & Plan:  Patient has had pain in the right hip for some time but has recently been exacerbated. He requested referral for further evaluation.  - Ambulatory referral to sports medicine provided    Orders:  -     Ambulatory Referral to Sports Medicine; Future         Subjective      Patient is a 43-year-old male presents today for follow-up of generalized abdominal pain occurring over the last several months. Initially he was evaluated in the emergency department when his symptoms were new and he has followed up in our clinic without significant improvement despite treatment. CT scan initially ruled out any acute surgical problems in 2 different types medications have been prescribed. He continues to complain of diffuse abdominal pain with associated bloating, early satiety, and occasional crampy or colicky abdominal pain. He has not been able to eat a full meal in some time as symptoms begin soon after eating. He denies any fevers over this time, vomiting, hematemesis, melena, hematochezia. He did report that the symptoms seemed to begin after eating a particular meal that made both he and his wife ill. Review of Systems   Constitutional: Negative for chills, diaphoresis and fever. HENT: Negative. Respiratory: Negative. Cardiovascular: Negative. Gastrointestinal: Positive for abdominal distention, abdominal pain and nausea.  Negative for blood in stool, constipation and vomiting. Genitourinary: Negative. Musculoskeletal: Positive for back pain (chronic). Skin: Negative for rash. Neurological: Negative. Psychiatric/Behavioral: Negative. Current Outpatient Medications on File Prior to Visit   Medication Sig   • dicyclomine (BENTYL) 10 mg capsule TAKE 1 CAPSULE BY MOUTH 4 TIMES A DAY (BEFORE MEALS AND AT BEDTIME)   • methocarbamol (ROBAXIN) 500 mg tablet Take 1 tablet (500 mg total) by mouth 4 (four) times a day   • omeprazole (PriLOSEC) 20 mg delayed release capsule Take 1 capsule (20 mg total) by mouth daily before breakfast   • diazepam (VALIUM) 5 mg tablet Take 2 tablets (10 mg total) by mouth every 8 (eight) hours as needed for muscle spasms (Patient not taking: Reported on 8/4/2023)   • famciclovir (FAMVIR) 500 mg tablet Take 1 tablet (500 mg total) by mouth 3 (three) times a day for 5 days (Patient not taking: Reported on 7/25/2023)       Objective     /65 (BP Location: Left arm, Patient Position: Sitting, Cuff Size: Large)   Pulse 89   Temp 98.3 °F (36.8 °C) (Temporal)   Resp 18   Ht 5' 11" (1.803 m)   Wt (!) 194 kg (426 lb 12.8 oz)   SpO2 97%   BMI 59.53 kg/m²     Physical Exam  Vitals reviewed. Constitutional:       Appearance: Normal appearance. HENT:      Head: Normocephalic and atraumatic. Right Ear: External ear normal.      Left Ear: External ear normal.      Nose: Nose normal. No rhinorrhea. Mouth/Throat:      Mouth: Mucous membranes are moist.      Pharynx: Oropharynx is clear. No posterior oropharyngeal erythema. Eyes:      Extraocular Movements: Extraocular movements intact. Cardiovascular:      Rate and Rhythm: Normal rate and regular rhythm. Heart sounds: No murmur heard. Pulmonary:      Effort: Pulmonary effort is normal. No respiratory distress. Abdominal:      General: Bowel sounds are normal. There is no distension. Palpations: Abdomen is soft. There is no mass.       Tenderness: There is no abdominal tenderness. There is no guarding. Hernia: No hernia is present. Musculoskeletal:         General: Normal range of motion. Cervical back: Normal range of motion. Right lower leg: No edema. Left lower leg: No edema. Skin:     General: Skin is warm and dry. Findings: No rash. Neurological:      General: No focal deficit present. Mental Status: He is alert and oriented to person, place, and time.    Psychiatric:         Mood and Affect: Mood normal.         Behavior: Behavior normal.       Jody Vyas MD

## 2023-08-18 NOTE — ASSESSMENT & PLAN NOTE
Patient complains of persistent abdominal pain that is associated with early satiety and feelings of abdominal bloating/discomfort. He is also noted to his bowel habits have changed significantly since the onset of symptoms. He reports not having a "full" bowel movement in some time despite increasing his fiber intake and increasing his water intake as well. The early satiety and abdominal bloating are associated with intermittent abdominal pain which can be in multiple locations but is mostly epigastric. He describes the pain as sharp and crampy. He was initially evaluated in the emergency department in the past with an unremarkable work-up. He has been started on both Bentyl and omeprazole with little improvement in his symptoms. He does not think his symptoms are getting worse, but he continues to have issues. - Most concerning of the patient's symptoms are his new onset early satiety, significant abdominal bloating, and change in bowel movement quality/quantity.     - The underlying etiology is unclear at this time but his symptoms have been severe and persistent enough to warrant further investigation.  - Ambulatory referral to GI provided

## 2023-08-18 NOTE — ASSESSMENT & PLAN NOTE
Patient has had pain in the right hip for some time but has recently been exacerbated.   He requested referral for further evaluation.  - Ambulatory referral to sports medicine provided

## 2023-08-18 NOTE — ASSESSMENT & PLAN NOTE
Patient recently had a set of labs done including hemoglobin A1c. A1c diagnostic of diabetes with a value of 7.2%. Unfortunately, patient was unaware of this diagnosis until today. We discussed potential plans going forward. He would like to address his weight first and attempting nonpharmacological approach at this time. He has set up an appointment with weight management in the near future.   I agree with the plan for now, but did inform him that if his A1c continues to be elevated we will need to start a medication to control his blood sugars.  - Recheck A1c in 3 months  - Type 2 diabetes follow-up in 3 months  - Continue to increase physical activity and healthy eating habits with the goal of significant weight loss

## 2023-09-04 DIAGNOSIS — R10.13 EPIGASTRIC PAIN: ICD-10-CM

## 2023-09-05 RX ORDER — OMEPRAZOLE 20 MG/1
20 CAPSULE, DELAYED RELEASE ORAL
Qty: 90 CAPSULE | Refills: 1 | Status: SHIPPED | OUTPATIENT
Start: 2023-09-05 | End: 2023-09-13

## 2023-09-07 ENCOUNTER — OFFICE VISIT (OUTPATIENT)
Dept: OBGYN CLINIC | Facility: OTHER | Age: 28
End: 2023-09-07
Payer: COMMERCIAL

## 2023-09-07 VITALS
WEIGHT: 315 LBS | HEIGHT: 71 IN | SYSTOLIC BLOOD PRESSURE: 106 MMHG | HEART RATE: 91 BPM | DIASTOLIC BLOOD PRESSURE: 79 MMHG | BODY MASS INDEX: 44.1 KG/M2

## 2023-09-07 DIAGNOSIS — M54.41 CHRONIC RIGHT-SIDED LOW BACK PAIN WITH RIGHT-SIDED SCIATICA: Primary | ICD-10-CM

## 2023-09-07 DIAGNOSIS — M25.551 PAIN OF RIGHT HIP: ICD-10-CM

## 2023-09-07 DIAGNOSIS — G89.29 CHRONIC RIGHT-SIDED LOW BACK PAIN WITH RIGHT-SIDED SCIATICA: Primary | ICD-10-CM

## 2023-09-07 PROCEDURE — 99214 OFFICE O/P EST MOD 30 MIN: CPT | Performed by: FAMILY MEDICINE

## 2023-09-07 RX ORDER — NAPROXEN SODIUM 220 MG
220 TABLET ORAL AS NEEDED
COMMUNITY

## 2023-09-07 NOTE — PROGRESS NOTES
1. Chronic right-sided low back pain with right-sided sciatica  MRI lumbar spine wo contrast    Ambulatory referral to Spine & Pain Management      2. Pain of right hip  Ambulatory Referral to Sports Medicine    XR hip/pelv 2-3 vws right if performed        Orders Placed This Encounter   Procedures   • XR hip/pelv 2-3 vws right if performed   • MRI lumbar spine wo contrast   • Ambulatory referral to Spine & Pain Management        IMAGING STUDIES: (I personally reviewed images in PACS and report):         PAST REPORTS:  CT Lumbar spine 9/7/22:  1. Straightening of the lumbar lordosis is chronic and could suggest of muscle spasm as a contributor to the patient's chronic pain. No acute findings, though.     2.  Mild spondylosis at L4-5 and L5-S1 with mild right foraminal stenoses. No areas of high-grade neural impingement.     3. Hepatic steatosis. ASSESSMENT/PLAN:  Right Low Back Pain Sciatica  PMH HNP per patient    Repeat X-ray next visit: None    Return for Follow-up with specialist.    Patient instructions below verbally summarized in person during encounter: There are no Patient Instructions on file for this visit.      __________________________________________________________________________    HISTORY OF PRESENT ILLNESS:  Patient here for evaluation of low back lumbar pain after injury slipping and falling landing on couch in room and had severe right sided radicular pain in 1 year ago. Patient attemtped therapy and chiropractic but still has persistent pain. Recently told he may have si joint discomfort.      Pain Scale: 10/10 July 2023, 7/10  Radiation Down Leg: yes to foot  LE Parasthesias: yes initially    Physical Therapy: yes 6+ weeks    Denies any saddle anesthesia, new onset bowel/bladder incontinence, fevers, personal history of cancer, unintentional weight loss, weakness              Review of Systems      Following history reviewed and update:    Past Medical History:   Diagnosis Date   • Back pain    • Testicle pain      Past Surgical History:   Procedure Laterality Date   • FL INJECTION RIGHT SHOULDER (ARTHROGRAM)  2/6/2023     Social History   Social History     Substance and Sexual Activity   Alcohol Use Yes    Comment: socially     Social History     Substance and Sexual Activity   Drug Use No     Social History     Tobacco Use   Smoking Status Never   Smokeless Tobacco Never     Family History   Problem Relation Age of Onset   • Diabetes Mother    • Cancer Mother    • Heart attack Mother    • Snoring Father         suspected MYRIAM   • Stroke Father    • Cancer Father      Allergies   Allergen Reactions   • Shellfish Allergy - Food Allergy Anaphylaxis   • Other      Seasonal  pollen          Physical Exam  /79 (BP Location: Left arm, Patient Position: Sitting, Cuff Size: Adult)   Pulse 91   Ht 5' 11" (1.803 m)   Wt (!) 194 kg (426 lb 9.6 oz)   BMI 59.50 kg/m²     Constitutional:  see vital signs  Gen: well-developed, normocephalic/atraumatic, well-groomed  Eyes: No inflammation or discharge of conjunctiva or lids; sclera clear   Pharynx: no inflammation, lesion, or mass of lips  Neck: supple, no masses, non-distended  MSK: no inflammation, lesion, mass, or clubbing of nails and digits except for other than mentioned below  SKIN: no visible rashes or skin lesions  Pulmonary/Chest: Effort normal. No respiratory distress.    NEURO: cranial nerves grossly intact  PSYCH:  Alert and oriented to person, place, and time; recent and remote memory intact; mood normal, no depression, anxiety, or agitation, judgment and insight good and intact     Ortho Exam   BACK EXAM:  BACK TENDERNESS:  Spinous Processes: no  Paraspinal Muscles: no    DERMATOMAL SENSATION:  L1: normal   L2: normal   L3: normal   L4: normal   L5: normal   S1: normal    STRENGTH (bilateral):  Knee Extension: 5/5  Foot Dorsiflexion: 5/5  Great Toe Extension: 5/5  Foot Plantarflexion: 5/5  Hip Flexion: 5/5  Hip Abduction: 5/5    BACK: SUPINE STRAIGHT LEG: negative  PRONE STRAIGHT LEG:  SLUMP:     RIGHT HIP:  LOG ROLL: negative  MIGUEL: negative for groin pain but +latearl hip and buttock pain  FADIR: negative      __________________________________________________________________________  Procedures

## 2023-09-13 ENCOUNTER — CONSULT (OUTPATIENT)
Dept: GASTROENTEROLOGY | Facility: CLINIC | Age: 28
End: 2023-09-13
Payer: COMMERCIAL

## 2023-09-13 VITALS
DIASTOLIC BLOOD PRESSURE: 84 MMHG | SYSTOLIC BLOOD PRESSURE: 122 MMHG | WEIGHT: 315 LBS | TEMPERATURE: 99.1 F | BODY MASS INDEX: 44.1 KG/M2 | HEIGHT: 71 IN

## 2023-09-13 DIAGNOSIS — R10.84 GENERALIZED ABDOMINAL PAIN: ICD-10-CM

## 2023-09-13 PROCEDURE — 99243 OFF/OP CNSLTJ NEW/EST LOW 30: CPT | Performed by: INTERNAL MEDICINE

## 2023-09-13 RX ORDER — OMEPRAZOLE 40 MG/1
40 CAPSULE, DELAYED RELEASE ORAL 2 TIMES DAILY
Qty: 60 CAPSULE | Refills: 2 | Status: SHIPPED | OUTPATIENT
Start: 2023-09-13 | End: 2023-12-12

## 2023-09-13 NOTE — PROGRESS NOTES
[facility] - Outpatient Consultation  Harvey Gee 33 y.o. male MRN: [id number]  Encounter: [id number]          ASSESSMENT AND PLAN:    Harvey Gee is a 33 y.o. male with PMH of back pain, leg paresthesias, chronic abdominal pain presenting for abdominal pain. 1. Generalized abdominal pain    -Symptoms seem consistent with gastritis at this time, however given NSAID use differential could include peptic ulcer disease  -Omeprazole 40 BID  -Follow-up after treatment with omeprazole as above, if no relief would consider upper endoscopy to rule out PUD or HIDA scan given gallbladder contraction  -Counseled to avoid NSAID use    2. Fatty liver disease  - Outpt f/u    No orders of the defined types were placed in this encounter.    ______________________________________________________________________    HPI:    Harvey Gee is a 33 y.o. male with PMH of back pain, leg paresthesias, chronic abdominal pain presenting for abdominal pain. Celiac panel negative, H. Pylori breath test negative. Has had episodes of severe abdominal pain for several months, 6 out of 10 in severity at worst.  Sometimes wakes him up from sleep. No associated nausea or emesis at this time. Normal bowel movements. No unintentional weight loss. Endorses naproxen use for his chronic back pain. During ED visit for pain, had CTAP with contrast (7/13/2023) showing enlarged fatty liver, no acute inflammatory process in abdomen or pelvis, gallbladder contraction. REVIEW OF SYSTEMS:    CONSTITUTIONAL: Denies any fever, chills, rigors, and weight loss. HEENT: No earache or tinnitus. Denies hearing loss or visual disturbances. CARDIOVASCULAR: No chest pain or palpitations. RESPIRATORY: Denies any cough, hemoptysis, shortness of breath or dyspnea on exertion. GASTROINTESTINAL: As noted in the History of Present Illness. GENITOURINARY: No problems with urination.  Denies any hematuria or dysuria. NEUROLOGIC: No dizziness or vertigo, denies headaches. MUSCULOSKELETAL: Denies any muscle or joint pain. SKIN: Denies skin rashes or itching. ENDOCRINE: Denies excessive thirst. Denies intolerance to heat or cold. PSYCHOSOCIAL: Denies depression or anxiety. Denies any recent memory loss. Historical Information   Past Medical History:   Diagnosis Date   • Back pain    • Testicle pain      Past Surgical History:   Procedure Laterality Date   • FL INJECTION RIGHT SHOULDER (ARTHROGRAM)  2/6/2023     Social History   Social History     Substance and Sexual Activity   Alcohol Use Yes    Comment: socially     Social History     Substance and Sexual Activity   Drug Use No     Social History     Tobacco Use   Smoking Status Never   Smokeless Tobacco Never     Family History   Problem Relation Age of Onset   • Diabetes Mother    • Cancer Mother    • Heart attack Mother    • Snoring Father         suspected MYRIAM   • Stroke Father    • Cancer Father        Meds/Allergies       Current Outpatient Medications:   •  diazepam (VALIUM) 5 mg tablet  •  dicyclomine (BENTYL) 10 mg capsule  •  famciclovir (FAMVIR) 500 mg tablet  •  methocarbamol (ROBAXIN) 500 mg tablet  •  naproxen sodium (ALEVE) 220 MG tablet  •  omeprazole (PriLOSEC) 20 mg delayed release capsule    Allergies   Allergen Reactions   • Shellfish Allergy - Food Allergy Anaphylaxis   • Other      Seasonal  pollen           Objective     There were no vitals taken for this visit. There is no height or weight on file to calculate BMI. Wt Readings from Last 3 Encounters:   09/07/23 (!) 194 kg (426 lb 9.6 oz)   08/18/23 (!) 194 kg (426 lb 12.8 oz)   08/04/23 (!) 192 kg (422 lb 3.2 oz)        PHYSICAL EXAM:      General Appearance:   Alert, cooperative, no distress   HEENT:   Normocephalic, atraumatic, anicteric.      Neck:  Supple, symmetrical, trachea midline   Lungs:   Clear to auscultation bilaterally; no rales, rhonchi or wheezing; respirations unlabored    Heart[de-identified]   Regular rate and rhythm; no murmur, rub, or gallop. Abdomen:   Soft, non-tender, non-distended; normal bowel sounds; no masses, no organomegaly    Genitalia:   Deferred    Rectal:   Deferred    Extremities:  No cyanosis, clubbing or edema    Pulses:  2+ and symmetric    Skin:  No jaundice, rashes, or lesions    Lymph nodes:  No palpable cervical lymphadenopathy        Lab Results:         Lab Units 07/29/23  1521 07/13/23  1741 01/18/22  1516   SODIUM mmol/L 138 136 138   POTASSIUM mmol/L 4.1 4.0 4.1   CHLORIDE mmol/L 111* 108 107   CO2 mmol/L 24 22 25   BUN mg/dL 13 14 12   CREATININE mg/dL 0.79 0.84 0.90   GLUCOSE RANDOM mg/dL 105 162* 113   CALCIUM mg/dL 9.5 9.3 9.2            Lab Units 07/13/23  1741 01/18/22  1516   TOTAL PROTEIN g/dL 8.2 8.3*   ALBUMIN g/dL 3.9 4.2   TOTAL BILIRUBIN mg/dL 0.32 0.29   AST U/L 54* 35   ALT U/L 118* 90*   ALK PHOS U/L 76 85           Lab Units 08/16/23  1644 07/13/23  1741 01/18/22  1516   WBC Thousand/uL 8.41 9.51 8.85   HEMOGLOBIN g/dL 14.7 15.0 15.3   HEMATOCRIT % 45.5 45.2 46.7   PLATELETS Thousands/uL 233 236 262   MCV fL 90 89 90   MCH pg 29.2 29.5 29.6   MCHC g/dL 32.3 33.2 32.8   RDW % 13.2 12.8 13.0   MPV fL 10.5 9.8 10.0   NRBC AUTO /100 WBCs 0 0 0   NEUTROS PCT % 62 62 62   IMMATURE GRANULOCYTES % % 0 1 0   LYMPHS PCT % 29 29 28   MONOS PCT % 5 6 7   EOS PCT % 3 2 2   BASOS PCT % 1 0 1   NEUTROS ABS Thousands/µL 5.26 5.87 5.54   IMMATURE GRANULOCYES ABS Thousand/uL 0.02 0.05 0.02   LYMPHS ABS Thousands/µL 2.42 2.77 2.49   MONOS ABS Thousand/µL 0.45 0.57 0.62   EOS ABS Thousand/µL 0.22 0.22 0.14   BASOS ABS Thousands/µL 0.04 0.03 0.04       No results for input(s): "IRON", "TRANSFERRIN", "TRANSFERSAT", "FERRITIN", "RETIC" in the last [id number] hours. No results found for: "CRP"    Lab Results   Component Value Date    PWD1QKSGGCWL 2.720 01/18/2022       Radiology Results:   No results found.     Wyatt Olivera MD  PGY-5 Gastroenterology Fellow  9/13/2023 3:43 PM

## 2023-09-16 ENCOUNTER — HOSPITAL ENCOUNTER (OUTPATIENT)
Dept: MRI IMAGING | Facility: HOSPITAL | Age: 28
Discharge: HOME | End: 2023-09-16
Payer: COMMERCIAL

## 2023-09-16 DIAGNOSIS — M54.41 CHRONIC RIGHT-SIDED LOW BACK PAIN WITH RIGHT-SIDED SCIATICA: ICD-10-CM

## 2023-09-16 DIAGNOSIS — G89.29 CHRONIC RIGHT-SIDED LOW BACK PAIN WITH RIGHT-SIDED SCIATICA: ICD-10-CM

## 2023-09-16 PROCEDURE — 72148 MRI LUMBAR SPINE W/O DYE: CPT

## 2023-09-16 PROCEDURE — G1004 CDSM NDSC: HCPCS

## 2023-09-19 ENCOUNTER — TELEPHONE (OUTPATIENT)
Age: 28
End: 2023-09-19

## 2023-09-19 NOTE — TELEPHONE ENCOUNTER
Caller: [facility]    Doctor: Darline    Reason for call: significant findings of the MRI of lumbar spine    Call back#: [phone]

## 2023-12-01 ENCOUNTER — PATIENT OUTREACH (OUTPATIENT)
Dept: BARIATRICS | Facility: CLINIC | Age: 28
End: 2023-12-01

## 2023-12-01 NOTE — PROGRESS NOTES
Relevant Medical History: Medical History: DM and MYRIAM  Relevant Labs: 8/16/23: , HDL 26, A1C 7.2%  Relevant Medications: prilosec    Weight History     Self Reported Current Wt:  440 lbs   Self Reported Current Ht: 6'1"  Highest Weight: 500 lbs  Lowest Weight: mid 300's  BMI: BMI >40    Past Attempts at Weight Loss: Self Created Diet and Exercise, keto    Food Recall  Breakfast: skip  Snack: skip  Lunch:  12:30 OR skip: leftovers from dinner   Snack: skip   Dinner: 7:30: chicken/ground turkey, noodles/potatoes/rice, veggies   Snack: skip OR ice cream OR pastries    Frequency of Dining Out: 1x/wk  Beverages: water, juice/soda occasionally  Volume of Beverage Intake: feels water intake is good    Would the patient benefit from visit with [facility] specialist?: Stress Eating, clean plate mentality        Physical Activity Intake  Activity: Walking  Frequency of Exercise: around the neighborhood 5x/wk  Physical Limitations to Exercise: n/a    Sleep Quality  Wake time: 6:00  Bed:  11-2  Average hours of sleep per night?: varies  Is sleep refreshing?: No  Any history of sleep apnea?: Yes     Review of Programs  Overview of medical weight management programs provided?: Yes   Is patient interested in discussing medication?: Yes   Is patient interested in discussing bariatric surgery?: No, last resort only  Does patient know which pathway they are interested in?: No

## 2023-12-06 ENCOUNTER — PATIENT MESSAGE (OUTPATIENT)
Dept: BARIATRICS | Facility: CLINIC | Age: 28
End: 2023-12-06

## 2023-12-06 ENCOUNTER — CONSULT (OUTPATIENT)
Dept: BARIATRICS | Facility: CLINIC | Age: 28
End: 2023-12-06
Payer: COMMERCIAL

## 2023-12-06 VITALS
BODY MASS INDEX: 41.75 KG/M2 | HEART RATE: 95 BPM | SYSTOLIC BLOOD PRESSURE: 106 MMHG | WEIGHT: 315 LBS | DIASTOLIC BLOOD PRESSURE: 70 MMHG | HEIGHT: 73 IN

## 2023-12-06 DIAGNOSIS — E11.9 TYPE 2 DIABETES MELLITUS WITHOUT COMPLICATION, WITHOUT LONG-TERM CURRENT USE OF INSULIN (HCC): ICD-10-CM

## 2023-12-06 DIAGNOSIS — G47.33 OSA (OBSTRUCTIVE SLEEP APNEA): ICD-10-CM

## 2023-12-06 DIAGNOSIS — E66.1 CLASS 3 DRUG-INDUCED OBESITY WITH SERIOUS COMORBIDITY AND BODY MASS INDEX (BMI) OF 50.0 TO 59.9 IN ADULT (HCC): ICD-10-CM

## 2023-12-06 DIAGNOSIS — E11.69 DIABETES MELLITUS TYPE 2 IN OBESE: Primary | ICD-10-CM

## 2023-12-06 DIAGNOSIS — E66.9 DIABETES MELLITUS TYPE 2 IN OBESE: Primary | ICD-10-CM

## 2023-12-06 DIAGNOSIS — E66.01 CLASS 3 SEVERE OBESITY DUE TO EXCESS CALORIES WITH SERIOUS COMORBIDITY AND BODY MASS INDEX (BMI) OF 50.0 TO 59.9 IN ADULT (HCC): Primary | ICD-10-CM

## 2023-12-06 PROBLEM — E66.813 CLASS 3 SEVERE OBESITY DUE TO EXCESS CALORIES WITH SERIOUS COMORBIDITY AND BODY MASS INDEX (BMI) OF 50.0 TO 59.9 IN ADULT (HCC): Status: ACTIVE | Noted: 2018-08-14

## 2023-12-06 PROCEDURE — 99244 OFF/OP CNSLTJ NEW/EST MOD 40: CPT | Performed by: NURSE PRACTITIONER

## 2023-12-06 NOTE — ASSESSMENT & PLAN NOTE
Will likely improve with weight loss.   Lab Results   Component Value Date    HGBA1C 7.2 (H) 08/16/2023

## 2023-12-06 NOTE — PROGRESS NOTES
Assessment/Plan:    Class 3 severe obesity due to excess calories with serious comorbidity and body mass index (BMI) of 50.0 to 59.9 in adult [facility])  - Discussed options of HealthyCORE-Intensive Lifestyle Intervention Program, Very Low Calorie Diet-VLCD, Conservative Program, Syed-En-Y Gastric Bypass, and Vertical Sleeve Gastrectomy and the role of weight loss medications.  - Not currently interested in weight loss surgery. - Explained the importance of making lifestyle changes with anti-obesity medications. - Patient is interested in pursuing Conservative Program  - Initial weight loss goal of 5-10% weight loss for improved health  - Weight loss can improve patient's co-morbid conditions and/or prevent weight-related complications. - Struggling with cravings and is interested in starting weight loss medication to help with that. - FDA approved weight loss medications reviewed: Wegovy, Saxenda, Zepbound, Qsymia, Contrave, and Phentermine. KNRQWA and Saxenda shortage discussed. Off label use of medications discussed. - Patient denies personal history of pancreatitis. Patient also denies personal and family history of thyroid cancer and multiple endocrine neoplasia type 2 (MEN 2 tumor). He clarified with his father that he does not have history of thyroid cancer.   - Denies history of kidney stones, gallstones, seizures, or glaucoma. - Given recent diagnosis of diabetes, he will call around to pharmacy to try to find the starting dose of Ozempic. If he is able to find this, he will contact me so that prescription can be submitted. - Labs reviewed: CBC, A1c, and lipid August 16, 2023. CMP July 13, 2023. TSH January 18, 2022. A1c was uncontrolled at 7.2, triglycerides elevated, HDL low, glucose and AST/ALT elevated, which will likely improve with weight loss. Remainder of the blood work was within acceptable range. Goals:  Do not skip meals.   Food log (ie.) www.myONEPLEnesspal.com,[place] Thornepeople. com,loseit.com,GTRAN. com,etc. baritastic (use [employer], dietdoctor. com or smartphone daphne [employer] for recipes)  No sugary beverages. At least 64oz of water daily. Increase physical activity by 10 minutes daily. Gradually increase physical activity to a goal of 5 days per week for 30 minutes of MODERATE intensity PLUS 2 days per week of FULL BODY resistance training (use smartphone apps [employer], Home Workout, etc.)  Start food logging, weighing, and measuring food. 2000 to 2200 verona/day. Sample menu given. Keep up the great work with water intake, at least 64 ounces daily. Gradually increase exercise to goal of 5 days/week for 30 minutes cardiovascular exercise and 2 days/week resistance training. Type 2 diabetes mellitus without complication, without long-term current use of insulin ([address])  Will likely improve with weight loss. Lab Results   Component Value Date    HGBA1C 7.2 (H) 08/16/2023       MYRIAM (obstructive sleep apnea)  Continue regular use of CPAP. Claudeen Dad was seen today for consult. Diagnoses and all orders for this visit:    Class 3 severe obesity due to excess calories with serious comorbidity and body mass index (BMI) of 50.0 to 59.9 in adult [facility])    Class 3 drug-induced obesity with serious comorbidity and body mass index (BMI) of 50.0 to 59.9 in [facility])  -     Ambulatory Referral to Weight Management    MYRIAM (obstructive sleep apnea)  -     Ambulatory Referral to Weight Management    Type 2 diabetes mellitus without complication, without long-term current use of insulin ([facility])        Total time spent: 40 min, with >50% face-to-face time spent counseling patient on nonsurgical interventions for the treatment of excess weight. Discussed in detail nonsurgical options including intensive lifestyle intervention program, very low-calorie diet program and conservative program.  Discussed the role of weight loss medications.   Counseled patient on diet behavior and exercise modification for weight loss. Follow up in approximately  2 month nurse visit and 4 months  with Non-Surgical Physician/Advanced Practitioner. Subjective:   Chief Complaint   Patient presents with    Consult     [facility] CONSULT        Patient ID: Catrachito Metzger  is a 33 y.o. male with excess weight/obesity here to pursue weight management. Previous notes and records have been reviewed. Past Medical History:   Diagnosis Date    Back pain     Testicle pain      Past Surgical History:   Procedure Laterality Date    FL INJECTION RIGHT SHOULDER (ARTHROGRAM)  2/6/2023       HPI:  Wt Readings from Last 20 Encounters:   12/06/23 (!) 192 kg (423 lb 12.8 oz)   09/13/23 (!) 192 kg (423 lb 9.6 oz)   09/07/23 (!) 194 kg (426 lb 9.6 oz)   08/18/23 (!) 194 kg (426 lb 12.8 oz)   08/04/23 (!) 192 kg (422 lb 3.2 oz)   07/25/23 (!) 192 kg (424 lb)   07/11/23 (!) 192 kg (423 lb 3.2 oz)   05/05/23 (!) 190 kg (418 lb)   04/08/23 (!) 191 kg (420 lb)   04/03/23 (!) 192 kg (423 lb 3.2 oz)   03/24/23 (!) 192 kg (423 lb)   02/13/23 (!) 192 kg (424 lb)   02/01/23 (!) 193 kg (424 lb 6.4 oz)   01/06/23 (!) 190 kg (419 lb)   01/04/23 (!) 190 kg (419 lb 9.6 oz)   01/18/22 (!) 185 kg (408 lb 12.8 oz)   12/27/21 (!) 188 kg (414 lb)   11/22/21 (!) 188 kg (414 lb)   06/29/21 (!) 163 kg (360 lb)   02/15/21 (!) 179 kg (393 lb 12.8 oz)     Obesity/Excess Weight:  Severity: Very Severe  Onset:  whole life    Modifiers: Diet and Exercise, Physician Supervised Weight Loss Program, and [facility] Weight Management in the past and did meal replacement - keto diet. Contributing factors: Poor Food Choices, Insufficient Physical Activity, and portion sizes   Associated symptoms: increased joint pain    Struggling with cravings. Recently diagnosed with diabetes.      Hydration: 80 oz water  Alcohol: special occasions   Smoking: denies  Occupation: [employer]    Sleep: 3-5 hours  STOP bang: Has MYRIAM, uses CPAP    Current weight: 423.8 lbs BMI 56.69  Goal weight: low 300 lbs    Colonoscopy: N/A      I reviewed the following information: weight history, food recall, physical activity intake, and review of programs obtained by Abhinav Yeager RD on 12/1/2023.      Weight History      Self Reported Current Wt:  440 lbs   Self Reported Current Ht: 6'1"  Highest Weight: 500 lbs  Lowest Weight: mid 300's  BMI: BMI >40     Past Attempts at Weight Loss: Self Created Diet and Exercise, keto     Food Recall  Breakfast: skip  Snack: skip  Lunch:  12:30 OR skip: leftovers from dinner   Snack: skip   Dinner: 7:30: chicken/ground turkey, noodles/potatoes/rice, veggies   Snack: skip OR ice cream OR pastries     Frequency of Dining Out: 1x/wk  Beverages: water, juice/soda occasionally  Volume of Beverage Intake: feels water intake is good     Would the patient benefit from visit with [facility] specialist?: Stress Eating, clean plate mentality         Physical Activity Intake  Activity: Walking  Frequency of Exercise: around the neighborhood 5x/wk  Physical Limitations to Exercise: n/a     Sleep Quality  Wake time: 6:00  Bed:  11-2  Average hours of sleep per night?: varies  Is sleep refreshing?: No  Any history of sleep apnea?: Yes      Review of Programs  Overview of medical weight management programs provided?: Yes   Is patient interested in discussing medication?: Yes   Is patient interested in discussing bariatric surgery?: No, last resort only  Does patient know which pathway they are interested in?: No       The following portions of the patient's history were reviewed and updated as appropriate: allergies, current medications, past family history, past medical history, past social history, past surgical history, and problem list.    Family History   Problem Relation Age of Onset    Diabetes Mother     Cancer Mother         brain    Heart attack Mother     Snoring Father         suspected MYRIAM    Stroke Father Cancer Father         colon and throat    Breast cancer Family         Review of Systems   HENT:  Negative for sore throat. Respiratory:  Negative for cough and shortness of breath. Cardiovascular:  Negative for chest pain and palpitations. Gastrointestinal:  Negative for abdominal pain, constipation, diarrhea, nausea and vomiting. Denies GERD   Endocrine: Positive for heat intolerance. Negative for cold intolerance. Genitourinary:  Negative for dysuria. Musculoskeletal:  Positive for arthralgias (chronic) and back pain (chronic). Skin:  Negative for rash. Neurological:  Negative for headaches. Psychiatric/Behavioral:  Negative for suicidal ideas (or HI). Denies depression   + anxiety situational        Objective:  /70   Pulse 95   Ht 6' 0.5" (1.842 m)   Wt (!) 192 kg (423 lb 12.8 oz)   BMI 56.69 kg/m²     Physical Exam  Vitals and nursing note reviewed. Constitutional   General appearance: Abnormal.  well developed and morbidly obese. Eyes No conjunctival injection. Ears, Nose, Mouth, and Throat Oral mucosa moist.   Pulmonary   Respiratory effort: No increased work of breathing or signs of respiratory distress. Cardiovascular    Examination of extremities for edema and/or varicosities: Normal.  no edema. Abdomen   Abdomen: Abnormal.  The abdomen was obese.     Musculoskeletal   Normal range of motion  Neurological   Gait and station: Normal.   Psychiatric   Orientation to person, place and time: Normal.    Affect: appropriate

## 2023-12-06 NOTE — ASSESSMENT & PLAN NOTE
- Discussed options of HealthyCORE-Intensive Lifestyle Intervention Program, Very Low Calorie Diet-VLCD, Conservative Program, Syed-En-Y Gastric Bypass, and Vertical Sleeve Gastrectomy and the role of weight loss medications.  - Not currently interested in weight loss surgery. - Explained the importance of making lifestyle changes with anti-obesity medications. - Patient is interested in pursuing Conservative Program  - Initial weight loss goal of 5-10% weight loss for improved health  - Weight loss can improve patient's co-morbid conditions and/or prevent weight-related complications. - Struggling with cravings and is interested in starting weight loss medication to help with that. - FDA approved weight loss medications reviewed: Wegovy, Saxenda, Zepbound, Qsymia, Contrave, and Phentermine. BPMTKJ and Saxenda shortage discussed. Off label use of medications discussed. - Patient denies personal history of pancreatitis. Patient also denies personal and family history of thyroid cancer and multiple endocrine neoplasia type 2 (MEN 2 tumor). He clarified with his father that he does not have history of thyroid cancer.   - Denies history of kidney stones, gallstones, seizures, or glaucoma. - Given recent diagnosis of diabetes, he will call around to pharmacy to try to find the starting dose of Ozempic. If he is able to find this, he will contact me so that prescription can be submitted. - Labs reviewed: CBC, A1c, and lipid August 16, 2023. CMP July 13, 2023. TSH January 18, 2022. A1c was uncontrolled at 7.2, triglycerides elevated, HDL low, glucose and AST/ALT elevated, which will likely improve with weight loss. Remainder of the blood work was within acceptable range. Goals:  Do not skip meals. Food log (ie.) www.[employer].com,betNOW,Technoridesit.com,ConnectSoft. com,etc. baritastic (use [employer], dietdoctor. com or smartphone daphne [employer] for recipes)  No sugary beverages.  At least 64oz of water daily. Increase physical activity by 10 minutes daily. Gradually increase physical activity to a goal of 5 days per week for 30 minutes of MODERATE intensity PLUS 2 days per week of FULL BODY resistance training (use smartphone apps [employer], Home Workout, etc.)  Start food logging, weighing, and measuring food. 2000 to 2200 verona/day. Sample menu given. Keep up the great work with water intake, at least 64 ounces daily. Gradually increase exercise to goal of 5 days/week for 30 minutes cardiovascular exercise and 2 days/week resistance training.

## 2023-12-08 ENCOUNTER — PATIENT MESSAGE (OUTPATIENT)
Dept: BARIATRICS | Facility: CLINIC | Age: 28
End: 2023-12-08

## 2023-12-13 ENCOUNTER — OFFICE VISIT (OUTPATIENT)
Dept: GASTROENTEROLOGY | Facility: CLINIC | Age: 28
End: 2023-12-13
Payer: COMMERCIAL

## 2023-12-13 ENCOUNTER — TELEPHONE (OUTPATIENT)
Dept: BARIATRICS | Facility: CLINIC | Age: 28
End: 2023-12-13

## 2023-12-13 VITALS
SYSTOLIC BLOOD PRESSURE: 118 MMHG | TEMPERATURE: 98.5 F | BODY MASS INDEX: 41.75 KG/M2 | DIASTOLIC BLOOD PRESSURE: 76 MMHG | WEIGHT: 315 LBS | HEIGHT: 73 IN

## 2023-12-13 DIAGNOSIS — R10.84 GENERALIZED ABDOMINAL PAIN: Primary | ICD-10-CM

## 2023-12-13 DIAGNOSIS — K76.0 FATTY LIVER DISEASE, NONALCOHOLIC: ICD-10-CM

## 2023-12-13 PROCEDURE — 99213 OFFICE O/P EST LOW 20 MIN: CPT | Performed by: INTERNAL MEDICINE

## 2023-12-13 NOTE — PROGRESS NOTES
[facility] - Outpatient Follow-up Note  Hugo Hanna 28 y.o. male MRN: [id number]  Encounter: [id number]          ASSESSMENT AND PLAN:    Hugo Hanna is a 28 y.o. male with hx of back pain, leg paresthesia,s chronic abdominal pain presenting for f/u for abdominal pain.     Abdominal Pain  - Pain has resolved after brief treatment course of PPI   - Avoid NSAIDs  - F/u in clinic as needed    Fatty Liver Disease  - Counseled pt on importance of weight loss  - F/u w weight management team    No diagnosis found.    No orders of the defined types were placed in this encounter.    ______________________________________________________________________    SUBJECTIVE:    Hugo Hanna is a 28 y.o. male with hx of back pain, leg paresthesia,s chronic abdominal pain presenting for f/u for abdominal pain. Last seen in clinic 9/13/23, at that time started on omeprazole.     Since last visit, has been doing well and has no further symptoms. Has not taken the omeprazole for several weeks, and despite that sx have not recurred.     For weight loss and fatty liver disease has started following w bariatric surgery team.         REVIEW OF SYSTEMS IS OTHERWISE NEGATIVE.      Historical Information   Past Medical History:   Diagnosis Date    Back pain     Testicle pain      Past Surgical History:   Procedure Laterality Date    FL INJECTION RIGHT SHOULDER (ARTHROGRAM)  2/6/2023     Social History   Social History     Substance and Sexual Activity   Alcohol Use Yes    Comment: socially     Social History     Substance and Sexual Activity   Drug Use No     Social History     Tobacco Use   Smoking Status Never   Smokeless Tobacco Never     Family History   Problem Relation Age of Onset    Diabetes Mother     Cancer Mother         brain    Heart attack Mother     Snoring Father         suspected YMRIAM    Stroke Father     Cancer Father         colon and throat    Breast cancer Family   "      Meds/Allergies       Current Outpatient Medications:     naproxen sodium (ALEVE) 220 MG tablet    semaglutide, 0.25 or 0.5 mg/dose, (Ozempic, 0.25 or 0.5 MG/DOSE,) 2 mg/3 mL injection pen    Allergies   Allergen Reactions    Shellfish Allergy - Food Allergy Anaphylaxis    Other      Seasonal  pollen           Objective     Blood pressure 118/76, temperature 98.5 °F (36.9 °C), temperature source Tympanic, height 6' 0.5\" (1.842 m), weight (!) 191 kg (420 lb 12.8 oz).   Body mass index is 56.29 kg/m².  Wt Readings from Last 3 Encounters:   12/13/23 (!) 191 kg (420 lb 12.8 oz)   12/06/23 (!) 192 kg (423 lb 12.8 oz)   09/13/23 (!) 192 kg (423 lb 9.6 oz)        PHYSICAL EXAM:      General Appearance:   Alert, cooperative, no distress   HEENT:   Normocephalic, atraumatic, anicteric.     Neck:  Supple, symmetrical, trachea midline   Lungs:   Clear to auscultation bilaterally; no rales, rhonchi or wheezing; respirations unlabored    Heart::   Regular rate and rhythm; no murmur, rub, or gallop.   Abdomen:   Soft, non-tender, non-distended; normal bowel sounds; no masses, no organomegaly    Genitalia:   Deferred    Rectal:   Deferred    Extremities:  No cyanosis, clubbing or edema    Pulses:  2+ and symmetric    Skin:  No jaundice, rashes, or lesions    Lymph nodes:  No palpable cervical lymphadenopathy        Lab Results:       Lab Units 07/29/23  1521 07/13/23  1741 01/18/22  1516   SODIUM mmol/L 138 136 138   POTASSIUM mmol/L 4.1 4.0 4.1   CHLORIDE mmol/L 111* 108 107   CO2 mmol/L 24 22 25   BUN mg/dL 13 14 12   CREATININE mg/dL 0.79 0.84 0.90   GLUCOSE RANDOM mg/dL 105 162* 113   CALCIUM mg/dL 9.5 9.3 9.2            Lab Units 07/13/23  1741 01/18/22  1516   TOTAL PROTEIN g/dL 8.2 8.3*   ALBUMIN g/dL 3.9 4.2   TOTAL BILIRUBIN mg/dL 0.32 0.29   AST U/L 54* 35   ALT U/L 118* 90*   ALK PHOS U/L 76 85           Lab Units 08/16/23  1644 07/13/23  1741 01/18/22  1516   WBC Thousand/uL 8.41 9.51 8.85   HEMOGLOBIN g/dL 14.7 " "15.0 15.3   HEMATOCRIT % 45.5 45.2 46.7   PLATELETS Thousands/uL 233 236 262   MCV fL 90 89 90   MCH pg 29.2 29.5 29.6   MCHC g/dL 32.3 33.2 32.8   RDW % 13.2 12.8 13.0   MPV fL 10.5 9.8 10.0   NRBC AUTO /100 WBCs 0 0 0   NEUTROS PCT % 62 62 62   IMMATURE GRANULOCYTES % % 0 1 0   LYMPHS PCT % 29 29 28   MONOS PCT % 5 6 7   EOS PCT % 3 2 2   BASOS PCT % 1 0 1   NEUTROS ABS Thousands/µL 5.26 5.87 5.54   IMMATURE GRANULOCYES ABS Thousand/uL 0.02 0.05 0.02   LYMPHS ABS Thousands/µL 2.42 2.77 2.49   MONOS ABS Thousand/µL 0.45 0.57 0.62   EOS ABS Thousand/µL 0.22 0.22 0.14   BASOS ABS Thousands/µL 0.04 0.03 0.04       No results for input(s): \"IRON\", \"TRANSFERRIN\", \"TRANSFERSAT\", \"FERRITIN\", \"RETIC\" in the last [id number] hours.    No results found for: \"CRP\"    Lab Results   Component Value Date    VYH5QMSMSYBK 2.720 01/18/2022       Radiology Results:   No results found.    Byron Johnson MD  PGY-5 Gastroenterology Fellow  12/13/2023 10:52 AM    "

## 2023-12-14 ENCOUNTER — PATIENT MESSAGE (OUTPATIENT)
Dept: BARIATRICS | Facility: CLINIC | Age: 28
End: 2023-12-14

## 2023-12-14 DIAGNOSIS — E66.9 DIABETES MELLITUS TYPE 2 IN OBESE: Primary | ICD-10-CM

## 2023-12-14 DIAGNOSIS — Z79.899 MEDICATION MANAGEMENT: ICD-10-CM

## 2023-12-14 DIAGNOSIS — E11.69 DIABETES MELLITUS TYPE 2 IN OBESE: Primary | ICD-10-CM

## 2023-12-14 RX ORDER — METFORMIN HYDROCHLORIDE 750 MG/1
750 TABLET, EXTENDED RELEASE ORAL
Qty: 30 TABLET | Refills: 3 | Status: SHIPPED | OUTPATIENT
Start: 2023-12-14

## 2023-12-15 ENCOUNTER — TELEMEDICINE (OUTPATIENT)
Dept: SLEEP CENTER | Facility: CLINIC | Age: 28
End: 2023-12-15
Payer: COMMERCIAL

## 2023-12-15 VITALS — HEIGHT: 72 IN | WEIGHT: 315 LBS | BODY MASS INDEX: 42.66 KG/M2

## 2023-12-15 DIAGNOSIS — G47.33 OSA (OBSTRUCTIVE SLEEP APNEA): Primary | ICD-10-CM

## 2023-12-15 DIAGNOSIS — R05.3 CHRONIC COUGH: ICD-10-CM

## 2023-12-15 DIAGNOSIS — R04.0 EPISTAXIS: ICD-10-CM

## 2023-12-15 PROCEDURE — 99214 OFFICE O/P EST MOD 30 MIN: CPT | Performed by: PSYCHIATRY & NEUROLOGY

## 2023-12-15 NOTE — PROGRESS NOTES
Virtual Regular Visit    Verification of patient location:    Patient is located at Home in the following state in which I hold an active license [place]      Assessment/Plan:    Problem List Items Addressed This Visit    None  Visit Diagnoses       Epistaxis    -  Primary    Relevant Orders    Ambulatory Referral to Otolaryngology    Chronic cough        Relevant Orders    XR chest pa & lateral          We discussed that I am very concerned that he is not able to use his CPAP machine due to severe epistaxis. As he has had difficulty getting in for a follow-up visit with his previous otolaryngologist, I placed a referral and reached out to a different ENT group. Rikki Rojo knows not to use CPAP due to his nosebleeds, it is concerning that he woke with a coughing and choking sensation from blood from nosebleed to the other night. This is a great challenge however as he has very severe obstructive sleep apnea with an AHI above 100. Moreover, with his elevated body mass index, he would not be a candidate for CPAP alternatives such as a hypoglossal nerve stimulator. He certainly wants to use CPAP and I think will do well with it once we can rectify the nosebleeds. He describes a chronic cough that persisted since COVID-19 has had a for a few months. I ordered a chest x-ray and asked him to follow-up with his primary care physician. It is not clear if this is a postnasal drip from his sinus issues that he is describing or a pulmonary issue. Certainly this is hard to assess on a virtual visit. He knows to avoid drowsy driving       Reason for visit is   Chief Complaint   Patient presents with    Follow-up    Virtual Regular Visit          Encounter provider Mac Davalos MD    Provider located at [address] [place] [address] [address]  [facility] [address]  [facility] [address] [phone]      Recent Visits  No visits were found meeting these conditions.   Showing recent visits within past 7 days and meeting all other requirements  Today's Visits  Date Type Provider Dept   12/15/23 Telemedicine Claudean Brewer, MD [facility]   Showing today's visits and meeting all other requirements  Future Appointments  No visits were found meeting these conditions. Showing future appointments within next 150 days and meeting all other requirements       The patient was identified by name and date of birth. Wanda Mcmanus was informed that this is a telemedicine visit and that the visit is being conducted through the [employer]. He agrees to proceed. .  My office door was closed. No one else was in the room. He acknowledged consent and understanding of privacy and security of the video platform. The patient has agreed to participate and understands they can discontinue the visit at any time. Patient is aware this is a billable service. Subjective  Wanda Mcmanus is a 33 y.o. male . HPI     Mr Nadyne Ormond returns in follow-up. Has severe MYRIAM, . Had a CPAP study which showed effective treatment. At his last visit with me, he was using CPAP consistently and had benefit. Unfortunate, he has not been able to use his CPAP machine recently, he has had recurrence of epistaxis. He tried to schedule a follow-up visit with his otolaryngologist but was told that he needs a new referral.    Leena Nix is concern as his nose bleeds are significant. Last tried CPAP 2 nights ago and woke up with blood in the back of his throat from nose bleeds. He has not used it since. He has had a hard few months with COVID-19 a few months ago, then RSV, and more recently another COVID infection. He describes postnasal drip, a persistent cough for few months as well as again recurrence of nosebleeds as noted above.     CPAP data reviewed today  AirSense 11 set at 13 to 17 cm H2O  Average session-5 hours 47 minutes, usage 3 out of 90 days  AHI 1.0  95% pressure-14 cm H2O        He has been going to bed at 11 pm and waking at 650 am   Less sleepy than he had been even without CPAP (able to sleep a consistent schedule). Feels even better with CPAP    Generally not napping. No dozing. Not drowsy with driving. Sitting and reading: Moderate chance of dozing  Watching TV: Slight chance of dozing  Sitting, inactive in a public place (e.g. a theatre or a meeting): Would never doze  As a passenger in a car for an hour without a break: Moderate chance of dozing  Lying down to rest in the afternoon when circumstances permit: High chance of dozing  Sitting and talking to someone: Would never doze  Sitting quietly after a lunch without alcohol: Slight chance of dozing  In a car, while stopped for a few minutes in traffic: Slight chance of dozing (he avoids this, does not have distance drives)   Total score: 10               Past Medical History:   Diagnosis Date    Back pain     Testicle pain        Past Surgical History:   Procedure Laterality Date    FL INJECTION RIGHT SHOULDER (ARTHROGRAM)  2/6/2023       Current Outpatient Medications   Medication Sig Dispense Refill    metFORMIN (GLUCOPHAGE-XR) 750 mg 24 hr tablet Take 1 tablet (750 mg total) by mouth daily with dinner 30 tablet 3    naproxen sodium (ALEVE) 220 MG tablet Take 220 mg by mouth if needed for mild pain      semaglutide, 0.25 or 0.5 mg/dose, (Ozempic, 0.25 or 0.5 MG/DOSE,) 2 mg/3 mL injection pen Take 0.25 mg subcutaneously once a week for 4 weeks then increase to 0.5 mg subcutaneously once a week. (Patient not taking: Reported on 12/13/2023) 3 mL 3     No current facility-administered medications for this visit.         Allergies   Allergen Reactions    Shellfish Allergy - Food Allergy Anaphylaxis    Other      Seasonal  pollen       Review of Systems  No fever but has some sinus pressure  No recent shortness of breath recently       Video Exam    Vitals:    12/15/23 1241   Weight: (!) 191 kg (420 lb)   Height: 6' (1.829 m) Physical Exam     Visit Time  Total Visit Duration: 30 minutes

## 2023-12-15 NOTE — PATIENT INSTRUCTIONS
Please see ENT ASAP (as long as you are able to, once Covid-19 clears up) for nose bleeds  Do not use CPAP as it does not seem safe due to the severity of your nose bleeds

## 2024-01-07 DIAGNOSIS — E66.9 DIABETES MELLITUS TYPE 2 IN OBESE: Primary | ICD-10-CM

## 2024-01-07 DIAGNOSIS — E11.69 DIABETES MELLITUS TYPE 2 IN OBESE: Primary | ICD-10-CM

## 2024-01-07 RX ORDER — METFORMIN HYDROCHLORIDE 750 MG/1
750 TABLET, EXTENDED RELEASE ORAL
Qty: 90 TABLET | Refills: 1 | Status: SHIPPED | OUTPATIENT
Start: 2024-01-07

## 2024-02-06 ENCOUNTER — TELEPHONE (OUTPATIENT)
Age: 29
End: 2024-02-06

## 2024-02-06 NOTE — TELEPHONE ENCOUNTER
Patient called in to reschedule nurse visit for today, warm transferred to [facility] in office

## 2024-02-16 ENCOUNTER — CLINICAL SUPPORT (OUTPATIENT)
Dept: BARIATRICS | Facility: CLINIC | Age: 29
End: 2024-02-16

## 2024-02-16 VITALS
DIASTOLIC BLOOD PRESSURE: 80 MMHG | WEIGHT: 315 LBS | TEMPERATURE: 98.3 F | SYSTOLIC BLOOD PRESSURE: 125 MMHG | RESPIRATION RATE: 16 BRPM | BODY MASS INDEX: 42.66 KG/M2 | HEIGHT: 72 IN | HEART RATE: 99 BPM

## 2024-02-16 DIAGNOSIS — R63.5 ABNORMAL WEIGHT GAIN: Primary | ICD-10-CM

## 2024-02-16 PROCEDURE — RECHECK

## 2024-02-16 NOTE — PROGRESS NOTES
Patient last visit weight:  Patient current visit weight:    If you are taking phentermine or other oral weight loss medications, are you experiencing any of the following symptoms:  Headache:  Yes  Blurred Vision:  No  Chest Pain:  No  Palpitations: No  Insomnia:  No  SPECIFY ORAL MEDICATION AND DOSAGE: METFORMIN  Pt wants to try for Iozomic , he stated the metformin is making him nauseous and bloated and not helping, but he will take a refill till the medication is approved    If you are taking an injectable medication,  are you experiencing any of the following symptoms:  Bloating:   Nausea:  Vomiting:   Constipation:   Diarrhea:  SPECIFY INJECTABLE MEDICATION AND CURRENT DOSAGE:      Vitals:    Is BP less than 100/60? No  Is BP greater than 140/90? No  Is HR greater than 100? No  **If yes to any of the above, have patient relax and repeat in 5-10 minutes**    Repeat values:    Is BP less than 100/60?  Is BP greater than 140/90?  Is HR greater than 100?  **If values remain outside of ranges above, please consult provider for next steps**

## 2024-02-18 ENCOUNTER — TELEPHONE (OUTPATIENT)
Dept: BARIATRICS | Facility: CLINIC | Age: 29
End: 2024-02-18

## 2024-02-18 DIAGNOSIS — E11.69 DIABETES MELLITUS TYPE 2 IN OBESE: ICD-10-CM

## 2024-02-18 DIAGNOSIS — E66.9 DIABETES MELLITUS TYPE 2 IN OBESE: ICD-10-CM

## 2024-02-18 NOTE — TELEPHONE ENCOUNTER
Nurse visit from 2/16/2024 reviewed. He is experiencing bloating and nausea from Metformin  mg daily. Will try to change him to Ozempic. Message sent via Jini.

## 2024-02-19 ENCOUNTER — OFFICE VISIT (OUTPATIENT)
Dept: URGENT CARE | Age: 29
End: 2024-02-19
Payer: COMMERCIAL

## 2024-02-19 VITALS
TEMPERATURE: 97.9 F | SYSTOLIC BLOOD PRESSURE: 150 MMHG | HEART RATE: 90 BPM | RESPIRATION RATE: 18 BRPM | DIASTOLIC BLOOD PRESSURE: 94 MMHG | OXYGEN SATURATION: 96 %

## 2024-02-19 DIAGNOSIS — R50.9 FEVER, UNSPECIFIED FEVER CAUSE: ICD-10-CM

## 2024-02-19 DIAGNOSIS — H10.9 CONJUNCTIVITIS OF BOTH EYES, UNSPECIFIED CONJUNCTIVITIS TYPE: Primary | ICD-10-CM

## 2024-02-19 DIAGNOSIS — R06.2 WHEEZING: ICD-10-CM

## 2024-02-19 PROCEDURE — 87636 SARSCOV2 & INF A&B AMP PRB: CPT | Performed by: NURSE PRACTITIONER

## 2024-02-19 PROCEDURE — 99213 OFFICE O/P EST LOW 20 MIN: CPT | Performed by: NURSE PRACTITIONER

## 2024-02-19 RX ORDER — OFLOXACIN 3 MG/ML
1 SOLUTION/ DROPS OPHTHALMIC 4 TIMES DAILY
Qty: 5 ML | Refills: 0 | Status: SHIPPED | OUTPATIENT
Start: 2024-02-19

## 2024-02-19 RX ORDER — DEXAMETHASONE 4 MG/1
4 TABLET ORAL 2 TIMES DAILY WITH MEALS
Qty: 10 TABLET | Refills: 0 | Status: SHIPPED | OUTPATIENT
Start: 2024-02-19

## 2024-02-19 NOTE — LETTER
February 19, 2024     Patient: Hugo Hanna   YOB: 1995   Date of Visit: 2/19/2024       To Whom it May Concern:    Hugo Hanna was seen in my clinic on 2/19/2024. He may return to work on 02/20/2024 .    If you have any questions or concerns, please don't hesitate to call.         Sincerely,          RANDY Calhoun        CC: No Recipients

## 2024-02-19 NOTE — PROGRESS NOTES
[facility]        NAME: Hugo Hanna is a 28 y.o. male  : 1995    MRN: [id number]  DATE: 2024  TIME: 9:42 AM    Assessment and Plan   Conjunctivitis of both eyes, unspecified conjunctivitis type [H10.9]  1. Conjunctivitis of both eyes, unspecified conjunctivitis type  ofloxacin (OCUFLOX) 0.3 % ophthalmic solution      2. Wheezing  dexamethasone (DECADRON) 4 mg tablet      3. Fever, unspecified fever cause  Covid/Flu- Office Collect Normal            Patient Instructions     Wash hands frequently  Excused from work for today  PCR testing for covid and flu; results in 1-2 days  Follow up with PCP in 3-5 days.  Proceed to  ER if symptoms worsen.    Chief Complaint     Chief Complaint   Patient presents with    Conjunctivitis    Cough    Sore Throat     Productive cough on and off for the past week. Redness and discharge in right eye since this morning.         History of Present Illness       HPI  Reports cough and sore throat starting 3 days ago. Getting worse. Yellowish discharge starting when he woke up this morning.     Review of Systems   Review of Systems   Constitutional:  Positive for fever (102.9 degrees this past night, about 5 hrs ago. Took some tylenol).   HENT:  Positive for congestion, rhinorrhea and sore throat. Negative for trouble swallowing.    Eyes:  Positive for discharge and redness.   Respiratory:  Positive for cough and wheezing. Negative for chest tightness and shortness of breath.    Cardiovascular:  Negative for chest pain.         Current Medications       Current Outpatient Medications:     dexamethasone (DECADRON) 4 mg tablet, Take 1 tablet (4 mg total) by mouth 2 (two) times a day with meals, Disp: 10 tablet, Rfl: 0    metFORMIN (GLUCOPHAGE-XR) 750 mg 24 hr tablet, TAKE 1 TABLET BY MOUTH DAILY WITH DINNER, Disp: 90 tablet, Rfl: 1    ofloxacin (OCUFLOX) 0.3 % ophthalmic solution, Administer 1 drop to both eyes 4 (four) times a day X 5 days, Disp: 5 mL,  Rfl: 0    naproxen sodium (ALEVE) 220 MG tablet, Take 220 mg by mouth if needed for mild pain (Patient not taking: Reported on 2/19/2024), Disp: , Rfl:     semaglutide, 0.25 or 0.5 mg/dose, (Ozempic, 0.25 or 0.5 MG/DOSE,) 2 mg/3 mL injection pen, Take 0.25 mg subcutaneously once a week for 4 weeks then increase to 0.5 mg subcutaneously once a week. (Patient not taking: Reported on 12/13/2023), Disp: 3 mL, Rfl: 3    Current Allergies     Allergies as of 02/19/2024 - Reviewed 02/19/2024   Allergen Reaction Noted    Shellfish allergy - food allergy Anaphylaxis 09/13/2017    Other  06/19/2020            The following portions of the patient's history were reviewed and updated as appropriate: allergies, current medications, past family history, past medical history, past social history, past surgical history and problem list.     Past Medical History:   Diagnosis Date    Back pain     Testicle pain        Past Surgical History:   Procedure Laterality Date    FL INJECTION RIGHT SHOULDER (ARTHROGRAM)  2/6/2023       Family History   Problem Relation Age of Onset    Diabetes Mother     Cancer Mother         brain    Heart attack Mother     Snoring Father         suspected MYRIAM    Stroke Father     Cancer Father         colon and throat    Breast cancer Family          Medications have been verified.        Objective   /94 Comment: right forearm  Pulse 90   Temp 97.9 °F (36.6 °C)   Resp 18   SpO2 96%   No LMP for male patient.       Physical Exam     Physical Exam  HENT:      Right Ear: Tympanic membrane normal.      Left Ear: Tympanic membrane normal.      Nose: Rhinorrhea present.      Mouth/Throat:      Mouth: Mucous membranes are moist.      Pharynx: No posterior oropharyngeal erythema.      Tonsils: No tonsillar exudate. 0 on the right. 0 on the left.   Eyes:      General:         Right eye: No discharge.         Left eye: No discharge.      Extraocular Movements: Extraocular movements intact.      Pupils:  Pupils are equal, round, and reactive to light.      Comments: Mild erythema of the eyes   Cardiovascular:      Rate and Rhythm: Regular rhythm.   Pulmonary:      Breath sounds: Normal breath sounds.

## 2024-02-20 LAB
FLUAV RNA RESP QL NAA+PROBE: NEGATIVE
FLUBV RNA RESP QL NAA+PROBE: NEGATIVE
SARS-COV-2 RNA RESP QL NAA+PROBE: NEGATIVE

## 2024-02-21 ENCOUNTER — TELEPHONE (OUTPATIENT)
Age: 29
End: 2024-02-21

## 2024-02-21 NOTE — TELEPHONE ENCOUNTER
PA for Ozempic appealed via     []CMM  []SS  [x]Letter sent to insurance via fax [phone]  [x]Other site or means [id number]  Case number       All necessary records sent. Will await response from insurance company    Turnaround time for a decision to be made on an appeal could take up to 30 business days

## 2024-02-23 ENCOUNTER — TELEPHONE (OUTPATIENT)
Dept: BARIATRICS | Facility: CLINIC | Age: 29
End: 2024-02-23

## 2024-02-23 NOTE — TELEPHONE ENCOUNTER
Duplicate PA Request please see telephone encounter from 2/21/24 regarding Ozempic PA. Please review patient's chart to see status of PA before creating another encounter Thank You.

## 2024-02-25 ENCOUNTER — OFFICE VISIT (OUTPATIENT)
Dept: URGENT CARE | Age: 29
End: 2024-02-25
Payer: COMMERCIAL

## 2024-02-25 VITALS
DIASTOLIC BLOOD PRESSURE: 84 MMHG | HEART RATE: 89 BPM | TEMPERATURE: 97.5 F | OXYGEN SATURATION: 97 % | SYSTOLIC BLOOD PRESSURE: 140 MMHG | RESPIRATION RATE: 20 BRPM

## 2024-02-25 DIAGNOSIS — B96.89 BACTERIAL UPPER RESPIRATORY INFECTION: Primary | ICD-10-CM

## 2024-02-25 DIAGNOSIS — J06.9 BACTERIAL UPPER RESPIRATORY INFECTION: Primary | ICD-10-CM

## 2024-02-25 PROCEDURE — 99213 OFFICE O/P EST LOW 20 MIN: CPT | Performed by: NURSE PRACTITIONER

## 2024-02-25 RX ORDER — AZITHROMYCIN 250 MG/1
TABLET, FILM COATED ORAL
Qty: 6 TABLET | Refills: 0 | Status: SHIPPED | OUTPATIENT
Start: 2024-02-25 | End: 2024-02-29

## 2024-02-25 RX ORDER — BROMPHENIRAMINE MALEATE, PSEUDOEPHEDRINE HYDROCHLORIDE, AND DEXTROMETHORPHAN HYDROBROMIDE 2; 30; 10 MG/5ML; MG/5ML; MG/5ML
10 SYRUP ORAL 3 TIMES DAILY PRN
Qty: 150 ML | Refills: 0 | Status: SHIPPED | OUTPATIENT
Start: 2024-02-25

## 2024-02-26 ENCOUNTER — OFFICE VISIT (OUTPATIENT)
Dept: FAMILY MEDICINE CLINIC | Facility: CLINIC | Age: 29
End: 2024-02-26

## 2024-02-26 VITALS
BODY MASS INDEX: 58.32 KG/M2 | OXYGEN SATURATION: 97 % | WEIGHT: 315 LBS | SYSTOLIC BLOOD PRESSURE: 120 MMHG | TEMPERATURE: 97.6 F | DIASTOLIC BLOOD PRESSURE: 81 MMHG | HEART RATE: 93 BPM

## 2024-02-26 DIAGNOSIS — J02.0 STREP THROAT: Primary | ICD-10-CM

## 2024-02-26 DIAGNOSIS — E11.9 TYPE 2 DIABETES MELLITUS WITHOUT COMPLICATION, WITHOUT LONG-TERM CURRENT USE OF INSULIN (HCC): ICD-10-CM

## 2024-02-26 LAB — S PYO AG THROAT QL: NEGATIVE

## 2024-02-26 PROCEDURE — 99213 OFFICE O/P EST LOW 20 MIN: CPT | Performed by: FAMILY MEDICINE

## 2024-02-26 PROCEDURE — 87880 STREP A ASSAY W/OPTIC: CPT | Performed by: FAMILY MEDICINE

## 2024-02-26 RX ORDER — BLOOD SUGAR DIAGNOSTIC
STRIP MISCELLANEOUS
Qty: 200 EACH | Refills: 3 | Status: SHIPPED | OUTPATIENT
Start: 2024-02-26

## 2024-02-26 RX ORDER — LANCETS 33 GAUGE
EACH MISCELLANEOUS
Qty: 200 EACH | Refills: 3 | Status: SHIPPED | OUTPATIENT
Start: 2024-02-26

## 2024-02-26 RX ORDER — BLOOD-GLUCOSE METER
KIT MISCELLANEOUS
Qty: 1 KIT | Refills: 0 | Status: SHIPPED | OUTPATIENT
Start: 2024-02-26

## 2024-02-26 RX ORDER — AMOXICILLIN 500 MG/1
500 CAPSULE ORAL EVERY 12 HOURS SCHEDULED
Qty: 20 CAPSULE | Refills: 0 | Status: SHIPPED | OUTPATIENT
Start: 2024-02-26 | End: 2024-03-07

## 2024-02-26 NOTE — PROGRESS NOTES
Name: Hugo Hanna      : 1995      MRN: [id number]  Encounter Provider: Chandan Tarango MD  Encounter Date: 2024   Encounter department: [facility]    Assessment & Plan     1. Strep throat  Assessment & Plan:  Patient presenting for eval of sore throat and cough. Symptoms present for 7-10 days, fevers several days ago now resolved.. Multiple co-workers positive for strep throat recently. Evaluated in urgent care yesterday and treated presumptively with azithromycin. No testing completed. Patient did not start course of antibiotics.    Exam remarkable for tonsillar erythema without exudate and tender cervical lymphadenopathy.  - POCT strep positive  - Amoxicillin 500mg BID x10 days  - Instructed patient that he should not start course of azithromycin    Orders:  -     amoxicillin (AMOXIL) 500 mg capsule; Take 1 capsule (500 mg total) by mouth every 12 (twelve) hours for 10 days  -     POCT rapid ANTIGEN strepA    2. Type 2 diabetes mellitus without complication, without long-term current use of insulin ([facility])  Assessment & Plan:  Patient requesting refills of blood glucose monitoring supplies.   - Refills sent to pharmacy    Orders:  -     Blood Glucose Monitoring Suppl (OneTouch Verio Reflect) w/Device KIT; Check blood sugars twice daily. Please substitute with appropriate alternative as covered by patient's insurance. Dx: E11.65  -     glucose blood (OneTouch Verio) test strip; Check blood sugars twice daily. Please substitute with appropriate alternative as covered by patient's insurance. Dx: E11.65  -     OneTouch Delica Lancets 33G MISC; Check blood sugars twice daily. Please substitute with appropriate alternative as covered by patient's insurance. Dx: E11.65           Subjective      27 yo M presenting for eval of sore throat and cough. Had associated fevers over the past several days which have resolved. Multiple co-workers and family members  have been diagnosed with strep throat recently.       Review of Systems   Constitutional:  Positive for fatigue and fever.   HENT:  Positive for congestion and sore throat.    Eyes:  Positive for redness.   Respiratory:  Positive for cough.    Skin:  Negative for rash.   Neurological:  Negative for headaches.       Current Outpatient Medications on File Prior to Visit   Medication Sig    azithromycin (ZITHROMAX) 250 mg tablet Take 2 tablets today then 1 tablet daily x 4 days    brompheniramine-pseudoephedrine-DM 30-2-10 MG/5ML syrup Take 10 mL by mouth 3 (three) times a day as needed for congestion or cough    metFORMIN (GLUCOPHAGE-XR) 750 mg 24 hr tablet TAKE 1 TABLET BY MOUTH DAILY WITH DINNER    dexamethasone (DECADRON) 4 mg tablet Take 1 tablet (4 mg total) by mouth 2 (two) times a day with meals (Patient not taking: Reported on 2/26/2024)    naproxen sodium (ALEVE) 220 MG tablet Take 220 mg by mouth if needed for mild pain (Patient not taking: Reported on 2/19/2024)    ofloxacin (OCUFLOX) 0.3 % ophthalmic solution Administer 1 drop to both eyes 4 (four) times a day X 5 days (Patient not taking: Reported on 2/26/2024)    semaglutide, 0.25 or 0.5 mg/dose, (Ozempic, 0.25 or 0.5 MG/DOSE,) 2 mg/3 mL injection pen Take 0.25 mg subcutaneously once a week for 4 weeks then increase to 0.5 mg subcutaneously once a week. (Patient not taking: Reported on 2/25/2024)       Objective     /81 (BP Location: Left arm, Patient Position: Sitting, Cuff Size: Large)   Pulse 93   Temp 97.6 °F (36.4 °C) (Temporal)   Wt (!) 195 kg (430 lb)   SpO2 97%   BMI 58.32 kg/m²     Physical Exam  Vitals reviewed.   Constitutional:       General: He is not in acute distress.     Appearance: Normal appearance.   HENT:      Head: Normocephalic.      Right Ear: External ear normal.      Left Ear: External ear normal.      Nose: Nose normal.      Mouth/Throat:      Mouth: Mucous membranes are moist.      Pharynx: Oropharynx is clear. Uvula  midline. Posterior oropharyngeal erythema present. No oropharyngeal exudate or uvula swelling.      Tonsils: No tonsillar exudate or tonsillar abscesses. 1+ on the right. 1+ on the left.   Eyes:      Extraocular Movements: Extraocular movements intact.      Pupils: Pupils are equal, round, and reactive to light.   Cardiovascular:      Rate and Rhythm: Normal rate and regular rhythm.      Heart sounds: Normal heart sounds.   Pulmonary:      Effort: Pulmonary effort is normal.      Breath sounds: Normal breath sounds.   Abdominal:      General: There is no distension.   Lymphadenopathy:      Cervical: Cervical adenopathy present.   Skin:     Findings: No rash.   Neurological:      Mental Status: He is alert and oriented to person, place, and time. Mental status is at baseline.   Psychiatric:         Mood and Affect: Mood normal.         Behavior: Behavior normal.       Chandan Tarango MD

## 2024-02-26 NOTE — ASSESSMENT & PLAN NOTE
Patient presenting for eval of sore throat and cough. Symptoms present for 7-10 days, fevers several days ago now resolved.. Multiple co-workers positive for strep throat recently. Evaluated in urgent care yesterday and treated presumptively with azithromycin. No testing completed. Patient did not start course of antibiotics.    Exam remarkable for tonsillar erythema without exudate and tender cervical lymphadenopathy.  - POCT strep positive  - Amoxicillin 500mg BID x10 days  - Instructed patient that he should not start course of azithromycin

## 2024-02-26 NOTE — LETTER
February 26, 2024     Patient: Hugo Hanna  YOB: 1995  Date of Visit: 2/26/2024      To Whom it May Concern:    Hugo Hanna is under my professional care. Hugo was seen in my office on 2/26/2024. Hugo may return to work on 2/29/24 .    If you have any questions or concerns, please don't hesitate to call.         Sincerely,          Chandan Tarango MD        CC: No Recipients

## 2024-02-26 NOTE — TELEPHONE ENCOUNTER
PA for Ozempic Approved   Date(s) approved 2/21/25  Case #     Patient advised by [x] [employer] Message                      [x] Phone call       Pharmacy advised by [x]Fax                                     []Phone call    Approval letter scanned into Media Yes

## 2024-02-26 NOTE — PROGRESS NOTES
[facility]        NAME: Hugo Hanna is a 28 y.o. male  : 1995    MRN: [id number]  DATE: 2024  TIME: 7:21 PM    Assessment and Plan   Bacterial upper respiratory infection [J06.9, B96.89]  1. Bacterial upper respiratory infection  brompheniramine-pseudoephedrine-DM 30-2-10 MG/5ML syrup    azithromycin (ZITHROMAX) 250 mg tablet            Patient Instructions     Take med as prescribed  Follow up with PCP in 3-5 days.  Proceed to  ER if symptoms worsen.    Chief Complaint     Chief Complaint   Patient presents with    Sore Throat     Started with symptoms over the last two week. Sore throat started over the last 2-3 day. Reports chill, sweats . Fever reported as 102.0 Friday evening @6pm. Last seen here on 24 for conjunctivitis and cough . Last dose of decadron was taken today. Cough is keeping him up at night. History of tonsil stones , has been spitting them out .     Cough         History of Present Illness       HPI  Sore throat and cough. Coughing is ongoing for over a week. Sore throat for a few days. Previously treated with steroid and that did not improve the symptoms.     Review of Systems   Review of Systems   Constitutional:  Negative for fever.   HENT:  Positive for congestion, rhinorrhea and sore throat.    Respiratory:  Negative for cough, shortness of breath and wheezing.    Cardiovascular:  Negative for chest pain.   Gastrointestinal:  Negative for diarrhea and vomiting.   Neurological:  Negative for headaches.         Current Medications       Current Outpatient Medications:     azithromycin (ZITHROMAX) 250 mg tablet, Take 2 tablets today then 1 tablet daily x 4 days, Disp: 6 tablet, Rfl: 0    brompheniramine-pseudoephedrine-DM 30-2-10 MG/5ML syrup, Take 10 mL by mouth 3 (three) times a day as needed for congestion or cough, Disp: 150 mL, Rfl: 0    dexamethasone (DECADRON) 4 mg tablet, Take 1 tablet (4 mg total) by mouth 2 (two) times a day with meals, Disp:  10 tablet, Rfl: 0    metFORMIN (GLUCOPHAGE-XR) 750 mg 24 hr tablet, TAKE 1 TABLET BY MOUTH DAILY WITH DINNER, Disp: 90 tablet, Rfl: 1    ofloxacin (OCUFLOX) 0.3 % ophthalmic solution, Administer 1 drop to both eyes 4 (four) times a day X 5 days, Disp: 5 mL, Rfl: 0    naproxen sodium (ALEVE) 220 MG tablet, Take 220 mg by mouth if needed for mild pain (Patient not taking: Reported on 2/19/2024), Disp: , Rfl:     semaglutide, 0.25 or 0.5 mg/dose, (Ozempic, 0.25 or 0.5 MG/DOSE,) 2 mg/3 mL injection pen, Take 0.25 mg subcutaneously once a week for 4 weeks then increase to 0.5 mg subcutaneously once a week. (Patient not taking: Reported on 2/25/2024), Disp: 3 mL, Rfl: 3    Current Allergies     Allergies as of 02/25/2024 - Reviewed 02/25/2024   Allergen Reaction Noted    Shellfish allergy - food allergy Anaphylaxis 09/13/2017    Other  06/19/2020            The following portions of the patient's history were reviewed and updated as appropriate: allergies, current medications, past family history, past medical history, past social history, past surgical history and problem list.     Past Medical History:   Diagnosis Date    Back pain     Testicle pain        Past Surgical History:   Procedure Laterality Date    FL INJECTION RIGHT SHOULDER (ARTHROGRAM)  2/6/2023       Family History   Problem Relation Age of Onset    Diabetes Mother     Cancer Mother         brain    Heart attack Mother     Snoring Father         suspected MYRIAM    Stroke Father     Cancer Father         colon and throat    Breast cancer Family          Medications have been verified.        Objective   /84   Pulse 89   Temp 97.5 °F (36.4 °C) (Temporal)   Resp 20   SpO2 97%   No LMP for male patient.       Physical Exam     Physical Exam  Constitutional:       Appearance: He is not ill-appearing or diaphoretic.   HENT:      Right Ear: Tympanic membrane normal.      Left Ear: Tympanic membrane normal.      Nose: Rhinorrhea present.       Mouth/Throat:      Pharynx: Posterior oropharyngeal erythema present.      Tonsils: No tonsillar exudate. 0 on the right. 0 on the left.   Cardiovascular:      Rate and Rhythm: Regular rhythm.   Pulmonary:      Breath sounds: Normal breath sounds.

## 2024-03-08 DIAGNOSIS — E11.9 TYPE 2 DIABETES MELLITUS WITHOUT COMPLICATION, WITHOUT LONG-TERM CURRENT USE OF INSULIN (HCC): Primary | ICD-10-CM

## 2024-03-08 RX ORDER — BLOOD-GLUCOSE METER
EACH MISCELLANEOUS
Qty: 1 KIT | Refills: 1 | Status: SHIPPED | OUTPATIENT
Start: 2024-03-08

## 2024-03-08 NOTE — TELEPHONE ENCOUNTER
Patient called and stated that his insurance is requesting that he use the Coutour Next diabetic testing device as the current device, the insurance does not cover the test strips and they are rather expensive. Please review and send to pharmacy

## 2024-03-12 ENCOUNTER — OFFICE VISIT (OUTPATIENT)
Dept: UROLOGY | Facility: AMBULATORY SURGERY CENTER | Age: 29
End: 2024-03-12
Payer: COMMERCIAL

## 2024-03-12 VITALS
HEART RATE: 112 BPM | BODY MASS INDEX: 41.75 KG/M2 | WEIGHT: 315 LBS | OXYGEN SATURATION: 97 % | DIASTOLIC BLOOD PRESSURE: 80 MMHG | HEIGHT: 73 IN | SYSTOLIC BLOOD PRESSURE: 120 MMHG

## 2024-03-12 DIAGNOSIS — N50.89 TESTICULAR CALCIFICATION: Primary | ICD-10-CM

## 2024-03-12 DIAGNOSIS — Z30.2 ENCOUNTER FOR STERILIZATION: ICD-10-CM

## 2024-03-12 PROCEDURE — 99204 OFFICE O/P NEW MOD 45 MIN: CPT | Performed by: UROLOGY

## 2024-03-12 RX ORDER — ALPRAZOLAM 1 MG/1
1 TABLET ORAL
Qty: 1 TABLET | Refills: 0 | Status: SHIPPED | OUTPATIENT
Start: 2024-03-12

## 2024-03-12 NOTE — PROGRESS NOTES
3/12/2024    Hugo Hanna  1995  [id number]        Assessment  Elective sterilization  Right testicular calcification    Plan  We discussed the right testicular calcification.  This does not have the feeling of malignancy.  It feels benign.  However I would like to confirm with a scrotal ultrasound.  Discussed this with the patient and he agrees.    We discussed our vasectomy packet in detail. He understands the indications, risks, and benefits of the procedure. He understands that this is generally considered a permanent procedure although there is a method for reversal, it is not guaranteed to work and would not be covered by insurance. He understands that he would not be considered sterile until negative semen analysis is obtained post procedure. He further understands that he must rest, ice, and elevate the scrotum for at least 48 hours to avoid complications such as hematoma.  Consent was obtained in the office today. All of his questions were answered. A prescription for xanax to be taken prior to the visit was sent to his pharmacy.          History of Present Illness  Hugo is a 28 y.o. male requesting sterilization.  He has 2 children aged 2 and 2 months.  He works doing security.  Denies prior urologic issues however on further questioning he reported he had some pain in the past and was evaluated by another urologist.  He does not recall when.  There is an order for ultrasound from 2022.            Review of Systems  Review of Systems   Constitutional: Negative.    HENT: Negative.     Respiratory: Negative.     Cardiovascular: Negative.    Gastrointestinal: Negative.    Genitourinary:         As per HPI   Musculoskeletal: Negative.    Skin: Negative.    Neurological: Negative.    Hematological: Negative.          Past Medical History  Past Medical History:   Diagnosis Date    Back pain     Testicle pain        Past Social History  Past Surgical History:   Procedure Laterality Date    FL  INJECTION RIGHT SHOULDER (ARTHROGRAM)  2/6/2023       Past Family History  Family History   Problem Relation Age of Onset    Diabetes Mother     Cancer Mother         brain    Heart attack Mother     Snoring Father         suspected MYRIAM    Stroke Father     Cancer Father         colon and throat    Breast cancer Family        Past Social history  Social History     Socioeconomic History    Marital status: /Civil Union     Spouse name: Not on file    Number of children: Not on file    Years of education: Not on file    Highest education level: Not on file   Occupational History    Not on file   Tobacco Use    Smoking status: Never    Smokeless tobacco: Never   Vaping Use    Vaping status: Never Used   Substance and Sexual Activity    Alcohol use: Yes     Comment: socially    Drug use: No    Sexual activity: Not on file   Other Topics Concern    Not on file   Social History Narrative    Not on file     Social Determinants of Health     Financial Resource Strain: Low Risk  (1/4/2023)    Overall Financial Resource Strain (CARDIA)     Difficulty of Paying Living Expenses: Not very hard   Food Insecurity: No Food Insecurity (1/4/2023)    Hunger Vital Sign     Worried About Running Out of Food in the Last Year: Never true     Ran Out of Food in the Last Year: Never true   Transportation Needs: No Transportation Needs (1/4/2023)    PRAPARE - Transportation     Lack of Transportation (Medical): No     Lack of Transportation (Non-Medical): No   Physical Activity: Inactive (8/18/2023)    Exercise Vital Sign     Days of Exercise per Week: 0 days     Minutes of Exercise per Session: 0 min   Stress: No Stress Concern Present (1/4/2023)    [employer] [place] [employer] - Occupational Stress Questionnaire     Feeling of Stress : Not at all   Social Connections: Not on file   Intimate Partner Violence: Not At Risk (8/18/2023)    Humiliation, Afraid, Rape, and Kick questionnaire     Fear of Current or Ex-Partner: No      Emotionally Abused: No     Physically Abused: No     Sexually Abused: No   Housing Stability: Unknown (1/4/2023)    Housing Stability Vital Sign     Unable to Pay for Housing in the Last Year: No     Number of Places Lived in the Last Year: Not on file     Unstable Housing in the Last Year: No     Social History     Tobacco Use   Smoking Status Never   Smokeless Tobacco Never       Current Medications  Current Outpatient Medications   Medication Sig Dispense Refill    metFORMIN (GLUCOPHAGE-XR) 750 mg 24 hr tablet TAKE 1 TABLET BY MOUTH DAILY WITH DINNER 90 tablet 1    OneTouch Delica Lancets 33G MISC Check blood sugars twice daily. Please substitute with appropriate alternative as covered by patient's insurance. Dx: E11.65 200 each 3    Blood Glucose Monitoring Suppl (Contour Next Monitor) w/Device KIT Check blood sugars twice daily in morning and evening (Patient not taking: Reported on 3/12/2024) 1 kit 1    brompheniramine-pseudoephedrine-DM 30-2-10 MG/5ML syrup Take 10 mL by mouth 3 (three) times a day as needed for congestion or cough 150 mL 0    dexamethasone (DECADRON) 4 mg tablet Take 1 tablet (4 mg total) by mouth 2 (two) times a day with meals 10 tablet 0    glucose blood (OneTouch Verio) test strip Check blood sugars twice daily. Please substitute with appropriate alternative as covered by patient's insurance. Dx: E11.65 (Patient not taking: Reported on 3/12/2024) 200 each 3    naproxen sodium (ALEVE) 220 MG tablet Take 220 mg by mouth if needed for mild pain      ofloxacin (OCUFLOX) 0.3 % ophthalmic solution Administer 1 drop to both eyes 4 (four) times a day X 5 days 5 mL 0    semaglutide, 0.25 or 0.5 mg/dose, (Ozempic, 0.25 or 0.5 MG/DOSE,) 2 mg/3 mL injection pen Take 0.25 mg subcutaneously once a week for 4 weeks then increase to 0.5 mg subcutaneously once a week. (Patient not taking: Reported on 3/12/2024) 3 mL 3     No current facility-administered medications for this visit.  "      Allergies  Allergies   Allergen Reactions    Shellfish Allergy - Food Allergy Anaphylaxis    Other      Seasonal  pollen       Past Medical History, Social History, Family History, medications and allergies were reviewed.    Vitals  Vitals:    03/12/24 1505   BP: 120/80   BP Location: Left arm   Patient Position: Sitting   Cuff Size: Standard   Pulse: (!) 112   SpO2: 97%   Weight: (!) 146 kg (321 lb)   Height: 6' 1\" (1.854 m)       Physical Exam  Physical Exam  Vitals reviewed.   Constitutional:       Appearance: He is well-developed.   HENT:      Head: Normocephalic and atraumatic.   Eyes:      Conjunctiva/sclera: Conjunctivae normal.   Cardiovascular:      Rate and Rhythm: Normal rate.   Pulmonary:      Effort: Pulmonary effort is normal.   Abdominal:      Palpations: Abdomen is soft.   Genitourinary:     Comments: Vasa normal.  Small lateral right testis calcification.  Musculoskeletal:         General: Normal range of motion.   Skin:     General: Skin is warm and dry.   Neurological:      Mental Status: He is alert and oriented to person, place, and time.   Psychiatric:         Mood and Affect: Mood normal.           Results  Lab Results   Component Value Date    PSA 0.3 01/18/2022     Lab Results   Component Value Date    CALCIUM 9.5 07/29/2023    K 4.1 07/29/2023    CO2 24 07/29/2023     (H) 07/29/2023    BUN 13 07/29/2023    CREATININE 0.79 07/29/2023     Lab Results   Component Value Date    WBC 8.41 08/16/2023    HGB 14.7 08/16/2023    HCT 45.5 08/16/2023    MCV 90 08/16/2023     08/16/2023           "

## 2024-04-30 ENCOUNTER — HOSPITAL ENCOUNTER (OUTPATIENT)
Dept: RADIOLOGY | Age: 29
Discharge: HOME | End: 2024-04-30
Payer: COMMERCIAL

## 2024-04-30 DIAGNOSIS — N50.89 TESTICULAR CALCIFICATION: ICD-10-CM

## 2024-04-30 PROCEDURE — 76870 US EXAM SCROTUM: CPT

## 2024-05-23 ENCOUNTER — AMB VIDEO VISIT (OUTPATIENT)
Dept: OTHER | Facility: HOSPITAL | Age: 29
End: 2024-05-23
Payer: COMMERCIAL

## 2024-05-23 VITALS — SYSTOLIC BLOOD PRESSURE: 136 MMHG | OXYGEN SATURATION: 99 % | DIASTOLIC BLOOD PRESSURE: 88 MMHG

## 2024-05-23 DIAGNOSIS — K52.9 GASTROENTERITIS: Primary | ICD-10-CM

## 2024-05-23 PROCEDURE — 99212 OFFICE O/P EST SF 10 MIN: CPT | Performed by: NURSE PRACTITIONER

## 2024-05-23 NOTE — LETTER
May 23, 2024     Hugo Hanna    Patient: Hugo Hanna   YOB: 1995   Date of Visit: 5/23/2024     May 23, 2024     Patient: Hugo Hanna   YOB: 1995   Date of Visit: 5/23/2024       To Whom it May Concern:    Hugo Hanna is under my professional care. He was seen virtually on 5/23/2024. Please excuse on 05/22/2024 and 05/23/2024. He can return on 05/28/2024.    If you have any questions or concerns, please don't hesitate to call.         Sincerely,          RANDY Arteaga        CC: No Recipients

## 2024-05-24 ENCOUNTER — AMB VIDEO VISIT (OUTPATIENT)
Dept: OTHER | Facility: HOSPITAL | Age: 29
End: 2024-05-24

## 2024-05-24 NOTE — PATIENT INSTRUCTIONS
Rest and drink extra fluids. Start immodium as needed.  [place] foods. Extra fluids. Follow up with PCP if no improvement.  Go to ER with any worsening symptoms.     Gastroenteritis   WHAT YOU NEED TO KNOW:   Gastroenteritis, or stomach flu, is an infection of the stomach and intestines.        DISCHARGE INSTRUCTIONS:   Call 911 for any of the following:   You have trouble breathing or a very fast pulse.      Return to the emergency department if:   You see blood in your diarrhea.    You cannot stop vomiting.    You have not urinated for 12 hours.     You feel like you are going to faint.    Contact your healthcare provider if:   You have a fever.    You continue to vomit or have diarrhea, even after treatment.    You see worms in your diarrhea.    Your mouth or eyes are dry. You are not urinating as much or as often.    You have questions or concerns about your condition or care.    Medicines:   Medicines  may be given to stop vomiting or diarrhea, decrease abdominal cramps, or treat an infection.    Take your medicine as directed.  Contact your healthcare provider if you think your medicine is not helping or if you have side effects. Tell your provider if you are allergic to any medicine. Keep a list of the medicines, vitamins, and herbs you take. Include the amounts, and when and why you take them. Bring the list or the pill bottles to follow-up visits. Carry your medicine list with you in case of an emergency.    Manage your symptoms:   Drink liquids as directed.  Ask your healthcare provider how much liquid to drink each day, and which liquids are best for you. You may also need to drink an oral rehydration solution (ORS). An ORS has the right amounts of sugar, salt, and minerals in water to replace body fluids.    Eat bland foods.  When you feel hungry, begin eating soft, bland foods. Examples are bananas, clear soup, potatoes, and applesauce. Do not have dairy products, alcohol, sugary drinks, or drinks with  caffeine until you feel better.    Rest as much as possible.  Slowly start to do more each day when you begin to feel better.    Prevent the spread of gastroenteritis:  Gastroenteritis can spread easily. Keep yourself, your family, and your surroundings clean to help prevent the spread of gastroenteritis:  Wash your hands often.  Use soap and water. Wash your hands after you use the bathroom, change a child's diapers, or sneeze. Wash your hands before you prepare or eat food.         Clean surfaces and do laundry often.  Wash your clothes and towels separately from the rest of the laundry. Clean surfaces in your home with antibacterial  or bleach.    Clean food thoroughly and cook safely.  Wash raw vegetables before you cook. Cook meat, fish, and eggs fully. Do not use the same dishes for raw meat as you do for other foods. Refrigerate any leftover food immediately.    Be aware when you camp or travel.  Drink only clean water. Do not drink from rivers or lakes unless you purify or boil the water first. When you travel, drink bottled water and do not add ice. Do not eat fruit that has not been peeled. Do not eat raw fish or meat that is not fully cooked.    Follow up with your doctor as directed:  Write down your questions so you remember to ask them during your visits.  © Copyright [employer] 2023 Information is for End User's use only and may not be sold, redistributed or otherwise used for commercial purposes.  The above information is an  only. It is not intended as medical advice for individual conditions or treatments. Talk to your doctor, nurse or pharmacist before following any medical regimen to see if it is safe and effective for you.

## 2024-05-24 NOTE — CARE ANYWHERE EVISITS
Visit Summary for CALEB MEDEROS - Gender: Male - Date of Birth: 1995  Date: [id number] - Duration: 7 minutes  Patient: CALEB MEDEROS  Provider: Blas PADILLA    Patient Contact Information  Address  [address] [place] [address]  [phone]    Visit Topics  Fever [Added By: Self - 2024-05-24]  Diarrhea, chills, severe stomach cramps [Added By: Self - 2024-05-24]  Stomachache [Added By: Self - 2024-05-24]    Triage Questions   What is your current physical address in the event of a medical emergency? Answer []  Are you allergic to any medications? Answer []  Are you now or could you be pregnant? Answer []  Do you have any immune system compromise or chronic lung   disease? Answer []  Do you have any vulnerable family members in the home (infant, pregnant, cancer, elderly)? Answer []     Conversation Transcripts  [0A][0A] [Notification] You are connected with lBas PADILLA, Urgent Care Specialist.[0A][Notification] CALEB MEDEROS is located in [place].[0A][Notification] CALEB MEDEROS has shared health history...[0A]    Diagnosis  Noninfective gastroenteritis and colitis, unspecified    Procedures  Value: 53599 Code: CPT-4 UNLISTED E&M SERVICE    Medications Prescribed    No prescriptions ordered    Electronically signed by: Blas Vincent(NPI [id number])

## 2024-05-24 NOTE — PROGRESS NOTES
Required Documentation:  Encounter provider: RANDY Arteaga    Identify all parties in room with patient during virtual visit:  No one else    The patient was identified by name and date of birth. Hugo Hanna was informed that this is a telemedicine visit and that the visit is being conducted through the [employer] platform. He agrees to proceed..  My office door was closed. No one else was in the room.  He acknowledged consent and understanding of privacy and security of the video platform. The patient has agreed to participate and understands they can discontinue the visit at any time.    Verification of patient location:  Patient is located at Home in the following state in which I hold an active license [place]    Patient is aware this is a billable service.     Reason for visit is No chief complaint on file.       Subjective  This is a 28 year old male here today for video visit.  He states he is having diarrhea, stomach cramping, liquid stool.  He had tmax yesterday of 101.  He had some normal food today.  Children have similar symptoms.  He denies any vomits but had taste of throw up in his mouth.  Pain only with cramping.  He has not tried anything except for gas x and pepto.Symptoms started on 5/22           Past Medical History:   Diagnosis Date    Back pain     Testicle pain        Past Surgical History:   Procedure Laterality Date    FL INJECTION RIGHT SHOULDER (ARTHROGRAM)  2/6/2023        Allergies   Allergen Reactions    Shellfish Allergy - Food Allergy Anaphylaxis    Other      Seasonal  pollen       Review of Systems   Constitutional:  Negative for activity change, chills, fatigue and fever.   Cardiovascular: Negative.    Gastrointestinal:  Positive for diarrhea. Negative for nausea and vomiting.   Genitourinary: Negative.    Skin: Negative.    Neurological: Negative.    Psychiatric/Behavioral: Negative.         Video Exam    Vitals:    05/23/24 2256   BP: 136/88   SpO2: 99%        Physical Exam  Constitutional:       General: He is not in acute distress.     Appearance: Normal appearance. He is not ill-appearing or toxic-appearing.   HENT:      Head: Normocephalic and atraumatic.   Eyes:      Conjunctiva/sclera: Conjunctivae normal.   Pulmonary:      Effort: Pulmonary effort is normal. No respiratory distress.   Abdominal:      Tenderness: There is no abdominal tenderness.   Neurological:      Mental Status: He is alert and oriented to person, place, and time.   Psychiatric:         Mood and Affect: Mood normal.         Behavior: Behavior normal.         Thought Content: Thought content normal.         Judgment: Judgment normal.         Visit Time  Total Visit Duration: 8 minutes    Assessment/Plan:    Diagnoses and all orders for this visit:    Gastroenteritis        Patient Instructions   Rest and drink extra fluids. Start immodium as needed.  [place] foods. Extra fluids. Follow up with PCP if no improvement.  Go to ER with any worsening symptoms.     Gastroenteritis   WHAT YOU NEED TO KNOW:   Gastroenteritis, or stomach flu, is an infection of the stomach and intestines.        DISCHARGE INSTRUCTIONS:   Call 911 for any of the following:   You have trouble breathing or a very fast pulse.      Return to the emergency department if:   You see blood in your diarrhea.    You cannot stop vomiting.    You have not urinated for 12 hours.     You feel like you are going to faint.    Contact your healthcare provider if:   You have a fever.    You continue to vomit or have diarrhea, even after treatment.    You see worms in your diarrhea.    Your mouth or eyes are dry. You are not urinating as much or as often.    You have questions or concerns about your condition or care.    Medicines:   Medicines  may be given to stop vomiting or diarrhea, decrease abdominal cramps, or treat an infection.    Take your medicine as directed.  Contact your healthcare provider if you think your medicine is not  helping or if you have side effects. Tell your provider if you are allergic to any medicine. Keep a list of the medicines, vitamins, and herbs you take. Include the amounts, and when and why you take them. Bring the list or the pill bottles to follow-up visits. Carry your medicine list with you in case of an emergency.    Manage your symptoms:   Drink liquids as directed.  Ask your healthcare provider how much liquid to drink each day, and which liquids are best for you. You may also need to drink an oral rehydration solution (ORS). An ORS has the right amounts of sugar, salt, and minerals in water to replace body fluids.    Eat bland foods.  When you feel hungry, begin eating soft, bland foods. Examples are bananas, clear soup, potatoes, and applesauce. Do not have dairy products, alcohol, sugary drinks, or drinks with caffeine until you feel better.    Rest as much as possible.  Slowly start to do more each day when you begin to feel better.    Prevent the spread of gastroenteritis:  Gastroenteritis can spread easily. Keep yourself, your family, and your surroundings clean to help prevent the spread of gastroenteritis:  Wash your hands often.  Use soap and water. Wash your hands after you use the bathroom, change a child's diapers, or sneeze. Wash your hands before you prepare or eat food.         Clean surfaces and do laundry often.  Wash your clothes and towels separately from the rest of the laundry. Clean surfaces in your home with antibacterial  or bleach.    Clean food thoroughly and cook safely.  Wash raw vegetables before you cook. Cook meat, fish, and eggs fully. Do not use the same dishes for raw meat as you do for other foods. Refrigerate any leftover food immediately.    Be aware when you camp or travel.  Drink only clean water. Do not drink from rivers or lakes unless you purify or boil the water first. When you travel, drink bottled water and do not add ice. Do not eat fruit that has not been  peeled. Do not eat raw fish or meat that is not fully cooked.    Follow up with your doctor as directed:  Write down your questions so you remember to ask them during your visits.  © Copyright [employer] 2023 Information is for End User's use only and may not be sold, redistributed or otherwise used for commercial purposes.  The above information is an  only. It is not intended as medical advice for individual conditions or treatments. Talk to your doctor, nurse or pharmacist before following any medical regimen to see if it is safe and effective for you.

## 2024-05-30 ENCOUNTER — OFFICE VISIT (OUTPATIENT)
Dept: FAMILY MEDICINE CLINIC | Facility: CLINIC | Age: 29
End: 2024-05-30

## 2024-05-30 VITALS
RESPIRATION RATE: 18 BRPM | OXYGEN SATURATION: 97 % | HEART RATE: 84 BPM | HEIGHT: 72 IN | TEMPERATURE: 97.9 F | WEIGHT: 315 LBS | DIASTOLIC BLOOD PRESSURE: 87 MMHG | SYSTOLIC BLOOD PRESSURE: 155 MMHG | BODY MASS INDEX: 42.66 KG/M2

## 2024-05-30 DIAGNOSIS — M25.562 ACUTE PAIN OF LEFT KNEE: Primary | ICD-10-CM

## 2024-05-30 PROCEDURE — 99213 OFFICE O/P EST LOW 20 MIN: CPT | Performed by: FAMILY MEDICINE

## 2024-05-31 NOTE — PROGRESS NOTES
Ambulatory Visit  Name: Hugo Hanna      : 1995      MRN: [id number]  Encounter Provider: Chandan Tarango MD  Encounter Date: 2024   Encounter department: [facility]    Assessment & Plan   1. Acute pain of left knee  Assessment & Plan:  Patient presenting today for evaluation of left knee pain.  Left knee pain developed after he was engaged in his morning yoga routine.  He was bearing weight on the left knee and twisted his upper body.  While twisting his upper body on a planted left foot he felt a popping sensation and developed pain in the left knee immediately.  He did not immediately develop swelling but noticed episodes of intense pain especially while walking up the stairs and planting on the left leg.  With each episode of pain he does note some minor swelling around the left knee.  In addition, he has noticed the sensation of instability in the left knee. Specifically, he feels as if his knee joint is moving independently of his leg or as if his femur and tibia are sliding across each other when walking up the stairs.  He has felt his knee buckle several times as well.   Right knee examined prior to left knee to establish baseline.  Secondary to body habitus Lachman test not attempted. Difficult to establish firm endpoint on anterior drawer testing of the left knee.  Posterior drawer test appears equal to right knee. No crepitus or locking noted on passive ranging of the left knee, Fanny's test negative. No joint line tenderness noted. No swelling appreciated during exam.  # Patient's description of injury/mechanism of injury along with exam findings concerning for ligamentous injury within the left knee; possibly ACL injury.  Do not suspect meniscus injury at this time, however still possible.  - MRI left knee ordered  - Will hold off on referral to orthopedics pending MRI results  - Informed patient that prior authorization likely needed  before MRI can be scheduled. Patient aware, understands likely timing delay  Orders:  -     MRI knee left  wo contrast; Future; Expected date: 05/30/2024       History of Present Illness     28-year-old male presenting for evaluation of left knee pain.  He developed the pain a little over a week ago.  He was twisting his upper body while planted on his left foot.  While twisting he felt a popping sensation in the left knee and developed immediate pain.  There was no swelling noted immediately but he has felt episodes of severe pain in the left knee especially while walking up the stairs.  He also describes episode of instability and buckling of the knee that has caused him to fall.  He also has developed some intermittent swelling after these episodes.  He denies any other injuries secondary to his knee pain or the buckling of his knee.        Review of Systems   Musculoskeletal:  Positive for arthralgias, gait problem and joint swelling.   Neurological:  Negative for weakness and numbness.   Psychiatric/Behavioral: Negative.     All other systems reviewed and are negative.      Objective     /87 (BP Location: Left arm, Patient Position: Sitting, Cuff Size: Standard)   Pulse 84   Temp 97.9 °F (36.6 °C) (Temporal)   Resp 18   Ht 6' (1.829 m)   Wt (!) 191 kg (421 lb 3.2 oz)   SpO2 97%   BMI 57.12 kg/m²     Physical Exam  Constitutional:       Appearance: Normal appearance.   HENT:      Head: Normocephalic.      Right Ear: External ear normal.      Left Ear: External ear normal.      Nose: Nose normal.      Mouth/Throat:      Mouth: Mucous membranes are moist.      Pharynx: Oropharynx is clear.   Eyes:      Extraocular Movements: Extraocular movements intact.   Pulmonary:      Effort: Pulmonary effort is normal.   Musculoskeletal:         General: No deformity.      Right knee: Normal.      Left knee: No swelling, deformity, ecchymosis, bony tenderness or crepitus. No tenderness. No LCL laxity or MCL  laxity.     Instability Tests: Anterior drawer test positive. Posterior drawer test negative. Medial Fanny test negative and lateral Fanny test negative.      Right lower leg: No edema.      Left lower leg: No edema.   Neurological:      General: No focal deficit present.      Mental Status: He is alert and oriented to person, place, and time.      Sensory: No sensory deficit.      Motor: No weakness.   Psychiatric:         Mood and Affect: Mood normal.         Behavior: Behavior normal.       Administrative Statements     Chandan Tarango MD

## 2024-05-31 NOTE — ASSESSMENT & PLAN NOTE
Patient presenting today for evaluation of left knee pain.  Left knee pain developed after he was engaged in his morning yoga routine.  He was bearing weight on the left knee and twisted his upper body.  While twisting his upper body on a planted left foot he felt a popping sensation and developed pain in the left knee immediately.  He did not immediately develop swelling but noticed episodes of intense pain especially while walking up the stairs and planting on the left leg.  With each episode of pain he does note some minor swelling around the left knee.  In addition, he has noticed the sensation of instability in the left knee. Specifically, he feels as if his knee joint is moving independently of his leg or as if his femur and tibia are sliding across each other when walking up the stairs.  He has felt his knee buckle several times as well.   Right knee examined prior to left knee to establish baseline.  Secondary to body habitus Lachman test not attempted. Difficult to establish firm endpoint on anterior drawer testing of the left knee.  Posterior drawer test appears equal to right knee. No crepitus or locking noted on passive ranging of the left knee, Fanny's test negative. No joint line tenderness noted. No swelling appreciated during exam.  # Patient's description of injury/mechanism of injury along with exam findings concerning for ligamentous injury within the left knee; possibly ACL injury.  Do not suspect meniscus injury at this time, however still possible.  - MRI left knee ordered  - Will hold off on referral to orthopedics pending MRI results  - Informed patient that prior authorization likely needed before MRI can be scheduled. Patient aware, understands likely timing delay

## 2024-06-03 ENCOUNTER — PROCEDURE VISIT (OUTPATIENT)
Dept: UROLOGY | Facility: CLINIC | Age: 29
End: 2024-06-03
Payer: COMMERCIAL

## 2024-06-03 VITALS
OXYGEN SATURATION: 93 % | SYSTOLIC BLOOD PRESSURE: 132 MMHG | BODY MASS INDEX: 41.75 KG/M2 | WEIGHT: 315 LBS | DIASTOLIC BLOOD PRESSURE: 82 MMHG | HEART RATE: 96 BPM | HEIGHT: 73 IN

## 2024-06-03 DIAGNOSIS — Z30.2 ENCOUNTER FOR STERILIZATION: Primary | ICD-10-CM

## 2024-06-03 PROCEDURE — 55250 REMOVAL OF SPERM DUCT(S): CPT | Performed by: UROLOGY

## 2024-06-03 PROCEDURE — 88302 TISSUE EXAM BY PATHOLOGIST: CPT | Performed by: PATHOLOGY

## 2024-06-03 RX ORDER — BLOOD-GLUCOSE METER
EACH MISCELLANEOUS
COMMUNITY
Start: 2024-03-27

## 2024-06-03 NOTE — PROGRESS NOTES
Assessment/Plan:    Encounter for sterilization  The patient had a vasectomy today without any significant issues. We discussed his plan of care at home. He should try to have minimal activity for 3 days to reduce the risk for bleeding/hematoma. After 3 days he can move as desired but no heavy lifting or running for 2 weeks. No ejaculations for 1-2 weeks. No submerging his incision below water for 2 weeks.  He should use tylenol and NSAIDs (although I asked him minimize NSAID use at first to minimize bleeding risk).  His skin sutures will dissolve on their own. Some skin separation is not uncommon and not concerning. He should contact us for signs of infection, bleeding or persistent pain.  He should ejaculate approximately 30 times before performing a semen analysis. If this does not show sperm or shows rare nonmotile sperm then he is considered infertile but until a semen analysis is performed with these results he should be using protection with intercourse.          Subjective:      Patient ID: Hugo Hanna is a 28 y.o. male.    BRAD Arias is a 28 y.o. male requesting sterilization.  He has 2 children aged 2 and 2 months.  He works doing security.    Denies prior urologic issues however on further questioning he reported he had some pain in the past and was evaluated by another urologist.  He does not recall when.  Exam showed a calcification in his right hemiscrotum with ultrasound subsequently showed to be a scrotal henna.    Past Surgical History:   Procedure Laterality Date    FL INJECTION RIGHT SHOULDER (ARTHROGRAM)  2/6/2023        Past Medical History:   Diagnosis Date    Back pain     Testicle pain         AUA SYMPTOM SCORE      Flowsheet Row Most Recent Value   AUA SYMPTOM SCORE    How often have you had a sensation of not emptying your bladder completely after you finished urinating? 1 (P)     How often have you had to urinate again less than two hours after you finished urinating? 1 (P)    "  How often have you found you stopped and started again several times when you urinate? 1 (P)     How often have you found it difficult to postpone urination? 0 (P)     How often have you had a weak urinary stream? 1 (P)     How often have you had to push or strain to begin urination? 1 (P)     How many times did you most typically get up to urinate from the time you went to bed at night until the time you got up in the morning? 1 (P)     Quality of Life: If you were to spend the rest of your life with your urinary condition just the way it is now, how would you feel about that? 2 (P)     AUA SYMPTOM SCORE 6 (P)               Review of Systems   Constitutional:  Negative for chills and fever.   HENT:  Negative for ear pain and sore throat.    Eyes:  Negative for pain and visual disturbance.   Respiratory:  Negative for cough and shortness of breath.    Cardiovascular:  Negative for chest pain and palpitations.   Gastrointestinal:  Negative for abdominal pain and vomiting.   Genitourinary:  Negative for dysuria and hematuria.   Musculoskeletal:  Negative for arthralgias and back pain.   Skin:  Negative for color change and rash.   Neurological:  Negative for seizures and syncope.   All other systems reviewed and are negative.        Objective:      /82 (BP Location: Left arm, Patient Position: Sitting, Cuff Size: Standard)   Pulse 96   Ht 6' 1\" (1.854 m)   Wt (!) 189 kg (416 lb 6.4 oz)   SpO2 93%   BMI 54.94 kg/m²     Lab Results   Component Value Date    PSA 0.3 01/18/2022          Physical Exam  Vitals reviewed.   Constitutional:       Appearance: Normal appearance. He is normal weight.   HENT:      Head: Normocephalic and atraumatic.   Eyes:      Pupils: Pupils are equal, round, and reactive to light.   Abdominal:      General: Abdomen is flat.   Neurological:      General: No focal deficit present.      Mental Status: He is alert and oriented to person, place, and time.   Psychiatric:         Mood and " Affect: Mood normal.         Thought Content: Thought content normal.             Vasectomy     Date/Time  6/3/2024 3:30 PM     Performed by  Johnson Lazo MD   Authorized by  Johnson Lazo MD     Universal Protocol   Consent: Written consent obtained.  Risks and benefits: risks, benefits and alternatives were discussed  Consent given by: patient  Patient understanding: patient states understanding of the procedure being performed  Patient consent: the patient's understanding of the procedure matches consent given  Procedure consent: procedure consent matches procedure scheduled  Patient identity confirmed: verbally with patient      Local anesthesia used: yes      Anesthesia: local infiltration     Anesthesia   Local anesthesia used: yes  Local Anesthetic: lidocaine 2% with epinephrine  Anesthetic total: 10 mL     Sedation   Patient sedated: yes  Sedation type: anxiolysis        Specimen: yes   Procedure Details   Procedure Notes: The patient signed informed consent and then took valium medication.   They were prepped and draped in sterile fashion.  An exam confirmed paplpable vasa bilaterally  Attention was turned to the left vas deferens.  It was isolated and brought the skin and then local anesthetic was used to infiltrate the skin overlying the vas deferens and also attempted to infiltrating the vaginal sheath itself.  The skin was then opened bluntly with dissecting clamp. The vas was secured with a ring clamp.  The vasal complex was infiltrated with additional local anesthetic.  The vasal sheath was opened and dissected so that the vas was exposed. Clamps were used to secure the proximal and distal aspects of the vas and a segment of vas was sharply excised and sent for pathology. The lumens of the proximal and distal vasal segments were then cauterized.  The proximal vasal segment was buried into sheath tissue which was cauterized to create a fascial interposition. The complex was dunked back into the  scrotum and then pulled back out assess for hemostasis which appeared adequate.    Attention was then turned to the patient's right side. In a similar fashion the vas deferens on the right was isolated, local anesthetic used to numb the skin and tissue, the skin opened, vas clamped, additional local given, vasal sheath opened, segment excised, and proximal and distal lumens cauterized.    The dartos tissue of each skin opening was gently cauterized for hemostasis. The skin was closed with a horizontal chromic suture on each side. Bacitracin was applied to the incisions and the fluffs placed.    Expected and concerning scrotal changes were discussed along with follow up including limited activity for 3 days followed by mild activity and resumption of heavy lifting and running and ejaculating 2 weeks out along with voiding submersion in water until 4 weeks out. The importance of post vasectomy semen analysis was stressed.  Patient Transportation: confirmed  Patient tolerance: patient tolerated the procedure well with no immediate complications             Orders  Orders Placed This Encounter   Procedures    Vasectomy     This order was created via procedure documentation    Semen analysis, post-vasectomy     This is a patient instruction: Please call Central Scheduling [phone] to make an appointment for testing.  Please refer to patient instructions on the Post Vasectomy Form provided to you by your doctor. If you have additional questions on collection of your specimen, please call the Lab Call Center at [phone].         Standing Status:   Future     Standing Expiration Date:   6/3/2025

## 2024-06-03 NOTE — ASSESSMENT & PLAN NOTE
The patient had a vasectomy today without any significant issues. We discussed his plan of care at home. He should try to have minimal activity for 3 days to reduce the risk for bleeding/hematoma. After 3 days he can move as desired but no heavy lifting or running for 2 weeks. No ejaculations for 1-2 weeks. No submerging his incision below water for 2 weeks.  He should use tylenol and NSAIDs (although I asked him minimize NSAID use at first to minimize bleeding risk).  His skin sutures will dissolve on their own. Some skin separation is not uncommon and not concerning. He should contact us for signs of infection, bleeding or persistent pain.  He should ejaculate approximately 30 times before performing a semen analysis. If this does not show sperm or shows rare nonmotile sperm then he is considered infertile but until a semen analysis is performed with these results he should be using protection with intercourse.

## 2024-06-05 PROCEDURE — 88302 TISSUE EXAM BY PATHOLOGIST: CPT | Performed by: PATHOLOGY

## 2024-06-14 ENCOUNTER — HOSPITAL ENCOUNTER (OUTPATIENT)
Dept: MRI IMAGING | Facility: HOSPITAL | Age: 29
End: 2024-06-14
Payer: COMMERCIAL

## 2024-06-14 DIAGNOSIS — M25.562 ACUTE PAIN OF LEFT KNEE: ICD-10-CM

## 2024-06-14 PROCEDURE — 73721 MRI JNT OF LWR EXTRE W/O DYE: CPT

## 2024-07-17 ENCOUNTER — APPOINTMENT (EMERGENCY)
Dept: RADIOLOGY | Facility: HOSPITAL | Age: 29
End: 2024-07-17
Payer: COMMERCIAL

## 2024-07-17 ENCOUNTER — HOSPITAL ENCOUNTER (EMERGENCY)
Facility: HOSPITAL | Age: 29
Discharge: HOME | End: 2024-07-17
Attending: EMERGENCY MEDICINE
Payer: COMMERCIAL

## 2024-07-17 VITALS
DIASTOLIC BLOOD PRESSURE: 61 MMHG | HEART RATE: 92 BPM | SYSTOLIC BLOOD PRESSURE: 130 MMHG | OXYGEN SATURATION: 94 % | TEMPERATURE: 98 F | RESPIRATION RATE: 22 BRPM

## 2024-07-17 DIAGNOSIS — E86.0 DEHYDRATION: ICD-10-CM

## 2024-07-17 DIAGNOSIS — R11.10 VOMITING AND DIARRHEA: Primary | ICD-10-CM

## 2024-07-17 DIAGNOSIS — R19.7 VOMITING AND DIARRHEA: Primary | ICD-10-CM

## 2024-07-17 LAB
ALBUMIN SERPL BCG-MCNC: 4.2 G/DL (ref 3.5–5)
ALP SERPL-CCNC: 71 U/L (ref 34–104)
ALT SERPL W P-5'-P-CCNC: 85 U/L (ref 7–52)
ANION GAP SERPL CALCULATED.3IONS-SCNC: 17 MMOL/L (ref 4–13)
ANION GAP SERPL CALCULATED.3IONS-SCNC: 8 MMOL/L (ref 4–13)
AST SERPL W P-5'-P-CCNC: 52 U/L (ref 13–39)
ATRIAL RATE: 90 BPM
BASOPHILS # BLD AUTO: 0.04 THOUSANDS/ÂΜL (ref 0–0.1)
BASOPHILS NFR BLD AUTO: 0 % (ref 0–1)
BILIRUB SERPL-MCNC: 0.57 MG/DL (ref 0.2–1)
BUN SERPL-MCNC: 10 MG/DL (ref 5–25)
BUN SERPL-MCNC: 9 MG/DL (ref 5–25)
CALCIUM SERPL-MCNC: 8.6 MG/DL (ref 8.4–10.2)
CALCIUM SERPL-MCNC: 9.1 MG/DL (ref 8.4–10.2)
CARDIAC TROPONIN I PNL SERPL HS: 5 NG/L
CHLORIDE SERPL-SCNC: 105 MMOL/L (ref 96–108)
CHLORIDE SERPL-SCNC: 106 MMOL/L (ref 96–108)
CO2 SERPL-SCNC: 13 MMOL/L (ref 21–32)
CO2 SERPL-SCNC: 24 MMOL/L (ref 21–32)
CREAT SERPL-MCNC: 0.61 MG/DL (ref 0.6–1.3)
CREAT SERPL-MCNC: 0.64 MG/DL (ref 0.6–1.3)
EOSINOPHIL # BLD AUTO: 0.37 THOUSAND/ÂΜL (ref 0–0.61)
EOSINOPHIL NFR BLD AUTO: 4 % (ref 0–6)
ERYTHROCYTE [DISTWIDTH] IN BLOOD BY AUTOMATED COUNT: 13.1 % (ref 11.6–15.1)
GFR SERPL CREATININE-BSD FRML MDRD: 134 ML/MIN/1.73SQ M
GFR SERPL CREATININE-BSD FRML MDRD: 136 ML/MIN/1.73SQ M
GLUCOSE SERPL-MCNC: 108 MG/DL (ref 65–140)
GLUCOSE SERPL-MCNC: 112 MG/DL (ref 65–140)
HCT VFR BLD AUTO: 46.2 % (ref 36.5–49.3)
HGB BLD-MCNC: 15.3 G/DL (ref 12–17)
IMM GRANULOCYTES # BLD AUTO: 0.03 THOUSAND/UL (ref 0–0.2)
IMM GRANULOCYTES NFR BLD AUTO: 0 % (ref 0–2)
LIPASE SERPL-CCNC: 11 U/L (ref 11–82)
LYMPHOCYTES # BLD AUTO: 2.38 THOUSANDS/ÂΜL (ref 0.6–4.47)
LYMPHOCYTES NFR BLD AUTO: 26 % (ref 14–44)
MCH RBC QN AUTO: 29.5 PG (ref 26.8–34.3)
MCHC RBC AUTO-ENTMCNC: 33.1 G/DL (ref 31.4–37.4)
MCV RBC AUTO: 89 FL (ref 82–98)
MONOCYTES # BLD AUTO: 0.57 THOUSAND/ÂΜL (ref 0.17–1.22)
MONOCYTES NFR BLD AUTO: 6 % (ref 4–12)
NEUTROPHILS # BLD AUTO: 5.63 THOUSANDS/ÂΜL (ref 1.85–7.62)
NEUTS SEG NFR BLD AUTO: 64 % (ref 43–75)
NRBC BLD AUTO-RTO: 0 /100 WBCS
P AXIS: 63 DEGREES
PLATELET # BLD AUTO: 246 THOUSANDS/UL (ref 149–390)
PMV BLD AUTO: 9.9 FL (ref 8.9–12.7)
POTASSIUM SERPL-SCNC: 3.8 MMOL/L (ref 3.5–5.3)
POTASSIUM SERPL-SCNC: 5.4 MMOL/L (ref 3.5–5.3)
PR INTERVAL: 178 MS
PROT SERPL-MCNC: 7.9 G/DL (ref 6.4–8.4)
QRS AXIS: 40 DEGREES
QRSD INTERVAL: 100 MS
QT INTERVAL: 366 MS
QTC INTERVAL: 447 MS
RBC # BLD AUTO: 5.19 MILLION/UL (ref 3.88–5.62)
SODIUM SERPL-SCNC: 136 MMOL/L (ref 135–147)
SODIUM SERPL-SCNC: 137 MMOL/L (ref 135–147)
T WAVE AXIS: 35 DEGREES
VENTRICULAR RATE: 90 BPM
WBC # BLD AUTO: 9.02 THOUSAND/UL (ref 4.31–10.16)

## 2024-07-17 PROCEDURE — 93010 ELECTROCARDIOGRAM REPORT: CPT | Performed by: INTERNAL MEDICINE

## 2024-07-17 PROCEDURE — 76705 ECHO EXAM OF ABDOMEN: CPT | Performed by: EMERGENCY MEDICINE

## 2024-07-17 PROCEDURE — 80053 COMPREHEN METABOLIC PANEL: CPT

## 2024-07-17 PROCEDURE — 36415 COLL VENOUS BLD VENIPUNCTURE: CPT

## 2024-07-17 PROCEDURE — 85025 COMPLETE CBC W/AUTO DIFF WBC: CPT

## 2024-07-17 PROCEDURE — 80048 BASIC METABOLIC PNL TOTAL CA: CPT

## 2024-07-17 PROCEDURE — 96366 THER/PROPH/DIAG IV INF ADDON: CPT

## 2024-07-17 PROCEDURE — 99285 EMERGENCY DEPT VISIT HI MDM: CPT | Performed by: EMERGENCY MEDICINE

## 2024-07-17 PROCEDURE — 96375 TX/PRO/DX INJ NEW DRUG ADDON: CPT

## 2024-07-17 PROCEDURE — 93005 ELECTROCARDIOGRAM TRACING: CPT

## 2024-07-17 PROCEDURE — 83690 ASSAY OF LIPASE: CPT

## 2024-07-17 PROCEDURE — 99285 EMERGENCY DEPT VISIT HI MDM: CPT

## 2024-07-17 PROCEDURE — 71046 X-RAY EXAM CHEST 2 VIEWS: CPT

## 2024-07-17 PROCEDURE — 96365 THER/PROPH/DIAG IV INF INIT: CPT

## 2024-07-17 PROCEDURE — 84484 ASSAY OF TROPONIN QUANT: CPT

## 2024-07-17 RX ORDER — SODIUM CHLORIDE, SODIUM GLUCONATE, SODIUM ACETATE, POTASSIUM CHLORIDE, MAGNESIUM CHLORIDE, SODIUM PHOSPHATE, DIBASIC, AND POTASSIUM PHOSPHATE .53; .5; .37; .037; .03; .012; .00082 G/100ML; G/100ML; G/100ML; G/100ML; G/100ML; G/100ML; G/100ML
1000 INJECTION, SOLUTION INTRAVENOUS ONCE
Status: COMPLETED | OUTPATIENT
Start: 2024-07-17 | End: 2024-07-17

## 2024-07-17 RX ORDER — DICYCLOMINE HCL 20 MG
20 TABLET ORAL 2 TIMES DAILY
Qty: 14 TABLET | Refills: 0 | Status: SHIPPED | OUTPATIENT
Start: 2024-07-17 | End: 2024-07-24

## 2024-07-17 RX ORDER — DICYCLOMINE HCL 20 MG
20 TABLET ORAL ONCE
Status: COMPLETED | OUTPATIENT
Start: 2024-07-17 | End: 2024-07-17

## 2024-07-17 RX ORDER — LIDOCAINE HYDROCHLORIDE 20 MG/ML
15 SOLUTION OROPHARYNGEAL ONCE
Status: COMPLETED | OUTPATIENT
Start: 2024-07-17 | End: 2024-07-17

## 2024-07-17 RX ORDER — LOPERAMIDE HYDROCHLORIDE 2 MG/1
2 CAPSULE ORAL 4 TIMES DAILY PRN
Qty: 12 CAPSULE | Refills: 0 | Status: SHIPPED | OUTPATIENT
Start: 2024-07-17 | End: 2024-07-20

## 2024-07-17 RX ORDER — LOPERAMIDE HYDROCHLORIDE 2 MG/1
2 CAPSULE ORAL ONCE
Status: COMPLETED | OUTPATIENT
Start: 2024-07-17 | End: 2024-07-17

## 2024-07-17 RX ORDER — MAGNESIUM HYDROXIDE/ALUMINUM HYDROXICE/SIMETHICONE 120; 1200; 1200 MG/30ML; MG/30ML; MG/30ML
30 SUSPENSION ORAL ONCE
Status: COMPLETED | OUTPATIENT
Start: 2024-07-17 | End: 2024-07-17

## 2024-07-17 RX ORDER — ONDANSETRON 2 MG/ML
4 INJECTION INTRAMUSCULAR; INTRAVENOUS ONCE
Status: COMPLETED | OUTPATIENT
Start: 2024-07-17 | End: 2024-07-17

## 2024-07-17 RX ADMIN — ONDANSETRON 4 MG: 2 INJECTION INTRAMUSCULAR; INTRAVENOUS at 10:55

## 2024-07-17 RX ADMIN — SODIUM CHLORIDE, SODIUM GLUCONATE, SODIUM ACETATE, POTASSIUM CHLORIDE, MAGNESIUM CHLORIDE, SODIUM PHOSPHATE, DIBASIC, AND POTASSIUM PHOSPHATE 1000 ML: .53; .5; .37; .037; .03; .012; .00082 INJECTION, SOLUTION INTRAVENOUS at 11:52

## 2024-07-17 RX ADMIN — LOPERAMIDE HYDROCHLORIDE 2 MG: 2 CAPSULE ORAL at 11:50

## 2024-07-17 RX ADMIN — ALUMINUM HYDROXIDE, MAGNESIUM HYDROXIDE, DIMETHICONE 30 ML: 200; 200; 20 LIQUID ORAL at 09:44

## 2024-07-17 RX ADMIN — DICYCLOMINE HYDROCHLORIDE 20 MG: 20 TABLET ORAL at 12:14

## 2024-07-17 RX ADMIN — SODIUM CHLORIDE, SODIUM GLUCONATE, SODIUM ACETATE, POTASSIUM CHLORIDE, MAGNESIUM CHLORIDE, SODIUM PHOSPHATE, DIBASIC, AND POTASSIUM PHOSPHATE 1000 ML: .53; .5; .37; .037; .03; .012; .00082 INJECTION, SOLUTION INTRAVENOUS at 13:06

## 2024-07-17 RX ADMIN — LIDOCAINE HYDROCHLORIDE 15 ML: 20 SOLUTION ORAL at 09:44

## 2024-07-17 NOTE — DISCHARGE INSTRUCTIONS
You have been seen in the emergency department for evaluation of pain and diarrhea. Your workup today was significant for dehydration. For this we have given IV fluids. Please follow up as directed below. Please return to the ED if you develop worsening symptoms, lightheadedness, inability to tolerate food or fluids by mouth, or worsening chest pain.   Please take imodium as needed for diarrhea, you can additionally buy this over the counter.  Please take bentyl as needed for abdominal cramping.

## 2024-07-17 NOTE — ED ATTENDING ATTESTATION
"7/17/2024  I, Jaz Jimenez MD, saw and evaluated the patient. I have discussed the patient with the resident/non-physician practitioner and agree with the resident's/non-physician practitioner's findings, Plan of Care, and MDM as documented in the resident's/non-physician practitioner's note, except where noted. All available labs and Radiology studies were reviewed.  I was present for key portions of any procedure(s) performed by the resident/non-physician practitioner and I was immediately available to provide assistance.       At this point I agree with the current assessment done in the Emergency Department.  I have conducted an independent evaluation of this patient a history and physical is as follows:  Chest pain, vomiting, abdominal cramping.  Patient states that he has had symptoms since last Thursday.  He states that he had developed diarrhea and abdominal cramps.  States that stool was running out like water.  No fevers, but developed some nausea, abdominal cramping which has been more or less constant.  Last night patient states that he has been also having \"eggy\" burps and some burning in his chest, which prompted him to come in to make sure he is not having a heart attack.  No fevers or chills.  No shortness of breath.  Review of systems otherwise negative in 12 systems reviewed.  On exam vital signs were reviewed.  Patient is awake, alert, interactive.  The patient's pupils are equally round reactive to light.  Oropharynx is clear with moist mucous membranes.  Neck is supple and nontender with no adenopathy or JVD.  Heart is regular with no murmurs, rubs, or gallops.  Lungs are clear and equal with no wheezes, rales, or rhonchi.  Abdomen is soft and nontender with no masses, rebound, or guarding. There is no CVA tenderness.  The patient was completely exposed.  There is no skin breakdown.  There are no rashes or skin changes.  Extremities are warm and well perfused with good pulses. The " patient has normal strength, sensation, and cranial nerves.MEDICAL DECISION MAKING    Number and Complexity of Problems  Differential diagnosis: Atypical chest pain, diarrhea, nausea, vomiting, likely viral gastroenteritis, doubt cardiac process, doubt pulmonary embolus, doubt gallbladder or intra-abdominal surgical pathology    Medical Decision Making Data  External documents reviewed: Family medicine office visit from February reviewed  My EKG interpretation: Sinus, narrow complex, normal axis, no evidence of ischemia or ectopy  My CT interpretation:   My X-ray interpretation:   My ultrasound interpretation:     XR chest 2 views   Final Result      No acute cardiopulmonary disease.            Workstation performed: [id number]             Labs Reviewed   COMPREHENSIVE METABOLIC PANEL - Abnormal       Result Value Ref Range Status    Sodium 136  135 - 147 mmol/L Final    Potassium 5.4 (*) 3.5 - 5.3 mmol/L Final    Comment: Slightly Hemolyzed:Results may be affected.    Chloride 106  96 - 108 mmol/L Final    CO2 13 (*) 21 - 32 mmol/L Final    ANION GAP 17 (*) 4 - 13 mmol/L Final    BUN 10  5 - 25 mg/dL Final    Creatinine 0.64  0.60 - 1.30 mg/dL Final    Comment: Standardized to IDMS reference method    Glucose 112  65 - 140 mg/dL Final    Comment: If the patient is fasting, the ADA then defines impaired fasting glucose as > 100 mg/dL and diabetes as > or equal to 123 mg/dL.    Calcium 9.1  8.4 - 10.2 mg/dL Final    AST 52 (*) 13 - 39 U/L Final    Comment: Slightly Hemolyzed:Results may be affected.    ALT 85 (*) 7 - 52 U/L Final    Comment: Specimen collection should occur prior to Sulfasalazine administration due to the potential for falsely depressed results.     Alkaline Phosphatase 71  34 - 104 U/L Final    Total Protein 7.9  6.4 - 8.4 g/dL Final    Albumin 4.2  3.5 - 5.0 g/dL Final    Total Bilirubin 0.57  0.20 - 1.00 mg/dL Final    Comment: Use of this assay is not recommended for patients undergoing  "treatment with eltrombopag due to the potential for falsely elevated results.  N-acetyl-p-benzoquinone imine (metabolite of Acetaminophen) will generate erroneously low results in samples for patients that have taken an overdose of Acetaminophen.    eGFR 134  ml/min/1.73sq m Final    Narrative:     National [employer] guidelines for Chronic Kidney Disease (CKD):     Stage 1 with normal or high GFR (GFR > 90 mL/min/1.73 square meters)    Stage 2 Mild CKD (GFR = 60-89 mL/min/1.73 square meters)    Stage 3A Moderate CKD (GFR = 45-59 mL/min/1.73 square meters)    Stage 3B Moderate CKD (GFR = 30-44 mL/min/1.73 square meters)    Stage 4 Severe CKD (GFR = 15-29 mL/min/1.73 square meters)    Stage 5 End Stage CKD (GFR <15 mL/min/1.73 square meters)  Note: GFR calculation is accurate only with a steady state creatinine   LIPASE - Normal    Lipase 11  11 - 82 u/L Final   HS TROPONIN I 0HR - Normal    hs TnI 0hr 5  \"Refer to ACS Flowchart\"- see link ng/L Final    Comment:                                              Initial (time 0) result  If >=50 ng/L, Myocardial injury suggested ;  Type of myocardial injury and treatment strategy  to be determined.  If 5-49 ng/L, a delta result at 2 hours and or 4 hours will be needed to further evaluate.  If <4 ng/L, and chest pain has been >3 hours since onset, patient may qualify for discharge based on the HEART score in the ED.  If <5 ng/L and <3hours since onset of chest pain, a delta result at 2 hours will be needed to further evaluate.    HS Troponin 99th Percentile URL of a Health Population=12 ng/L with a 95% Confidence Interval of 8-18 ng/L.    Second Troponin (time 2 hours)  If calculated delta >= 20 ng/L,  Myocardial injury suggested ; Type of myocardial injury and treatment strategy to be determined.  If 5-49 ng/L and the calculated delta is 5-19 ng/L, consult medical service for evaluation.  Continue evaluation for ischemia on ecg and other possible etiology and " repeat hs troponin at 4 hours.  If delta is <5 ng/L at 2 hours, consider discharge based on risk stratification via the HEART score (if in ED), or NELDA risk score in IP/Observation.    HS Troponin 99th Percentile URL of a Health Population=12 ng/L with a 95% Confidence Interval of 8-18 ng/L.   CBC AND DIFFERENTIAL    WBC 9.02  4.31 - 10.16 Thousand/uL Final    RBC 5.19  3.88 - 5.62 Million/uL Final    Hemoglobin 15.3  12.0 - 17.0 g/dL Final    Hematocrit 46.2  36.5 - 49.3 % Final    MCV 89  82 - 98 fL Final    MCH 29.5  26.8 - 34.3 pg Final    MCHC 33.1  31.4 - 37.4 g/dL Final    RDW 13.1  11.6 - 15.1 % Final    MPV 9.9  8.9 - 12.7 fL Final    Platelets 246  149 - 390 Thousands/uL Final    nRBC 0  /100 WBCs Final    Segmented % 64  43 - 75 % Final    Immature Grans % 0  0 - 2 % Final    Lymphocytes % 26  14 - 44 % Final    Monocytes % 6  4 - 12 % Final    Eosinophils Relative 4  0 - 6 % Final    Basophils Relative 0  0 - 1 % Final    Absolute Neutrophils 5.63  1.85 - 7.62 Thousands/µL Final    Absolute Immature Grans 0.03  0.00 - 0.20 Thousand/uL Final    Absolute Lymphocytes 2.38  0.60 - 4.47 Thousands/µL Final    Absolute Monocytes 0.57  0.17 - 1.22 Thousand/µL Final    Eosinophils Absolute 0.37  0.00 - 0.61 Thousand/µL Final    Basophils Absolute 0.04  0.00 - 0.10 Thousands/µL Final   BASIC METABOLIC PANEL    Sodium 137  135 - 147 mmol/L Final    Potassium 3.8  3.5 - 5.3 mmol/L Final    Chloride 105  96 - 108 mmol/L Final    CO2 24  21 - 32 mmol/L Final    ANION GAP 8  4 - 13 mmol/L Final    BUN 9  5 - 25 mg/dL Final    Creatinine 0.61  0.60 - 1.30 mg/dL Final    Comment: Standardized to IDMS reference method    Glucose 108  65 - 140 mg/dL Final    Comment: If the patient is fasting, the ADA then defines impaired fasting glucose as > 100 mg/dL and diabetes as > or equal to 123 mg/dL.    Calcium 8.6  8.4 - 10.2 mg/dL Final    eGFR 136  ml/min/1.73sq m Final    Narrative:     National [employer] Foundation  guidelines for Chronic Kidney Disease (CKD):     Stage 1 with normal or high GFR (GFR > 90 mL/min/1.73 square meters)    Stage 2 Mild CKD (GFR = 60-89 mL/min/1.73 square meters)    Stage 3A Moderate CKD (GFR = 45-59 mL/min/1.73 square meters)    Stage 3B Moderate CKD (GFR = 30-44 mL/min/1.73 square meters)    Stage 4 Severe CKD (GFR = 15-29 mL/min/1.73 square meters)    Stage 5 End Stage CKD (GFR <15 mL/min/1.73 square meters)  Note: GFR calculation is accurate only with a steady state creatinine       Labs reviewed by me are significant for: Elevated anion gap, low bicarb, likely secondary to diarrhea and dehydration.  Will bolus and repeat labs    Clinical decision rules/scores are significant for:     Discussed case with:   Considered admission for:     Treatment and Disposition  ED course: Patient seen and examined, EKG reassuring, will treat symptomatically and reassess  Shared decision making:   Code status:   Diagnosis 1 contraction alkalosis, 2 metabolic acidosis, 3 diarrhea, 4 vomiting.  Patient's metabolic derangements were resolved prior to discharge  ED Course  ED Course as of 07/18/24 0802   Wed Jul 17, 2024   1454 Patient still pending repeat labs         Critical Care Time  Procedures

## 2024-07-17 NOTE — Clinical Note
Hugo Hanna was seen and treated in our emergency department on 7/17/2024.                Diagnosis:     Hugo  may return to work on return date.    He may return on this date: 07/19/2024         If you have any questions or concerns, please don't hesitate to call.      Lurdes Poe MD    ______________________________           _______________          _______________  Hospital Representative                              Date                                Time

## 2024-07-18 NOTE — ED PROVIDER NOTES
"History  Chief Complaint   Patient presents with    Chest Pain     Pt states that he has been having substernal chest pain for a few days and pain worse this am. Pt states that pain does not radiate. Pt also c/o nausea and feeling tingly in his hands     Patient is a 28-year-old male, past medical history significant for type 2 diabetes, presenting today for evaluation of chest pain.  Patient states that he started on Thursday with chest discomfort.  In the days prior to this, he had been having excessive amounts of diarrhea.  Patient states that on Friday, the diarrhea improved as well as the chest pain.  Over the weekend he had recurrence of some symptoms and then got worse at the beginning of this week.  Patient states that the pain got much worse this morning and lasted longer than it has been.  Patient still reports some chest discomfort, but states that it is improved from when it initially started.  He states that the severe pain lasted approximately 1 hour, he states it is located in the center of his chest.  He denies radiation to his back, his extremities, or into his abdomen.  He does report associated abdominal cramping with the diarrhea as well as \"sulfur burps\", and nausea.  He denies association of shortness of breath.  He denies association with food ingestion or exertion.  He states that prior to the episode this morning, previous episodes have only been lasting approximately 10 minutes.  He did not take anything for the pain prior to arrival.  He states that he has been under some increased stress at home and is unsure if these are panic attacks.  Patient does have a family history significant for cardiac disease.  He denies smoking history or knowledge of hyperlipidemia.  Patient states that prior to this current episode of diarrhea and abdominal cramping, he had a previous episode a few months ago.  He states that he originally followed with GI, but the episodes went away, so he discontinued his " follow-up.  He denies any fevers, chills, sick contacts, recent travel, recent suspicious food intake, or recent antibiotics.  He denies heavy lifting, or change in exertional activities.  He states that the stool was initially semiformed, but has become more liquidy as he has not been eating many solid foods.  Patient does take Ozempic for diabetes, but denies recent increase in dosages or history of symptoms associated with taking the medication.          Prior to Admission Medications   Prescriptions Last Dose Informant Patient Reported? Taking?   ALPRAZolam (XANAX) 1 mg tablet  Self No No   Sig: Take 1 tablet (1 mg total) by mouth daily at bedtime as needed for anxiety Take 1 hour before appointment   Blood Glucose Monitoring Suppl (Contour Next Gen Monitor) DONNA  Self Yes No   Sig: CHECK BLOOD SUGARS TWICE DAILY IN MORNING AND EVENING   Blood Glucose Monitoring Suppl (Contour Next Monitor) w/Device KIT  Self No No   Sig: Check blood sugars twice daily in morning and evening   Patient not taking: Reported on 3/12/2024   OneTouch Delica Lancets 33G MISC  Self No No   Sig: Check blood sugars twice daily. Please substitute with appropriate alternative as covered by patient's insurance. Dx: E11.65   Patient not taking: Reported on 5/30/2024   brompheniramine-pseudoephedrine-DM 30-2-10 MG/5ML syrup  Self No No   Sig: Take 10 mL by mouth 3 (three) times a day as needed for congestion or cough   Patient not taking: Reported on 5/30/2024   dexamethasone (DECADRON) 4 mg tablet  Self No No   Sig: Take 1 tablet (4 mg total) by mouth 2 (two) times a day with meals   Patient not taking: Reported on 5/30/2024   glucose blood (OneTouch Verio) test strip  Self No No   Sig: Check blood sugars twice daily. Please substitute with appropriate alternative as covered by patient's insurance. Dx: E11.65   Patient not taking: Reported on 3/12/2024   metFORMIN (GLUCOPHAGE-XR) 750 mg 24 hr tablet  Self No No   Sig: TAKE 1 TABLET BY MOUTH  DAILY WITH DINNER   Patient not taking: Reported on 5/30/2024   naproxen sodium (ALEVE) 220 MG tablet  Self Yes No   Sig: Take 220 mg by mouth if needed for mild pain   Patient not taking: Reported on 5/30/2024   ofloxacin (OCUFLOX) 0.3 % ophthalmic solution  Self No No   Sig: Administer 1 drop to both eyes 4 (four) times a day X 5 days   Patient not taking: Reported on 5/30/2024   semaglutide, 0.25 or 0.5 mg/dose, (Ozempic, 0.25 or 0.5 MG/DOSE,) 2 mg/3 mL injection pen  Self No No   Sig: Take 0.25 mg subcutaneously once a week for 4 weeks then increase to 0.5 mg subcutaneously once a week.   Patient not taking: Reported on 3/12/2024      Facility-Administered Medications: None       Past Medical History:   Diagnosis Date    Back pain     Testicle pain        Past Surgical History:   Procedure Laterality Date    FL INJECTION RIGHT SHOULDER (ARTHROGRAM)  2/6/2023       Family History   Problem Relation Age of Onset    Diabetes Mother     Cancer Mother         brain    Heart attack Mother     Snoring Father         suspected MYRIAM    Stroke Father     Cancer Father         colon and throat    Breast cancer Family      I have reviewed and agree with the history as documented.    E-Cigarette/Vaping    E-Cigarette Use Never User      E-Cigarette/Vaping Substances     Social History     Tobacco Use    Smoking status: Never    Smokeless tobacco: Never   Vaping Use    Vaping status: Never Used   Substance Use Topics    Alcohol use: Yes     Comment: socially    Drug use: No        Review of Systems    Physical Exam  ED Triage Vitals [07/17/24 0813]   Temperature Pulse Respirations Blood Pressure SpO2   98 °F (36.7 °C) 89 20 133/81 96 %      Temp Source Heart Rate Source Patient Position - Orthostatic VS BP Location FiO2 (%)   Temporal Monitor Lying Left arm --      Pain Score       --             Orthostatic Vital Signs  Vitals:    07/17/24 1100 07/17/24 1200 07/17/24 1300 07/17/24 1400   BP: 143/64 134/61 126/63 130/61    Pulse: 98 96 94 92   Patient Position - Orthostatic VS: Lying Lying Lying Lying       Physical Exam  Vitals and nursing note reviewed.   Constitutional:       General: He is not in acute distress.     Appearance: Normal appearance. He is not ill-appearing or toxic-appearing.   HENT:      Head: Normocephalic and atraumatic.   Eyes:      General: No scleral icterus.     Extraocular Movements: Extraocular movements intact.   Cardiovascular:      Rate and Rhythm: Normal rate and regular rhythm.      Pulses: Normal pulses.      Heart sounds: Normal heart sounds. No murmur heard.  Pulmonary:      Effort: Pulmonary effort is normal. No respiratory distress.      Breath sounds: Normal breath sounds. No wheezing or rhonchi.   Abdominal:      General: Abdomen is flat. There is no distension.      Palpations: Abdomen is soft.      Tenderness: There is abdominal tenderness (Mildly tender in the epigastrium).   Musculoskeletal:         General: Normal range of motion.      Cervical back: Normal range of motion.      Right lower leg: No edema.      Left lower leg: No edema.   Skin:     General: Skin is warm.      Capillary Refill: Capillary refill takes less than 2 seconds.   Neurological:      General: No focal deficit present.      Mental Status: He is alert.      Cranial Nerves: No cranial nerve deficit.      Sensory: No sensory deficit.      Motor: No weakness.      Gait: Gait normal.   Psychiatric:         Mood and Affect: Mood normal.         Behavior: Behavior normal.         ED Medications  Medications   aluminum-magnesium hydroxide-simethicone (MAALOX) oral suspension 30 mL (30 mL Oral Given 7/17/24 0944)   Lidocaine Viscous HCl (XYLOCAINE) 2 % mucosal solution 15 mL (15 mL Swish & Spit Given 7/17/24 0944)   loperamide (IMODIUM) capsule 2 mg (2 mg Oral Given 7/17/24 1150)   ondansetron (ZOFRAN) injection 4 mg (4 mg Intravenous Given 7/17/24 1055)   multi-electrolyte (ISOLYTE-S PH 7.4) bolus 1,000 mL (0 mL Intravenous  Stopped 7/17/24 1406)   multi-electrolyte (ISOLYTE-S PH 7.4) bolus 1,000 mL (0 mL Intravenous Stopped 7/17/24 1252)   dicyclomine (BENTYL) tablet 20 mg (20 mg Oral Given 7/17/24 1214)       Diagnostic Studies  Results Reviewed       Procedure Component Value Units Date/Time    Basic metabolic panel [[id number]] Collected: 07/17/24 1421    Lab Status: Final result Specimen: Blood from Hand, Left Updated: 07/17/24 1525     Sodium 137 mmol/L      Potassium 3.8 mmol/L      Chloride 105 mmol/L      CO2 24 mmol/L      ANION GAP 8 mmol/L      BUN 9 mg/dL      Creatinine 0.61 mg/dL      Glucose 108 mg/dL      Calcium 8.6 mg/dL      eGFR 136 ml/min/1.73sq m     Narrative:      National Kid[employer] Foundation guidelines for Chronic Kidney Disease (CKD):     Stage 1 with normal or high GFR (GFR > 90 mL/min/1.73 square meters)    Stage 2 Mild CKD (GFR = 60-89 mL/min/1.73 square meters)    Stage 3A Moderate CKD (GFR = 45-59 mL/min/1.73 square meters)    Stage 3B Moderate CKD (GFR = 30-44 mL/min/1.73 square meters)    Stage 4 Severe CKD (GFR = 15-29 mL/min/1.73 square meters)    Stage 5 End Stage CKD (GFR <15 mL/min/1.73 square meters)  Note: GFR calculation is accurate only with a steady state creatinine    Comprehensive metabolic panel [[id number]]  (Abnormal) Collected: 07/17/24 0909    Lab Status: Final result Specimen: Blood from Arm, Right Updated: 07/17/24 1121     Sodium 136 mmol/L      Potassium 5.4 mmol/L      Chloride 106 mmol/L      CO2 13 mmol/L      ANION GAP 17 mmol/L      BUN 10 mg/dL      Creatinine 0.64 mg/dL      Glucose 112 mg/dL      Calcium 9.1 mg/dL      AST 52 U/L      ALT 85 U/L      Alkaline Phosphatase 71 U/L      Total Protein 7.9 g/dL      Albumin 4.2 g/dL      Total Bilirubin 0.57 mg/dL      eGFR 134 ml/min/1.73sq m     Narrative:      National Kid[employer] Disease Foundation guidelines for Chronic Kidney Disease (CKD):     Stage 1 with normal or high GFR (GFR > 90 mL/min/1.73 square meters)    Stage 2  Mild CKD (GFR = 60-89 mL/min/1.73 square meters)    Stage 3A Moderate CKD (GFR = 45-59 mL/min/1.73 square meters)    Stage 3B Moderate CKD (GFR = 30-44 mL/min/1.73 square meters)    Stage 4 Severe CKD (GFR = 15-29 mL/min/1.73 square meters)    Stage 5 End Stage CKD (GFR <15 mL/min/1.73 square meters)  Note: GFR calculation is accurate only with a steady state creatinine    Lipase [[id number]]  (Normal) Collected: 07/17/24 0909    Lab Status: Final result Specimen: Blood from Arm, Right Updated: 07/17/24 1121     Lipase 11 u/L     HS Troponin 0hr (reflex protocol) [[id number]]  (Normal) Collected: 07/17/24 0909    Lab Status: Final result Specimen: Blood from Arm, Right Updated: 07/17/24 0940     hs TnI 0hr 5 ng/L     CBC and differential [[id number]] Collected: 07/17/24 0909    Lab Status: Final result Specimen: Blood from Arm, Right Updated: 07/17/24 0918     WBC 9.02 Thousand/uL      RBC 5.19 Million/uL      Hemoglobin 15.3 g/dL      Hematocrit 46.2 %      MCV 89 fL      MCH 29.5 pg      MCHC 33.1 g/dL      RDW 13.1 %      MPV 9.9 fL      Platelets 246 Thousands/uL      nRBC 0 /100 WBCs      Segmented % 64 %      Immature Grans % 0 %      Lymphocytes % 26 %      Monocytes % 6 %      Eosinophils Relative 4 %      Basophils Relative 0 %      Absolute Neutrophils 5.63 Thousands/µL      Absolute Immature Grans 0.03 Thousand/uL      Absolute Lymphocytes 2.38 Thousands/µL      Absolute Monocytes 0.57 Thousand/µL      Eosinophils Absolute 0.37 Thousand/µL      Basophils Absolute 0.04 Thousands/µL                    XR chest 2 views   Final Result by Gold Aldrich MD (07/17 0934)      No acute cardiopulmonary disease.            Workstation performed: [id number]               Procedures  POC Biliary US    Date/Time: 7/17/2024 9:00 AM    Performed by: Lurdes Poe MD  Authorized by: Lurdes Poe MD    Patient location:  ED  Performed by:  Resident  Other Assisting Provider: No    Procedure details:     Exam Type:   Diagnostic    Indications: epigastric pain and nausea      Assessment for:  Cholelithiasis and cholecystitis    Views obtained: gallbladder (transverse and longitudinal)      Views obtained comment:  Very limited    Image availability:  Images available in PACS  Findings:     Cholelithiasis: not identified      Common bile duct:  Unable to visualize    Gallbladder wall:  Normal (Limited visualization)    Pericholecystic fluid: not identified      Sonographic Kohli's sign: negative    Interpretation:     Biliary ultrasound impressions: indeterminate    Comments:      Technically difficult exam secondary to body habitus        ED Course  ED Course as of 07/17/24 2326   Wed Jul 17, 2024   0846 Patient seen and evaluated by me  DDx: Gastroenteritis, reflux, due to episodic nature-there is concern for underlying chronic gastroenterologic disorders such as IBS or IBD.  Doubt ACS with associated symptoms, but patient does have risk factors so will workup.  No signs or symptoms concerning for PE.  Patient does have some epigastric tenderness and is additionally on Ozempic which raises concern for pancreatitis.  Workup and plan: CBC, CMP, lipase, troponin, EKG, chest x-ray.   0938 CBC and differential  Normal   0938 XR chest 2 views  No acute cardiopulmonary disease   1011 hs TnI 0hr: 5   1122 LIPASE: 11   1122 Potassium(!): 5.4  Moderately hemolyzed   1130 Carbon Dioxide(!): 13   1131 ANION GAP(!): 17   1215 Labs concerning for dehydration, will hydrate with IVF.    1526 Carbon Dioxide: 24   1526 ANION GAP: 8   1527 Patient discharged at this time, given return precautions and follow up information. Given Rx for Imodium and Bentyl and follow up with gastroenterology. Patient and/or parent report understanding, all questions answered.                HEART Risk Score      Flowsheet Row Most Recent Value   Heart Score Risk Calculator    History 0 Filed at: 07/17/2024 1012   ECG 0 Filed at: 07/17/2024 1012   Age 0 Filed at:  07/17/2024 1012   Risk Factors 1 Filed at: 07/17/2024 1012   Troponin 0 Filed at: 07/17/2024 1012   HEART Score 1 Filed at: 07/17/2024 1012                        SBIRT 22yo+      Flowsheet Row Most Recent Value   Initial Alcohol Screen: US AUDIT-C     1. How often do you have a drink containing alcohol? 1 Filed at: 07/17/2024 0815   2. How many drinks containing alcohol do you have on a typical day you are drinking?  0 Filed at: 07/17/2024 0815   3a. Male UNDER 65: How often do you have five or more drinks on one occasion? 0 Filed at: 07/17/2024 0815   Audit-C Score 1 Filed at: 07/17/2024 0815                  Medical Decision Making  Please see ED course above regarding details of the MDM    Amount and/or Complexity of Data Reviewed  Labs: ordered. Decision-making details documented in ED Course.  Radiology: ordered. Decision-making details documented in ED Course.    Risk  OTC drugs.  Prescription drug management.          Disposition  Final diagnoses:   Vomiting and diarrhea   Dehydration     Time reflects when diagnosis was documented in both MDM as applicable and the Disposition within this note       Time User Action Codes Description Comment    7/17/2024  2:20 PM Lurdes Poe Add [R11.10,  R19.7] Vomiting and diarrhea     7/17/2024  2:20 PM Lurdes Poe Add [E86.0] Dehydration           ED Disposition       ED Disposition   Discharge    Condition   Stable    Date/Time   Wed Jul 17, 2024 1527    Comment   Hugo Hanna discharge to home/self care.                   Follow-up Information    None         Discharge Medication List as of 7/17/2024  3:27 PM        START taking these medications    Details   dicyclomine (BENTYL) 20 mg tablet Take 1 tablet (20 mg total) by mouth 2 (two) times a day for 7 days, Starting Wed 7/17/2024, Until Wed 7/24/2024, Normal      loperamide (IMODIUM) 2 mg capsule Take 1 capsule (2 mg total) by mouth 4 (four) times a day as needed for diarrhea for up to 3 days, Starting  Wed 7/17/2024, Until Sat 7/20/2024 at 2359, Normal           CONTINUE these medications which have NOT CHANGED    Details   ALPRAZolam (XANAX) 1 mg tablet Take 1 tablet (1 mg total) by mouth daily at bedtime as needed for anxiety Take 1 hour before appointment, Starting Tue 3/12/2024, Normal      Blood Glucose Monitoring Suppl (Contour Next Gen Monitor) DONNA CHECK BLOOD SUGARS TWICE DAILY IN MORNING AND EVENING, Historical Med      Blood Glucose Monitoring Suppl (Contour Next Monitor) w/Device KIT Check blood sugars twice daily in morning and evening, Normal      brompheniramine-pseudoephedrine-DM 30-2-10 MG/5ML syrup Take 10 mL by mouth 3 (three) times a day as needed for congestion or cough, Starting Sun 2/25/2024, Normal      dexamethasone (DECADRON) 4 mg tablet Take 1 tablet (4 mg total) by mouth 2 (two) times a day with meals, Starting Mon 2/19/2024, Normal      glucose blood (OneTouch Verio) test strip Check blood sugars twice daily. Please substitute with appropriate alternative as covered by patient's insurance. Dx: E11.65, Normal      metFORMIN (GLUCOPHAGE-XR) 750 mg 24 hr tablet TAKE 1 TABLET BY MOUTH DAILY WITH DINNER, Starting Sun 1/7/2024, Normal      naproxen sodium (ALEVE) 220 MG tablet Take 220 mg by mouth if needed for mild pain, Historical Med      ofloxacin (OCUFLOX) 0.3 % ophthalmic solution Administer 1 drop to both eyes 4 (four) times a day X 5 days, Starting Mon 2/19/2024, Normal      OneTouch Delica Lancets 33G MISC Check blood sugars twice daily. Please substitute with appropriate alternative as covered by patient's insurance. Dx: E11.65, Normal      semaglutide, 0.25 or 0.5 mg/dose, (Ozempic, 0.25 or 0.5 MG/DOSE,) 2 mg/3 mL injection pen Take 0.25 mg subcutaneously once a week for 4 weeks then increase to 0.5 mg subcutaneously once a week., Normal               PDMP Review       None             ED Provider  Attending physically available and evaluated Hugo Hanna. I managed the  patient along with the ED Attending.    Electronically Signed by           Lurdes Poe MD  07/17/24 1073       Lurdes Poe MD  07/17/24 4161

## 2024-09-04 ENCOUNTER — APPOINTMENT (OUTPATIENT)
Dept: LAB | Facility: CLINIC | Age: 29
End: 2024-09-04
Payer: COMMERCIAL

## 2024-09-04 ENCOUNTER — LAB (OUTPATIENT)
Dept: LAB | Facility: CLINIC | Age: 29
End: 2024-09-04
Payer: COMMERCIAL

## 2024-09-04 ENCOUNTER — OFFICE VISIT (OUTPATIENT)
Dept: GASTROENTEROLOGY | Facility: CLINIC | Age: 29
End: 2024-09-04
Payer: COMMERCIAL

## 2024-09-04 VITALS
WEIGHT: 315 LBS | HEIGHT: 73 IN | SYSTOLIC BLOOD PRESSURE: 122 MMHG | TEMPERATURE: 97.5 F | DIASTOLIC BLOOD PRESSURE: 82 MMHG | BODY MASS INDEX: 41.75 KG/M2

## 2024-09-04 DIAGNOSIS — R79.89 ELEVATED LIVER FUNCTION TESTS: ICD-10-CM

## 2024-09-04 DIAGNOSIS — Z00.8 HEALTH EXAMINATION IN POPULATION SURVEY: ICD-10-CM

## 2024-09-04 DIAGNOSIS — R11.10 VOMITING AND DIARRHEA: ICD-10-CM

## 2024-09-04 DIAGNOSIS — Z30.2 ENCOUNTER FOR STERILIZATION: ICD-10-CM

## 2024-09-04 DIAGNOSIS — Z79.899 MEDICATION MANAGEMENT: ICD-10-CM

## 2024-09-04 DIAGNOSIS — K76.0 FATTY LIVER: Primary | ICD-10-CM

## 2024-09-04 DIAGNOSIS — R19.7 VOMITING AND DIARRHEA: ICD-10-CM

## 2024-09-04 DIAGNOSIS — E66.9 TYPE 2 DIABETES MELLITUS WITH OBESITY  (HCC): ICD-10-CM

## 2024-09-04 DIAGNOSIS — E11.69 TYPE 2 DIABETES MELLITUS WITH OBESITY  (HCC): ICD-10-CM

## 2024-09-04 LAB
ANA SER QL IA: POSITIVE
ANION GAP SERPL CALCULATED.3IONS-SCNC: 8 MMOL/L (ref 4–13)
BUN SERPL-MCNC: 12 MG/DL (ref 5–25)
CALCIUM SERPL-MCNC: 9.4 MG/DL (ref 8.4–10.2)
CHLORIDE SERPL-SCNC: 101 MMOL/L (ref 96–108)
CHOLEST SERPL-MCNC: 175 MG/DL
CO2 SERPL-SCNC: 25 MMOL/L (ref 21–32)
CREAT SERPL-MCNC: 0.7 MG/DL (ref 0.6–1.3)
DEPRECATED CD4 CELLS/CD8 CELLS BLD: 2 ML
EST. AVERAGE GLUCOSE BLD GHB EST-MCNC: 200 MG/DL
FERRITIN SERPL-MCNC: 483 NG/ML (ref 24–336)
GFR SERPL CREATININE-BSD FRML MDRD: 129 ML/MIN/1.73SQ M
GLUCOSE P FAST SERPL-MCNC: 309 MG/DL (ref 65–99)
HAV AB SER QL IA: NORMAL
HBA1C MFR BLD: 8.6 %
HBV SURFACE AB SER-ACNC: 18.4 MIU/ML
HBV SURFACE AG SER QL: NORMAL
HDLC SERPL-MCNC: 29 MG/DL
IGA SERPL-MCNC: 253 MG/DL (ref 66–433)
IGG SERPL-MCNC: 1417 MG/DL (ref 635–1741)
IGM SERPL-MCNC: 103 MG/DL (ref 45–281)
IRON SATN MFR SERPL: 26 % (ref 15–50)
IRON SERPL-MCNC: 73 UG/DL (ref 50–212)
LDLC SERPL CALC-MCNC: 111 MG/DL (ref 0–100)
NONHDLC SERPL-MCNC: 146 MG/DL
POTASSIUM SERPL-SCNC: 3.9 MMOL/L (ref 3.5–5.3)
SODIUM SERPL-SCNC: 134 MMOL/L (ref 135–147)
SPERM MOTILE SMN QL MICRO: NORMAL
TIBC SERPL-MCNC: 285 UG/DL (ref 250–450)
TRIGL SERPL-MCNC: 176 MG/DL
UIBC SERPL-MCNC: 212 UG/DL (ref 155–355)

## 2024-09-04 PROCEDURE — 86039 ANTINUCLEAR ANTIBODIES (ANA): CPT

## 2024-09-04 PROCEDURE — 82104 ALPHA-1-ANTITRYPSIN PHENO: CPT

## 2024-09-04 PROCEDURE — 82728 ASSAY OF FERRITIN: CPT

## 2024-09-04 PROCEDURE — 86381 MITOCHONDRIAL ANTIBODY EACH: CPT

## 2024-09-04 PROCEDURE — 80061 LIPID PANEL: CPT

## 2024-09-04 PROCEDURE — 80048 BASIC METABOLIC PNL TOTAL CA: CPT

## 2024-09-04 PROCEDURE — 89321 SEMEN ANAL SPERM DETECTION: CPT

## 2024-09-04 PROCEDURE — 86708 HEPATITIS A ANTIBODY: CPT

## 2024-09-04 PROCEDURE — 83036 HEMOGLOBIN GLYCOSYLATED A1C: CPT

## 2024-09-04 PROCEDURE — 83550 IRON BINDING TEST: CPT

## 2024-09-04 PROCEDURE — 86015 ACTIN ANTIBODY EACH: CPT

## 2024-09-04 PROCEDURE — 87340 HEPATITIS B SURFACE AG IA: CPT

## 2024-09-04 PROCEDURE — 36415 COLL VENOUS BLD VENIPUNCTURE: CPT

## 2024-09-04 PROCEDURE — 82784 ASSAY IGA/IGD/IGG/IGM EACH: CPT

## 2024-09-04 PROCEDURE — 99214 OFFICE O/P EST MOD 30 MIN: CPT | Performed by: INTERNAL MEDICINE

## 2024-09-04 PROCEDURE — 86706 HEP B SURFACE ANTIBODY: CPT

## 2024-09-04 PROCEDURE — 83540 ASSAY OF IRON: CPT

## 2024-09-04 PROCEDURE — 82103 ALPHA-1-ANTITRYPSIN TOTAL: CPT

## 2024-09-04 PROCEDURE — 86038 ANTINUCLEAR ANTIBODIES: CPT

## 2024-09-04 NOTE — PROGRESS NOTES
[facility] - Outpatient Follow-up Note  Hugo Hanna 28 y.o. male MRN: [id number]  Encounter: [id number]          ASSESSMENT AND PLAN:      Post prandial abdominal pain  Abdominal cramping  Intermittent diarrhea  Overall of the symptoms are getting significantly better since being of the Ozempic.  Occasionally continues to have postprandial abdominal discomfort, cramping.  Does not feel like he needs any medication for the same however does have prescription of as needed dicyclomine.  Does not complain of severe diarrhea at present.  Denies any alarm signs like unintentional weight loss, blood in stool.  He has not been able to identify any particular dietary triggers.    Suspect symptoms of postprandial abdominal pain, cramping and episode of acute diarrheal episode in July related to increasing dose of Ozempic at the time.  Since being off he continues to improve.     In the past, he has had testing for celiac disease, H Pylori which was negative.     Will recommend him to watch his symptoms for another 4 weeks and let us know if he has any worsening.  At that time we will consider endoscopic evaluation +/- gastric emptying study.  For now we will obtain RUQ ultrasound to rule out gallbladder etiology given postprandial nature of symptoms. Will check fecal calprotectin to rule out inflammation.     4.  Fatty liver  5.  Elevated transaminases  Patient has had intermittently elevated transaminases at least since 2021.  Most recently checked on 7/17/2024 with AST 52, ALT 85, ALP 71, T. bili 0.5.  Most likely etiology is underlying fatty liver disease which was seen on CT abdomen done in July 2024 as well.  Will rule out other etiologies like autoimmune condition, hepatitis A, B, iron panel.  Prior hepatitis C testing was negative.  Will also obtain elastography to assess for any scarring/fibrosis.    Follow-up in 3 months or sooner.    ______________________________________________________________________    SUBJECTIVE:      28-year-old male with history including but not limited to morbid obesity previously on Ozempic who presents for follow-up of GI complaints.    Patient reports that he was on Ozempic earlier this year, in June 2024 had increased the dose.  This led to significant GI complaints.  Had significant abdominal pain, nausea, profuse diarrhea.  He stopped Ozempic for 1 week however symptoms continued.  Symptoms got severe to the point that he felt extremely weak, chest pain, slurred speech ultimately requiring ER visit On 7/17/2024.  In the ER he had blood work done which showed normal CBC, lipase.  CMP showed potassium 5.4, creatinine 0.6, AST 52, ALT 85.  He also had CT abdomen pelvis which showed no acute inflammatory processes in the abdomen, enlarged fatty liver, splenomegaly.  He subsequently discussed with primary care provider and has been off the Ozempic.  Was referred by primary care provider to see GI today.    On my encounter patient reports that ever since being off the Ozempic he has been feeling significantly better in terms of bowel complaints.  Occasionally will still have abdominal cramping, 1 or 2 loose bowel movements every day but for the most part all of his symptoms have completely resolved.  He has been off the Ozempic for almost 8 weeks now.  Reports that occasionally cramping does happen postprandially.  Since being off the Ozempic he has made dietary modifications because of his symptoms and has been able to lose weight.    He has never had any prior EGD or colonoscopy.     Denies any significant aggressive alcohol use history, tobacco use history.  Denies any family history of liver disease Except liver cancer/colon cancer in grandfather at elderly age.    REVIEW OF SYSTEMS IS OTHERWISE NEGATIVE.      Historical Information   Past Medical History:   Diagnosis Date   • Back pain    • Testicle pain   "    Past Surgical History:   Procedure Laterality Date   • FL INJECTION RIGHT SHOULDER (ARTHROGRAM)  02/06/2023   • VASECTOMY  06/04/2024     Social History   Social History     Substance and Sexual Activity   Alcohol Use Yes    Comment: socially     Social History     Substance and Sexual Activity   Drug Use No     Social History     Tobacco Use   Smoking Status Never   Smokeless Tobacco Never     Family History   Problem Relation Age of Onset   • Diabetes Mother    • Cancer Mother         brain   • Heart attack Mother    • Snoring Father         suspected MYRIAM   • Stroke Father    • Cancer Father         colon and throat   • Breast cancer Family        Meds/Allergies       Current Outpatient Medications:   •  ALPRAZolam (XANAX) 1 mg tablet  •  Blood Glucose Monitoring Suppl (Contour Next Gen Monitor) DONNA  •  dicyclomine (BENTYL) 20 mg tablet    Allergies   Allergen Reactions   • Shellfish Allergy - Food Allergy Anaphylaxis   • Other      Seasonal  pollen           Objective     Blood pressure 122/82, temperature 97.5 °F (36.4 °C), temperature source Tympanic, height 6' 1\" (1.854 m), weight (!) 186 kg (409 lb). Body mass index is 53.96 kg/m².      PHYSICAL EXAM:      General Appearance:   Alert, cooperative, no distress   HEENT:   Normocephalic    Respi:   No labored breathing, able to speak in full sentences   Heart::   Normal rate on pulse palpation   Abdomen:   Non tender   Genitalia:   Deferred    Rectal:   Deferred    Extremities:  No cyanosis, clubbing or edema    Pulses:  2+ and symmetric    Skin:  No jaundice   Lymph nodes:  No palpable cervical lymphadenopathy        Lab Results:   No visits with results within 1 Day(s) from this visit.   Latest known visit with results is:   Admission on 07/17/2024, Discharged on 07/17/2024   Component Date Value   • Ventricular Rate 07/17/2024 90    • Atrial Rate 07/17/2024 90    • WV Interval 07/17/2024 178    • QRSD Interval 07/17/2024 100    • QT Interval 07/17/2024 " 366    • QTC Interval 07/17/2024 447    • P Axis 07/17/2024 63    • QRS Axis 07/17/2024 40    • T Wave Axis 07/17/2024 35    • WBC 07/17/2024 9.02    • RBC 07/17/2024 5.19    • Hemoglobin 07/17/2024 15.3    • Hematocrit 07/17/2024 46.2    • MCV 07/17/2024 89    • MCH 07/17/2024 29.5    • MCHC 07/17/2024 33.1    • RDW 07/17/2024 13.1    • MPV 07/17/2024 9.9    • Platelets 07/17/2024 246    • nRBC 07/17/2024 0    • Segmented % 07/17/2024 64    • Immature Grans % 07/17/2024 0    • Lymphocytes % 07/17/2024 26    • Monocytes % 07/17/2024 6    • Eosinophils Relative 07/17/2024 4    • Basophils Relative 07/17/2024 0    • Absolute Neutrophils 07/17/2024 5.63    • Absolute Immature Grans 07/17/2024 0.03    • Absolute Lymphocytes 07/17/2024 2.38    • Absolute Monocytes 07/17/2024 0.57    • Eosinophils Absolute 07/17/2024 0.37    • Basophils Absolute 07/17/2024 0.04    • Sodium 07/17/2024 136    • Potassium 07/17/2024 5.4 (H)    • Chloride 07/17/2024 106    • CO2 07/17/2024 13 (L)    • ANION GAP 07/17/2024 17 (H)    • BUN 07/17/2024 10    • Creatinine 07/17/2024 0.64    • Glucose 07/17/2024 112    • Calcium 07/17/2024 9.1    • AST 07/17/2024 52 (H)    • ALT 07/17/2024 85 (H)    • Alkaline Phosphatase 07/17/2024 71    • Total Protein 07/17/2024 7.9    • Albumin 07/17/2024 4.2    • Total Bilirubin 07/17/2024 0.57    • eGFR 07/17/2024 134    • Lipase 07/17/2024 11    • hs TnI 0hr 07/17/2024 5    • Sodium 07/17/2024 137    • Potassium 07/17/2024 3.8    • Chloride 07/17/2024 105    • CO2 07/17/2024 24    • ANION GAP 07/17/2024 8    • BUN 07/17/2024 9    • Creatinine 07/17/2024 0.61    • Glucose 07/17/2024 108    • Calcium 07/17/2024 8.6    • eGFR 07/17/2024 136          Radiology Results:   No results found.    John Spear MD  Fellow  Gastroenterology  [facility]

## 2024-09-04 NOTE — PATIENT INSTRUCTIONS
Patient  has labs that needs to be done  as well as stool studies .  Scheduled US and Elastography  for next week at [place] for the patient    Follow up will be three months  Recall is in

## 2024-09-05 LAB
ANA HOMOGEN SER QL IF: NORMAL
ANA HOMOGEN TITR SER: NORMAL {TITER}

## 2024-09-06 LAB
ACTIN IGG SERPL-ACNC: 5 UNITS (ref 0–19)
MITOCHONDRIA M2 IGG SER-ACNC: <20 UNITS (ref 0–20)

## 2024-09-10 ENCOUNTER — HOSPITAL ENCOUNTER (OUTPATIENT)
Dept: RADIOLOGY | Age: 29
Discharge: HOME | End: 2024-09-10
Payer: COMMERCIAL

## 2024-09-10 DIAGNOSIS — R79.89 ELEVATED LIVER FUNCTION TESTS: ICD-10-CM

## 2024-09-10 PROCEDURE — 76705 ECHO EXAM OF ABDOMEN: CPT

## 2024-09-10 PROCEDURE — 76981 USE PARENCHYMA: CPT

## 2024-09-10 NOTE — RESULT ENCOUNTER NOTE
Hi Mr. Hanna  I hope you are doing well after we met in the office last week.  I am reaching out to let you know that all the blood work that we had obtained on 9//24 in order to evaluate cause for elevated liver enzymes have remained essentially unremarkable.  Only one of the marker was positive which was RADHA which is very nonspecific. I know that you had ultrasound tests done earlier today.  I am still waiting on the results of the same.  Once have the results of that I will reach out to you separately.  Please let me know how you are doing otherwise.    Sent MCM as above.

## 2024-09-11 LAB
A1AT PHENOTYP SERPL IFE: NORMAL
A1AT SERPL-MCNC: 152 MG/DL (ref 95–164)

## 2024-09-12 ENCOUNTER — TELEPHONE (OUTPATIENT)
Age: 29
End: 2024-09-12

## 2024-09-12 DIAGNOSIS — R79.89 ELEVATED LIVER FUNCTION TESTS: ICD-10-CM

## 2024-09-12 DIAGNOSIS — R19.7 VOMITING AND DIARRHEA: Primary | ICD-10-CM

## 2024-09-12 DIAGNOSIS — R11.10 VOMITING AND DIARRHEA: Primary | ICD-10-CM

## 2024-09-12 DIAGNOSIS — K76.0 FATTY LIVER: ICD-10-CM

## 2024-09-12 NOTE — RESULT ENCOUNTER NOTE
I called and spoke to him about the results of blood workWhich showed essentially unremarkable results except for mildly elevated RADHA titer of 1/40.  Also discussed the results of ultrasound as well as US elastography which showed steatosis without any significant fibrosis. Discussed to continue ongoing efforts at weight loss. Overally symptomaticcaly feeling well.  Discussed that as long as he continues to do well we can move his appointment from 3 to 6 months, would recommend to obtain CMP prior to next follow-up visit.  I will add order today.

## 2024-10-18 ENCOUNTER — AMB VIDEO VISIT (OUTPATIENT)
Dept: OTHER | Facility: HOSPITAL | Age: 29
End: 2024-10-18
Payer: COMMERCIAL

## 2024-10-18 PROCEDURE — 99212 OFFICE O/P EST SF 10 MIN: CPT | Performed by: EMERGENCY MEDICINE

## 2024-10-18 NOTE — CARE ANYWHERE EVISITS
Visit Summary for CALEB MEDEROS - Gender: Male - Date of Birth: 1995  Date: [id number] - Duration: 12 minutes  Patient: CALEB MEDEROS  Provider: Lenny Camarena    Patient Contact Information  Address  [address] [address]  [phone]    Visit Topics  Headaches diarrhea  [Added By: Self - 2024-10-18]    Sick Slip  Reason [ILLNESS]. Remarks [SEEN BY TELEMED 10/18/24.   HOME UNTIL WELL.].    Triage Questions   What is your current physical address in the event of a medical emergency? Answer []  Are you allergic to any medications? Answer []  Are you now or could you be pregnant? Answer []  Do you have any immune system compromise or chronic lung   disease? Answer []  Do you have any vulnerable family members in the home (infant, pregnant, cancer, elderly)? Answer []     Conversation Transcripts  [0A][0A] [Notification] You are connected with Lenny Camarena, Family Physician.[0A][Notification] CALEB MEDEROS is located in [place].[0A][Notification] CALEB MEDEROS has shared health history...[0A][Notification] Lenny Camarena has   issued a sick slip.[0A]    Diagnosis  Other viral enteritis  Encounter for issue of other medical certificate    Procedures  Value: [id number] Code: CPT-4 UNLISTED E&M SERVICE    Medications Prescribed    No prescriptions ordered    Provider Notes  [0A][0A] Communication: v[0A]TOPICS: Headaches   diarrhea.    NOTE[0A]History: FEELING ILL 1-2D.  The patient has had  nausea NO vomiting   Stools are described as DIARRHEA yest. OK now.The patient has been able to eat/drink food/ fluids.No persistent/   localized abdominal pain. COVID neg yest x2 PMH:  DM2??   GI probs?PSH:  [0A]Medications:   metformin sometimes??   stopped semaglutide 2 mo ago- GI intol.[0A]Allergies: NKDA. [0A]Exam:[0A]Vitals signs (if available):[0A]General:  non toxic   NAD[0A]Respiratory: unlabored[0A]Abd:  NT[0A]Assessment: Gastroenteritis,   NOTE[0A]Diagnosis code: A08.39 Other  viral enteritisPlan:  NOTE. see below.   local f/u if not better.Medications  Home care:Increase fluids, frequent small sips of a liquid like   GatoradeAdvance diet as tolerated to bland solids, then normal dietReferral or follow up:In person follow up as needed for any worsening or if no improvement in 2-3 daysMedical decision making:[0A]Additional recommendations:If you received a   prescription at this visit and you have a question or problem please call [phone] for prescription assistancePlease print a copy of this note and send it to your regular doctor, or take it to your next visit so it may be included in your medical   recordPlease see your primary care provider on an annual basis or more frequently if directedThe patient voiced understanding and agreement with plan.[0A][0A]    Electronically signed by: Lenny Camarena(NPI [id number])

## 2024-11-19 NOTE — ASSESSMENT & PLAN NOTE
Patient requesting refills of blood glucose monitoring supplies.   - Refills sent to pharmacy   4 = No assist / stand by assistance

## 2024-11-20 ENCOUNTER — OFFICE VISIT (OUTPATIENT)
Age: 29
End: 2024-11-20
Payer: COMMERCIAL

## 2024-11-20 VITALS
OXYGEN SATURATION: 96 % | HEART RATE: 99 BPM | SYSTOLIC BLOOD PRESSURE: 120 MMHG | DIASTOLIC BLOOD PRESSURE: 72 MMHG | WEIGHT: 315 LBS | HEIGHT: 73 IN | BODY MASS INDEX: 41.75 KG/M2

## 2024-11-20 DIAGNOSIS — M99.02 SEGMENTAL DYSFUNCTION OF THORACIC REGION: ICD-10-CM

## 2024-11-20 DIAGNOSIS — M79.18 MYOFASCIAL PAIN ON RIGHT SIDE: ICD-10-CM

## 2024-11-20 DIAGNOSIS — M99.03 SEGMENTAL DYSFUNCTION OF LUMBAR REGION: ICD-10-CM

## 2024-11-20 DIAGNOSIS — M53.3 SACROILIAC JOINT DYSFUNCTION: ICD-10-CM

## 2024-11-20 DIAGNOSIS — M25.562 CHRONIC PAIN OF LEFT KNEE: ICD-10-CM

## 2024-11-20 DIAGNOSIS — M51.26 LUMBAR DISC HERNIATION: Primary | ICD-10-CM

## 2024-11-20 DIAGNOSIS — G89.29 CHRONIC PAIN OF LEFT KNEE: ICD-10-CM

## 2024-11-20 PROCEDURE — 99203 OFFICE O/P NEW LOW 30 MIN: CPT | Performed by: CHIROPRACTOR

## 2024-11-20 PROCEDURE — 98941 CHIROPRACT MANJ 3-4 REGIONS: CPT | Performed by: CHIROPRACTOR

## 2024-11-20 NOTE — PROGRESS NOTES
1. Lumbar disc herniation        2. Sacroiliac joint dysfunction        3. Segmental dysfunction of lumbar region        4. Segmental dysfunction of thoracic region        5. Myofascial pain on right side        6. Chronic pain of left knee  Ambulatory Referral to Orthopedic Surgery        Assessment/Plan:    Review of Diagnostic Studies and Office Notes:    The patient's September 16, 2023 MRI report of the lumbar spine, May 29, 2023 MRI report of the cervical spine, September 4, 2024 gastroenterology office note (fatty liver, vomiting and diarrhea, elevated liver function tests), July 17, 2024 emergency department note (vomiting and diarrhea, dehydration), May 30, 2024 pain medicine office note (acute pain of left knee, left knee pain developed after he was engaged in his morning yoga routine, felt a popping sensation developed pain in the left knee immediately), March 12, 2024 urology office note (testicular calcification, encounter for sterilization), May 10, 2023 physical therapy note (cervical radiculopathy, shoulder instability on the right, impingement syndrome of right shoulder) were reviewed prior to the chiropractic evaluation today.    Narrative & Impression   MRI LUMBAR SPINE WITHOUT CONTRAST     INDICATION: M54.41: Lumbago with sciatica, right side  G89.29: Other chronic pain.     COMPARISON: MRI lumbar spine dated 1/23/2019, CT lumbar spine dated 9/7/2022     TECHNIQUE:  Multiplanar, multisequence imaging of the lumbar spine was performed. .        IMAGE QUALITY:  Diagnostic     FINDINGS:     VERTEBRAL BODIES:  There are 5 lumbar type vertebral bodies.  Normal alignment of the lumbar spine.  No spondylolysis or spondylolisthesis. No scoliosis.  No compression fracture.    Normal marrow signal is identified within the visualized bony   structures.  No discrete marrow lesion.     SACRUM:  Normal signal within the sacrum. No evidence of insufficiency or stress fracture.     DISTAL CORD AND CONUS:   "Normal size and signal within the distal cord and conus. The conus terminates at T12.     PARASPINAL SOFT TISSUES:  Paraspinal soft tissues are unremarkable.     LOWER THORACIC DISC SPACES:  Normal disc height and signal.  No disc herniation, canal stenosis or foraminal narrowing.     LUMBAR DISC SPACES: Loss of normal disc height and signal at L4-L5 and L5-S1.     L1-L2:  Normal.     L2-L3:  Normal.     L3-L4:  Normal.     L4-L5: Broad disc bulge with a superimposed right foraminal disc extrusion with a persistent annular fissure, causing severe right neural foraminal stenosis and suspected impingement upon the right exiting L4 nerve root, suspected to be mildly progressed   from prior. No significant central canal stenosis.     L5-S1: Large disc bulge with a superimposed large right foraminal disc extrusion which contains a large annular fissure, causing severe right foraminal stenosis and impingement on the right exiting L5 nerve root, new from prior.     IMPRESSION:     New significant findings at the right L5-S1 neural foramen with severe right foraminal stenosis at this level and impingement upon the right exiting L5 nerve root.     Mildly progressed findings at L4-L5 as detailed above.     This study was marked for \"Significant\" notification in EPIC.           Workstation performed: [id number]     Medical Decision Making and Treatment Plan:    The patient has a long history of right sided disc extrusion at the L4-5 level and L5-S1 level.  The patient has symptoms along the course of the right L5 nerve root as he indicates experiencing pain into the right superior lateral gluteal region/lateral hip.  However he did not have positive nerve root tension findings on orthopedic testing today.  Nonetheless the L4-5 and L5-S1 disc herniations will be taken into account during chiropractic treatment.  The patient has been unable to do yoga as he recently injured his left knee while doing yoga.  The injury to the left " knee may also be altering his gait causing abnormal stress in the right thoracolumbar/lumbar and pelvic region resulting in an exacerbation of symptoms.  Therefore the patient was referred for an orthopedic evaluation of the left knee.  The patient had an MRI of the left knee but did not undergo treatment. The patient has myofascial findings as well as lower thoracic facet restrictions, lumbar facet restrictions and a right-sided sacroiliac joint restriction which can contribute to limited range of motion and pain.    The patient has a significant leg length discrepancy, possible anatomical that may need require a CT scanogram to determine the leg length discrepancy.  Depending on the results the patient may be referred for a podiatric evaluation and possible heel lift.    The patient was referred to Mario Alberto Enriquez MD (orthopedic) for evaluation of his left knee.  The patient will be treated with conservative chiropractic care including myofascial release, joint mobilization, and a home stretching and icing program.    The patient was taught an assisted gluteus medius/piriformis stretch today. The patient was advised to do the stretch for 3 sets of 15-20 seconds several times per day.The need to stretch to the point of tension only was discussed. ROM was measured with an TeeBeeDee digital dual inclinometer.    The patient will initially be seen 2 times per week and the frequency of treatment will be reduced to 1 time per week as there are decreased symptoms and improvement in objective findings. The patient will then be discharged.    Patient Instructions:    The patient was advised to avoid sleeping in the prone position. Proper sleeping postures and use of pillows were discussed.  Avoid toe touch stretches.  The patient was advised to apply ice to the region in 15-minute intervals a minimum of 3 times per day.   The patient was informed that he may experience additional soreness following the today's treatment  "visit. The need to continue with the stretching exercises and apply ice in 15-minute intervals was discussed with the patient.   The patient was informed they may see redness or possible bruising in the region of Graston technique treatment.     Goals:    Improve patient's ability to perform hip flexion type movements including walking up steps.    TIME/Reviewed with Patient:  At least 30 minutes of time was spent with the patient during the consultation including the patient's history/current complaints, physical exam, reviewing the diagnostic report/office notes, reviewing home instruction/reducing risk factors with the patient, reviewing my findings with the patient, and discussing the treatment/treatment plan with the patient.    This is a separate identifiable portion of today's visit from the E and M code billed.    Treatment today consisted of Myofascial release via Graston Technique was performed to the right lumbar erector spinae, right quadratus lumborum, gluteus medius, right anterior/lateral gluteus medius, and right tensor fascia dre (superficial). GT 3 and GT 4 were used.     Manipulation was performed to the right sacroiliac joint via Zavala low force drop technique. Contact was made to the right ischial tuberosity and the left lateral aspect of the  apex of the sacrum. Manipulation was performed to the lumbar restrictions via manual lumbar flexion distraction technique. Manipulation was performed to the lower thoracic restrictions via grade 1 mobilization techniques in the prone position.      Subjective:      Hugo Hanna is a 29 y.o. male who presents today with pain in the right thoracolumbar/lumbar region, right sacroiliac region, right superior gluteal region, and right superior lateral gluteal region.  The patient stated he initially injured his back in 2018 while at work.  He stated he did a quick \"jerking motion\" as he reflexively tried to catch an empty cardboard box towards his " "right side.  He stated he had physical therapy but no progress at that time.  He reported that he was evaluated at the [facility] and was referred for an MRI of the lumbar spine.  However he reported that he did not have any treatment.  The patient then had an exacerbation in September 2022 when he presented at the emergency department.  He stated he was given an epidural steroid injection in the hospital.  The patient reported that he had difficulty lifting his right leg for 3 to 4 months and was scheduled for lumbar fusion surgery.  However he was reluctant to have the surgery because of his age.  Therefore he underwent chiropractic treatment which helped.  In review of the patient's record he was under the care of Erik Dallas DC (chiropractic) at [facility].  The patient stated that he has been doing yoga to maintain his flexibility and avoid exacerbations of right-sided back and superior gluteal region pain.  However he injured his left knee (\"felt a pop\") while doing yoga and therefore has been unable to continue to do yoga.  He feels this has decreased his flexibility and cause aggravation of his right back/pelvic pain.  The patient confirmed that he had a recent left knee injury which makes it difficult to walk up steps due to the pain in the left anterior knee.  The patient confirmed that he had an evaluation of his left knee followed by an MRI of the left knee which did not reveal any particular source of left knee pain.  Therefore he did not have any treatment but he continues to experience pain in the left anterior knee with walking up steps.  Currently the patient has difficulty lifting his right lower extremity (right hip flexion type movements) due to the pain in his right sacroiliac and superior/superior lateral gluteal region.  He stated he feels \"grinding\" in his right gluteal region when performing this motion.  He also describes experiencing a \"pop\" in his right hip " "region when he raises his right lower extremity while lying in the supine position.  The patient reported that he had physical therapy in the past for similar right sacroiliac/superior gluteal and superior lateral gluteal symptoms and had significant relief with pulling on the right lower extremity to adjust his pelvis.  The patient is aware that he has a significant leg length discrepancy with the right lower extremity being more short when compared to the left.  The patient stated that when he was under the care of Dr. Dallas he was provided a heel lift but it was eventually removed after he had made progress.  The patient no longer uses a heel lift in his shoe.  The patient stated that he is less aware of the pain in the right thoracolumbar and lumbar region since he has been feeling most of the pain in the right sacroiliac/superior gluteal and superior lateral gluteal regions.  However he stated the right thoracolumbar and right lumbar symptoms have been present since 2018.  The patient describes his right sacroiliac and right superior/superior lateral gluteal pain has an 8 on a 0-10 pain scale to my assistant today.  On his intake paperwork he described his pain level as a 6 on a 0-10 pain scale today.  He describes his pain level as a 9 on a 0-10 pain scale at its worst.  The patient described his left knee pain as a 9 on a 0-10 pain scale today.    Objective:    Vitals:    11/20/24 1520   BP: 120/72   Pulse: 99   SpO2: 96%   Weight: (!) 186 kg (409 lb)   Height: 6' 1\" (1.854 m)     Appearance: Well developed, well nourished, and in no acute distress    Alert and oriented x 3    Mood/Affect: calm and pleasant    Gait/Posture:    Gait: Normal    Antalgic posture: None    Lordosis: Lumbar- normal    Kyphosis: Thoracic- normal    Leg length:  A significant leg length discrepancy was noted with the right lower extremity being more short when compared to the left.    Lower extremity reflexes:    Deep tendon " reflexes were normal and symmetrical in the bilateral lower extremities.    Lower Extremity Muscle Testing:    Muscle testing of the bilateral lower extremities was normal and graded +5/5.    Sensory:    Sensation to light touch was normal and symmetrical in the bilateral lower extremities.    Babinski was negative bilaterally.    Lumbar Orthopedic Tests:    Seated Straight Leg Raise was positive on the Right for right lateral gluteal discomfort.    Seated Straight Leg Raise was negative  on the Left.    Supine Straight Leg Raise was negative  on the Right.    Supine Straight Leg Raise was negative  on the Left. Mild to moderate hamstring inflexibility was noted on the right. Mild hamstring inflexibility was noted on the left.    Bragard's Test was negative  on right    Markus Test was positive bilaterally.    Active Lumbar Ranges of Motion:    Flexion: Allow the patient to touch the knee    Extension: 27 degrees    Right lateral flexion: 24 degrees    Left Lateral flexion: 22 degrees    Palpation:    Negative Derefield on the right. Active myofascial trigger points were present in the right thoracolumbar/lumbar erector spinae, quadratus lumborum, gluteus medius, right anterior/lateral gluteus medius, and TFL. Pressure to the above listed trigger points reproduced some of the pain described in today's chief complaint. Intersegmental motion was decreased in the thoracic spine including joint dysfunctions at T10-11 and T11-12. Intersegmental motion was decreased in the lumbar spine including joint dysfunctions at L4-5 and L5-S1. Intersegmental motion was decreased in the right sacroiliac joint.         Dictation voice to text software has been used in the creation of this document. Please consider this in light of any contextual or grammatical errors.

## 2024-11-20 NOTE — PATIENT INSTRUCTIONS
The patient was advised to avoid sleeping in the prone position. Proper sleeping postures and use of pillows were discussed.  Avoid toe touch stretches.  The patient was advised to apply ice to the region in 15-minute intervals a minimum of 3 times per day.   The patient was informed that he may experience additional soreness following the today's treatment visit. The need to continue with the stretching exercises and apply ice in 15-minute intervals was discussed with the patient.   The patient was informed they may see redness or possible bruising in the region of Graston technique treatment.

## 2024-11-21 PROBLEM — M99.03 SEGMENTAL DYSFUNCTION OF LUMBAR REGION: Status: ACTIVE | Noted: 2024-11-21

## 2024-11-21 PROBLEM — M79.18 MYOFASCIAL PAIN ON RIGHT SIDE: Status: ACTIVE | Noted: 2024-11-21

## 2024-11-21 PROBLEM — M51.26 LUMBAR DISC HERNIATION: Status: ACTIVE | Noted: 2024-11-21

## 2024-11-21 PROBLEM — M25.562 CHRONIC PAIN OF LEFT KNEE: Status: ACTIVE | Noted: 2024-11-21

## 2024-11-21 PROBLEM — G89.29 CHRONIC PAIN OF LEFT KNEE: Status: ACTIVE | Noted: 2024-11-21

## 2024-11-21 PROBLEM — M53.3 SACROILIAC JOINT DYSFUNCTION: Status: ACTIVE | Noted: 2024-11-21

## 2024-11-21 PROBLEM — M99.02 SEGMENTAL DYSFUNCTION OF THORACIC REGION: Status: ACTIVE | Noted: 2024-11-21

## 2024-11-22 ENCOUNTER — PROCEDURE VISIT (OUTPATIENT)
Age: 29
End: 2024-11-22
Payer: COMMERCIAL

## 2024-11-22 VITALS
HEIGHT: 73 IN | WEIGHT: 315 LBS | DIASTOLIC BLOOD PRESSURE: 70 MMHG | BODY MASS INDEX: 41.75 KG/M2 | HEART RATE: 94 BPM | OXYGEN SATURATION: 96 % | SYSTOLIC BLOOD PRESSURE: 122 MMHG

## 2024-11-22 DIAGNOSIS — M79.18 MYOFASCIAL PAIN ON RIGHT SIDE: ICD-10-CM

## 2024-11-22 DIAGNOSIS — M51.26 LUMBAR DISC HERNIATION: Primary | ICD-10-CM

## 2024-11-22 DIAGNOSIS — M99.03 SEGMENTAL DYSFUNCTION OF LUMBAR REGION: ICD-10-CM

## 2024-11-22 DIAGNOSIS — M53.3 SACROILIAC JOINT DYSFUNCTION: ICD-10-CM

## 2024-11-22 DIAGNOSIS — M99.02 SEGMENTAL DYSFUNCTION OF THORACIC REGION: ICD-10-CM

## 2024-11-22 PROCEDURE — 98941 CHIROPRACT MANJ 3-4 REGIONS: CPT | Performed by: CHIROPRACTOR

## 2024-11-22 PROCEDURE — 97110 THERAPEUTIC EXERCISES: CPT | Performed by: CHIROPRACTOR

## 2024-11-22 NOTE — PROGRESS NOTES
Assessment:  The patient is progressing as expected.  The patient typically responds to yoga but has been unable to do yoga because of his left knee condition.  Therefore there was a focus on flexibility of the gluteal musculature and hamstrings.  Improved left hamstring flexibility will also help his left knee condition.    1. Lumbar disc herniation        2. Sacroiliac joint dysfunction        3. Segmental dysfunction of lumbar region        4. Segmental dysfunction of thoracic region        5. Myofascial pain on right side          Plan:  Treatment today consisted of myofascial release via ischemic compression to the right lumbar erector spinae, right quadratus lumborum, and right gluteus medius myofascial release via ischemic compression was performed to the right anterior/lateral gluteus medius and right tensor fascia dre as the patient performed active hip flexion exercises.    Manipulation was performed to the right sacroiliac joint via Zavala low force drop technique. Contact was made to the right ischial tuberosity and the left lateral aspect of the  apex of the sacrum. Manipulation was performed to the lumbar restrictions via manual lumbar flexion distraction technique. Manipulation was performed to the lower thoracic restrictions via grade 1 mobilization techniques in the prone position.    Therapeutic exercises were performed with the goal of improving flexibility and function. The therapeutic exercises were performed for at least 8 minutes as I performed PNF stretching to the bilateral gluteus medius and piriformis and bilateral hamstrings.    Subjective:  The patient reported feeling the same as his initial chiropractic evaluation.  He denied experience additional soreness/pain associated with the initial chiropractic treatment visit/Graston Technique.  He indicated having pain in the right thoracolumbar/lumbar region, right sacroiliac region, right superior gluteal region, and right superior  lateral gluteal region. The patient has difficulty lifting his right lower extremity (right hip flexion type movements) due to the pain in his right sacroiliac and superior/superior lateral gluteal region.  He has pain in the right thoracolumbar region, lumbar region, right sacroiliac/superior gluteal region, and superior lateral gluteal region. The patient describes his right sacroiliac and right superior/superior lateral gluteal pain has a 6 on a 0-10 pain scale to my assistant today.  He described his back pain level as a 6 on a 0-10 pain scale today.  The patient stated that he has an orthopedic appointment for evaluation of the left knee on Tuesday as I suggested.    Objective:  Negative Derefield on the right. Active myofascial trigger points were present in the right thoracolumbar/lumbar erector spinae, quadratus lumborum, gluteus medius, right anterior/lateral gluteus medius, and TFL.  The right hamstring is more limited in flexibility when compared to the left.  Intersegmental motion was decreased in the thoracic spine including joint dysfunctions at T10-11 and T11-12. Intersegmental motion was decreased in the lumbar spine including joint dysfunctions at L4-5 and L5-S1. Intersegmental motion was decreased in the right sacroiliac joint.       I have used the Epic copy/forward function to compose this note.  I have reviewed my current note to ensure it reflects the current patient status, exam, assessment and plan.    Dictation voice to text software has been used in the creation of this document. Please consider this in light of any contextual or grammatical errors.

## 2024-11-25 ENCOUNTER — PROCEDURE VISIT (OUTPATIENT)
Age: 29
End: 2024-11-25
Payer: COMMERCIAL

## 2024-11-25 VITALS
WEIGHT: 315 LBS | SYSTOLIC BLOOD PRESSURE: 128 MMHG | BODY MASS INDEX: 41.75 KG/M2 | HEIGHT: 73 IN | DIASTOLIC BLOOD PRESSURE: 78 MMHG

## 2024-11-25 DIAGNOSIS — M53.3 SACROILIAC JOINT DYSFUNCTION: ICD-10-CM

## 2024-11-25 DIAGNOSIS — M51.26 LUMBAR DISC HERNIATION: Primary | ICD-10-CM

## 2024-11-25 DIAGNOSIS — M79.18 MYOFASCIAL PAIN ON RIGHT SIDE: ICD-10-CM

## 2024-11-25 DIAGNOSIS — M99.03 SEGMENTAL DYSFUNCTION OF LUMBAR REGION: ICD-10-CM

## 2024-11-25 DIAGNOSIS — M99.02 SEGMENTAL DYSFUNCTION OF THORACIC REGION: ICD-10-CM

## 2024-11-25 PROCEDURE — 98941 CHIROPRACT MANJ 3-4 REGIONS: CPT | Performed by: CHIROPRACTOR

## 2024-11-25 PROCEDURE — 97110 THERAPEUTIC EXERCISES: CPT | Performed by: CHIROPRACTOR

## 2024-11-25 NOTE — PROGRESS NOTES
"Assessment:  The patient is progressing as expected.    1. Lumbar disc herniation        2. Sacroiliac joint dysfunction        3. Segmental dysfunction of lumbar region        4. Segmental dysfunction of thoracic region        5. Myofascial pain on right side          Plan:  Treatment today consisted of myofascial release via Graston Technique to the right lumbar erector spinae, right quadratus lumborum, gluteus medius, right anterior/lateral gluteus medius, and right tensor fascia dre. GT 3 and GT 4 were used.     Manipulation was performed to the right sacroiliac joint via Zavala low force drop technique. Contact was made to the right ischial tuberosity and the left lateral aspect of the  apex of the sacrum. Manipulation was performed to the lumbar restrictions via manual lumbar flexion distraction technique. Manipulation was performed to the lower thoracic restrictions via grade 1 mobilization techniques in the prone position.    Therapeutic exercises were performed with the goal of improving flexibility and function. The therapeutic exercises were performed for at least 8 minutes as I performed PNF stretching to the bilateral gluteus medius and piriformis and bilateral hamstrings.    Subjective:  The patient reported feeling better when compared to the last chiropractic visit.  However he stated that he had an instance when he was sitting and got up quickly from a seated position and felt a \"pop\" in his right hip region.  He stated that he did not experience significant amount of pain or have pain afterwards but feels a general soreness in the region but mainly feels soreness in his lower back region.  The patient stated that he is not experiencing the sharp pain in his right hip or lower back region and therefore feels that the intensity of the pain is less overall.  He indicated having soreness in the right thoracolumbar/lumbar region, right sacroiliac region, right superior gluteal region, and right " superior lateral gluteal region. The patient reported that he is able to do right hip flexion type movements better and without significant pain. He described his back pain level as a 5 on a 0-10 pain scale today.    Objective:  Negative Derefield on the right. Active myofascial trigger points were present in the right thoracolumbar/lumbar erector spinae, quadratus lumborum, gluteus medius, right anterior/lateral gluteus medius, and TFL.  The right hamstring is more limited in flexibility when compared to the left.  Intersegmental motion was decreased in the thoracic spine including a joint dysfunction at T11-12. Intersegmental motion was decreased in the lumbar spine including joint dysfunctions at L2-3 and L5-S1. Intersegmental motion was decreased in the right sacroiliac joint.  Right gluteal and right hamstring flexibility was better when compared to last chiropractic visit.       I have used the Epic copy/forward function to compose this note.  I have reviewed my current note to ensure it reflects the current patient status, exam, assessment and plan.    Dictation voice to text software has been used in the creation of this document. Please consider this in light of any contextual or grammatical errors.

## 2024-11-26 ENCOUNTER — OFFICE VISIT (OUTPATIENT)
Dept: OBGYN CLINIC | Facility: CLINIC | Age: 29
End: 2024-11-26
Payer: COMMERCIAL

## 2024-11-26 VITALS
HEART RATE: 89 BPM | BODY MASS INDEX: 41.75 KG/M2 | DIASTOLIC BLOOD PRESSURE: 90 MMHG | SYSTOLIC BLOOD PRESSURE: 143 MMHG | WEIGHT: 315 LBS | HEIGHT: 73 IN

## 2024-11-26 DIAGNOSIS — G89.29 CHRONIC PAIN OF LEFT KNEE: ICD-10-CM

## 2024-11-26 DIAGNOSIS — M76.52 PATELLAR TENDONITIS OF LEFT KNEE: Primary | ICD-10-CM

## 2024-11-26 DIAGNOSIS — M25.562 CHRONIC PAIN OF LEFT KNEE: ICD-10-CM

## 2024-11-26 PROCEDURE — 99214 OFFICE O/P EST MOD 30 MIN: CPT | Performed by: ORTHOPAEDIC SURGERY

## 2024-11-26 RX ORDER — MELOXICAM 15 MG/1
15 TABLET ORAL DAILY
Qty: 30 TABLET | Refills: 1 | Status: SHIPPED | OUTPATIENT
Start: 2024-11-26

## 2024-11-26 NOTE — PROGRESS NOTES
CHIEF COMPLAINT/REASON FOR VISIT  Chief Complaint   Patient presents with    Left Knee - Pain        HISTORY OF PRESENT ILLNESS  Hugo Hanna is a 29 y.o. male who presents for evaluation of his left knee.  Patient said for the past couple of months he has been doing with anterior left knee pain.  He denies any obvious injury, strenuous activity, or trauma preceding his symptoms.  Patient reports occasional clicking of the joint.  He said the pain worsens with bending and stairs.  He feels the pain waxes and wanes over time.  Patient denies pain at rest, catching, instability, previous surgeries to the left knee, and previous injections of the left knee.    REVIEW OF SYSTEMS  Review of systems was performed and, outside that mentioned in the HPI, it was negative for symptomology related to the integumentary, hematologic, immunologic, allergic, neurologic, cardiovascular, respiratory, GI or  systems.    MEDICAL HISTORY  Patient Active Problem List   Diagnosis    Testicle pain    Acute right lumbar radiculopathy    Hand pain, right    Acute pain of right wrist    Chronic seasonal allergic rhinitis    Class 3 severe obesity due to excess calories with serious comorbidity and body mass index (BMI) of 50.0 to 59.9 in adult (HCC)    Chronic right shoulder pain    Painful skin lesion    Daytime somnolence    Shoulder instability, right    Numbness of right hand    Cervical radiculopathy    MYRIAM (obstructive sleep apnea)    Epigastric pain    Generalized abdominal pain    Type 2 diabetes mellitus without complication, without long-term current use of insulin (HCC)    Pain of right hip    Strep throat    Acute pain of left knee    Encounter for sterilization    Lumbar disc herniation    Sacroiliac joint dysfunction    Segmental dysfunction of lumbar region    Segmental dysfunction of thoracic region    Myofascial pain on right side    Chronic pain of left knee       SURGICAL HISTORY  Past Surgical History:   Procedure  Laterality Date    FL INJECTION RIGHT SHOULDER (ARTHROGRAM)  02/06/2023    VASECTOMY  06/04/2024       CURRENT MEDICATIONS    Current Outpatient Medications:     Blood Glucose Monitoring Suppl (Contour Next Gen Monitor) DONNA, CHECK BLOOD SUGARS TWICE DAILY IN MORNING AND EVENING, Disp: , Rfl:     ALPRAZolam (XANAX) 1 mg tablet, Take 1 tablet (1 mg total) by mouth daily at bedtime as needed for anxiety Take 1 hour before appointment (Patient not taking: Reported on 11/20/2024), Disp: 1 tablet, Rfl: 0    dicyclomine (BENTYL) 20 mg tablet, Take 1 tablet (20 mg total) by mouth 2 (two) times a day for 7 days (Patient not taking: Reported on 11/26/2024), Disp: 14 tablet, Rfl: 0    SOCIAL HISTORY  Social History     Socioeconomic History    Marital status: /Civil Union     Spouse name: Not on file    Number of children: Not on file    Years of education: Not on file    Highest education level: Not on file   Occupational History    Not on file   Tobacco Use    Smoking status: Never    Smokeless tobacco: Never   Vaping Use    Vaping status: Never Used   Substance and Sexual Activity    Alcohol use: Yes     Comment: socially    Drug use: No    Sexual activity: Not on file   Other Topics Concern    Not on file   Social History Narrative    Not on file     Social Drivers of Health     Financial Resource Strain: Low Risk  (5/30/2024)    Overall Financial Resource Strain (CARDIA)     Difficulty of Paying Living Expenses: Not hard at all   Food Insecurity: No Food Insecurity (5/30/2024)    Nursing - Inadequate Food Risk Classification     Worried About Running Out of Food in the Last Year: Never true     Ran Out of Food in the Last Year: Never true     Ran Out of Food in the Last Year: Not on file   Transportation Needs: No Transportation Needs (5/30/2024)    PRAPARE - Transportation     Lack of Transportation (Medical): No     Lack of Transportation (Non-Medical): No   Physical Activity: Inactive (8/18/2023)    Exercise  "Vital Sign     Days of Exercise per Week: 0 days     Minutes of Exercise per Session: 0 min   Stress: No Stress Concern Present (5/30/2024)    [employer] - Occupational Stress Questionnaire     Feeling of Stress : Not at all   Social Connections: Not on file   Intimate Partner Violence: Not At Risk (5/30/2024)    Humiliation, Afraid, Rape, and Kick questionnaire     Fear of Current or Ex-Partner: No     Emotionally Abused: No     Physically Abused: No     Sexually Abused: No   Housing Stability: Low Risk  (5/30/2024)    Housing Stability Vital Sign     Unable to Pay for Housing in the Last Year: No     Number of Times Moved in the Last Year: 1     Homeless in the Last Year: No       Objective     VITAL SIGNS  /90   Pulse 89   Ht 6' 1\" (1.854 m)   Wt (!) 185 kg (408 lb)   BMI 53.83 kg/m²      PHYSICAL EXAMINATION    Musculoskeletal: Left Knee Examination:  General: The patient is alert, oriented, and pleasant to interact with.  Patient ambulates with Normal gait pattern  Assistive Device: No  Alignment: normal  Skin is warm and dry to touch with no signs of erythema, ecchymosis, or infection   Effusion: none  ROM: 0° - 135°    MMT: deferred  TTP: Patellar tendon  Flexor and extensor mechanisms are intact   Pain with resisted knee extension  Knee is stable to varus and valgus stress  Fanny's Test Negative    Lachman's Test - 1A  2+ DP and PT pulses with brisk capillary refill to the toes  Sural, saphenous, tibial, superficial, and deep peroneal motor and sensory distributions intact  Sensation light touch intact distally    RADIOGRAPHIC EXAMINATION/DIAGNOSTICS:  Independent interpretation of left knee MRI shows no obvious acute internal derangement    ASSESSMENT/PLAN:  Left knee pain; patellar tendonitis    We discussed the diagnosis above in length in terms the patient could understand  Educated patient on various conservative treatment options including but are certainly " not limited to: rest, ice, compression, elevation, activity modification, anti-inflammatory medication, physical therapy, and/or injections.  After discussion, patient was agreeable to trying Rest, Ice, Compression, Elevation, Activity Modification, Anti-inflammatory Medication, and Physical Therapy. Mobic and PT orders placed.  Follow up : as needed  They are understanding that if the pain should worsen or they develop new symptoms to please reach out to us sooner. Patient is understanding and agreeable to this plan.     Scribe Attestation      I,:  Eladio Anguiano PA-C am acting as a scribe while in the presence of the attending physician.:       I,:  Chandan Presley MD personally performed the services described in this documentation    as scribed in my presence.:

## 2024-12-05 ENCOUNTER — PROCEDURE VISIT (OUTPATIENT)
Age: 29
End: 2024-12-05
Payer: COMMERCIAL

## 2024-12-05 VITALS
HEIGHT: 73 IN | HEART RATE: 96 BPM | SYSTOLIC BLOOD PRESSURE: 118 MMHG | BODY MASS INDEX: 41.75 KG/M2 | OXYGEN SATURATION: 97 % | DIASTOLIC BLOOD PRESSURE: 72 MMHG | WEIGHT: 315 LBS

## 2024-12-05 DIAGNOSIS — M79.18 MYOFASCIAL PAIN ON RIGHT SIDE: ICD-10-CM

## 2024-12-05 DIAGNOSIS — M99.02 SEGMENTAL DYSFUNCTION OF THORACIC REGION: ICD-10-CM

## 2024-12-05 DIAGNOSIS — M53.3 SACROILIAC JOINT DYSFUNCTION: ICD-10-CM

## 2024-12-05 DIAGNOSIS — M51.26 LUMBAR DISC HERNIATION: Primary | ICD-10-CM

## 2024-12-05 DIAGNOSIS — M99.03 SEGMENTAL DYSFUNCTION OF LUMBAR REGION: ICD-10-CM

## 2024-12-05 PROCEDURE — 97110 THERAPEUTIC EXERCISES: CPT | Performed by: CHIROPRACTOR

## 2024-12-05 PROCEDURE — 98941 CHIROPRACT MANJ 3-4 REGIONS: CPT | Performed by: CHIROPRACTOR

## 2024-12-05 NOTE — PROGRESS NOTES
Assessment:  The patient is progressing as expected.    1. Lumbar disc herniation        2. Sacroiliac joint dysfunction        3. Segmental dysfunction of lumbar region        4. Segmental dysfunction of thoracic region        5. Myofascial pain on right side          Plan:  Treatment today consisted of myofascial release via Graston Technique to the right thoracolumbar/lumbar erector spinae, right quadratus lumborum, and superior portion of the right gluteus medius.  GT 3 and was used. Myofascial release via ischemic compression was performed to the right anterior/lateral gluteus medius and right tensor fascia dre as the patient performed active hip flexion exercises.    Manipulation was performed to the right sacroiliac joint via Zavala low force drop technique. Contact was made to the right ischial tuberosity and the left lateral aspect of the  apex of the sacrum. Manipulation was performed to the lumbar restrictions via manual lumbar flexion distraction technique. Manipulation was performed to the lower thoracic restrictions via grade 1 mobilization techniques in the prone position.    Therapeutic exercises were performed with the goal of improving flexibility and function. The therapeutic exercises were performed for at least 8 minutes as I performed PNF stretching to the right gluteus medius and piriformis and bilateral hamstrings.    Discussion:  PNF stretching was not performed to the left gluteus medius and piriformis because the patient did not have any left groin discomfort.  The patient attempted to avoid using the adductor michelle to assist in the stretch but was still aware of some discomfort in the left groin.    Subjective:  The patient reported feeling better when compared to the last chiropractic visit.  The patient said he continues to experience stiffness in the right thoracolumbar/lumbar region as he has a chronic history of tension and discomfort in that region.  However he stated that the  right superior lateral gluteal region is feeling much better.  He stated the majority of his pain is present in the left knee today.  He stated he had an evaluation of the left knee with the orthopedist and was referred to physical therapy.  He has a physical therapy evaluation scheduled next week. He indicated having soreness in the right thoracolumbar/lumbar region, right sacroiliac region, right superior gluteal region, and right superior lateral gluteal region. He described his right hip pain level as a 3 on a 0-10 pain scale today.    Objective:  Negative Derefield on the right. Active myofascial trigger points were present in the right thoracolumbar/lumbar erector spinae, quadratus lumborum, gluteus medius, right anterior/lateral gluteus medius, and TFL.  Once again the right hamstring is more limited in flexibility when compared to the left.  Intersegmental motion was decreased in the thoracic spine including joint dysfunctions at T10-11 and T11-12. Intersegmental motion was decreased in the lumbar spine including joint dysfunctions at L2-3 and L5-S1. Intersegmental motion was decreased in the right sacroiliac joint.         I have used the Epic copy/forward function to compose this note.  I have reviewed my current note to ensure it reflects the current patient status, exam, assessment and plan.    Dictation voice to text software has been used in the creation of this document. Please consider this in light of any contextual or grammatical errors.

## 2024-12-10 ENCOUNTER — EVALUATION (OUTPATIENT)
Dept: PHYSICAL THERAPY | Age: 29
End: 2024-12-10
Payer: COMMERCIAL

## 2024-12-10 DIAGNOSIS — M76.52 PATELLAR TENDINITIS OF LEFT KNEE: Primary | ICD-10-CM

## 2024-12-10 DIAGNOSIS — M76.52 PATELLAR TENDONITIS OF LEFT KNEE: ICD-10-CM

## 2024-12-10 PROCEDURE — 97110 THERAPEUTIC EXERCISES: CPT | Performed by: PHYSICAL THERAPIST

## 2024-12-10 PROCEDURE — 97162 PT EVAL MOD COMPLEX 30 MIN: CPT | Performed by: PHYSICAL THERAPIST

## 2024-12-10 NOTE — PROGRESS NOTES
PT Evaluation     Today's date: 12/10/2024  Patient name: Hugo Hanna  : 1995  MRN: [id number]  Referring provider: Eladio Anguiano PA*  Dx:   Encounter Diagnosis     ICD-10-CM    1. Patellar tendonitis of left knee  M76.52 Ambulatory Referral to Physical Therapy                     Assessment  Impairments: abnormal gait, abnormal muscle tone, abnormal or restricted ROM, impaired physical strength, lacks appropriate home exercise program and pain with function    Assessment details: Pt reports to PT with cc of L knee and R hip pain. Pt has history of cervical and lumbar pain. Pt has decreased LE ROM and strength that likely contributes to L knee pain. Pt also reports difficulty with urination, displays 3+ LE reflexes and reports decreased sensation in saddle region. Due to this, pt was advised to contact spine and pain as he was previously seen by these providers and I will reach out to physician to discuss concerns.     Goals  In 4 weeks pt will:  -Be independent with phase I of HEP  -Increase LE strength by 1/2 grade  -Increase LE ROM by 10 degrees    By discharge pt will:  -Be independent with Phase II of HEP  -Demonstrate full LE strength  -Demonstrate full LE ROM  -Report minimal difficulty with ADLs    Plan  Patient would benefit from: skilled physical therapy    Planned therapy interventions: abdominal trunk stabilization, joint mobilization, manual therapy, neuromuscular re-education, strengthening, stretching, therapeutic activities, therapeutic exercise, functional ROM exercises and home exercise program    Frequency: 1-2x week  Plan of Care beginning date: 12/10/2024  Plan of Care expiration date: 2025  Treatment plan discussed with: patient        Subjective    Objective     Concurrent Complaints  Positive for bladder dysfunction and saddle (S4) numbness. Negative for bowel dysfunction    Neurological Testing     Sensation     Lumbar   Left   Diminished: light touch    Right    Intact: light touch    Reflexes   Left   Patellar (L4): brisk (3+)  Achilles (S1): brisk (3+)    Right   Patellar (L4): normal (2+)  Achilles (S1): brisk (3+)    Passive Range of Motion   Left Knee   Prone flexion: 95 degrees     Right Knee   Prone flexion: 100 degrees   Left Ankle/Foot    Dorsiflexion (ke): 6 degrees     Right Ankle/Foot    Dorsiflexion (ke): 8 degrees     Strength/Myotome Testing     Left Knee   Flexion: 4  Extension: 4    Right Knee   Flexion: 4  Extension: 4             Precautions: N/A      Manuals                                                                 Neuro Re-Ed                                                                                                        Ther Ex             HEP-quad/calf/HS stretch 15'                                                                                                       Ther Activity                                       Gait Training                                       Modalities

## 2024-12-11 ENCOUNTER — PROCEDURE VISIT (OUTPATIENT)
Age: 29
End: 2024-12-11
Payer: COMMERCIAL

## 2024-12-11 VITALS
WEIGHT: 315 LBS | OXYGEN SATURATION: 98 % | HEIGHT: 73 IN | DIASTOLIC BLOOD PRESSURE: 72 MMHG | SYSTOLIC BLOOD PRESSURE: 130 MMHG | BODY MASS INDEX: 41.75 KG/M2 | HEART RATE: 70 BPM

## 2024-12-11 DIAGNOSIS — M99.02 SEGMENTAL DYSFUNCTION OF THORACIC REGION: ICD-10-CM

## 2024-12-11 DIAGNOSIS — M79.18 MYOFASCIAL PAIN ON RIGHT SIDE: ICD-10-CM

## 2024-12-11 DIAGNOSIS — M51.26 LUMBAR DISC HERNIATION: Primary | ICD-10-CM

## 2024-12-11 DIAGNOSIS — M99.03 SEGMENTAL DYSFUNCTION OF LUMBAR REGION: ICD-10-CM

## 2024-12-11 DIAGNOSIS — M53.3 SACROILIAC JOINT DYSFUNCTION: ICD-10-CM

## 2024-12-11 PROCEDURE — 98941 CHIROPRACT MANJ 3-4 REGIONS: CPT | Performed by: CHIROPRACTOR

## 2024-12-12 NOTE — PROGRESS NOTES
Assessment:  The patient is progressing as expected.  The patient is not describing incontinence but only increased urinary frequency.  He is also indicating that he is not fully emptying his bladder which can be the result of an enlarged prostate as well.  Nonetheless the patient was encouraged to speak to the spine physician in regards to the MRI of the lumbar spine results as he is describing some tingling in the saddle region but only when sitting on the toilet for extended period time.  Paresthesias in the lower extremities with extremity movement to it for the superior time is common and not a sign of cauda equina syndrome. No cord compression or pressure on the cauda equina was noted on the MRI report of the lumbar spine.    1. Lumbar disc herniation        2. Sacroiliac joint dysfunction        3. Segmental dysfunction of lumbar region        4. Segmental dysfunction of thoracic region        5. Myofascial pain on right side          Plan:  Treatment today consisted of myofascial release via Graston Technique to the right thoracolumbar/lumbar erector spinae, right quadratus lumborum, right gluteus medius,  right anterior/lateral gluteus medius and right tensor fascia dre.  GT 3 and GT 4 were used.    Manipulation was performed to the right sacroiliac joint via Zavala low force drop technique. Contact was made to the right ischial tuberosity and the left lateral aspect of the  apex of the sacrum. Manipulation was performed to the lumbar restrictions via manual lumbar flexion distraction technique. Manipulation was performed to the lower thoracic restrictions via grade 1 mobilization techniques in the prone position.    Subjective:  The patient reported feeling the same as the last chiropractic visit.  He indicated that he is still aware of some discomfort in the right superior lateral gluteal region as he continues to experience mostly knee pain.  The patient confirmed that he had an evaluation with the  physical therapist for his left knee.  The patient reported that he was encouraged by the physical therapist to follow-up with the spine physician as he described having increased urinary frequency and reported he is not fully emptying his bladder when urinating.  The patient was questioned by me if he is experiencing saddle anesthesia.  He stated that he will experience paresthesias in the bilateral lower extremities if he is sitting on the toilet for prolonged period of time.  When I explained that I would am more concerned with saddle anesthesia and indicated aware that would be he indicated that he may be aware of paresthesias in that region if he is sitting on the toilet for prolonged period of time.  The patient denied having incontinence.  The patient confirmed that he had informed me that he had no changes in bowel or bladder function on his initial chiropractic evaluation.  The patient is waiting for a response from the spine physician and stated that it can take up to 48 hours to receive a call.  The patient acknowledges that an enlarged prostate can cause similar symptoms.  The patient stated he has a family history of prostate cancer as his father had prostate cancer and was treated successfully with surgery.  The patient reported that he had a PSA test in the past which was negative but it was not recently performed.  The patient said he continues to experience stiffness in the right thoracolumbar/lumbar region as he has a chronic history of tension and discomfort in that region. He indicated having soreness in the right thoracolumbar/lumbar region, right sacroiliac region, right superior gluteal region, and right superior lateral gluteal region. He described his right hip pain level as a 5 or 6 on a 0-10 pain scale today.    Objective:  Negative Derefield on the right. Active myofascial trigger points were present in the right thoracolumbar/lumbar erector spinae, quadratus lumborum, gluteus medius,  right anterior/lateral gluteus medius, and TFL.  Intersegmental motion was decreased in the thoracic spine including a joint dysfunction at T11-12. Intersegmental motion was decreased in the lumbar spine including joint dysfunctions at L4-5 and L5-S1. Intersegmental motion was decreased in the right sacroiliac joint.         I have used the Epic copy/forward function to compose this note.  I have reviewed my current note to ensure it reflects the current patient status, exam, assessment and plan.    Dictation voice to text software has been used in the creation of this document. Please consider this in light of any contextual or grammatical errors.

## 2024-12-13 ENCOUNTER — TELEMEDICINE (OUTPATIENT)
Dept: OTHER | Facility: HOSPITAL | Age: 29
End: 2024-12-13
Payer: COMMERCIAL

## 2024-12-13 VITALS — TEMPERATURE: 99.6 F

## 2024-12-13 DIAGNOSIS — K52.9 GASTROENTERITIS: Primary | ICD-10-CM

## 2024-12-13 PROCEDURE — 99213 OFFICE O/P EST LOW 20 MIN: CPT | Performed by: NURSE PRACTITIONER

## 2024-12-13 NOTE — LETTER
December 13, 2024     Patient: Hugo Hanna   YOB: 1995   Date of Visit: 12/13/2024       To Whom it May Concern:    Hugo Hanna is under my professional care. He was seen virtually on 12/13/2024. He may return to work on 12/16/2024 .    If you have any questions or concerns, please don't hesitate to call.         Sincerely,          Your Video Visit Provider        CC: No Recipients

## 2024-12-14 NOTE — PATIENT INSTRUCTIONS
"This is most likely viral gastroenteritis.  You can use imodium to help with diarrhea. Rest and drink extra fluids.  Increase clear liquids such as sports drinks or Gatorade.  Advance to bland foods. Follow up with PCP if no improvement.  Go to ER with any worsening symptoms, abd pain or vomiting.     Patient Education     Viral gastroenteritis in adults   The Basics   Written by the doctors and editors at [facility]   What is viral gastroenteritis? -- Viral gastroenteritis is an infection that can cause diarrhea and vomiting. It happens when a person's stomach and intestines get infected with a virus (figure 1). One of the most common causes of gastroenteritis is norovirus. But other viruses can cause it, too.  People can get viral gastroenteritis if they:   Touch an infected person or a surface with the virus on it, and then don't wash their hands   Eat foods or drink liquids with the virus in them. If people with the virus don't wash their hands, they can spread it to food or liquids they touch.  What are the symptoms of viral gastroenteritis? -- The infection causes diarrhea and vomiting. People can have either diarrhea or vomiting, or both. These symptoms usually start suddenly, and can be severe.  Viral gastroenteritis can also cause:   Fever   Headache or muscle aches   Belly pain or cramping   Loss of appetite  If you have a lot of diarrhea and vomiting, your body can lose too much water. This is known as \"dehydration.\" Dehydration can make you feel thirsty, tired, dizzy, or even confused. It can also make your urine look dark yellow.  Severe dehydration can be life-threatening. Older people are more likely to get severe dehydration.  Will I need tests? -- Not usually. Your doctor or nurse should be able to tell if you have viral gastroenteritis by learning about your symptoms and doing an exam. But the doctor or nurse might do tests to check for dehydration or to see which virus is causing the infection. " "These tests can include:   Blood tests   Urine tests   Tests on a sample of bowel movement  Is there anything I can do on my own to feel better? -- Yes. You need to replace the body's fluids that are lost through vomiting and diarrhea:   Drink fluids when you are able. It might help to take small sips every 15 to 30 minutes. Try to drink more as you start to feel better.   When you have a lot of vomiting or diarrhea, your body loses both water and salt. Drinking fluids that contain some salt can help replace what your body has lost. Examples include \"oral rehydration solutions,\" sports drinks, and broth. If you drink a lot of plain water, make sure you are also eating. This will help your body keep the right salt and water balance.   Avoid drinks with a lot of sugar, like juice or soda. Avoid alcohol, too.   Eat when you are able. If you can keep food down, it's best to eat lean meats, fruits, vegetables, and whole-grain breads and cereals. Avoid eating foods with a lot of fat or sugar, which can make symptoms worse.  If you are an adult younger than 65 and you have a new bout of diarrhea, and no fever and no blood in your bowel movements, you can take medicine to stop diarrhea such as loperamide (brand name: Imodium) for 1 to 2 days. But if you are older than 65, have a fever, or have blood in your bowel movements, do not take these medicines without checking with your doctor.  If you have diabetes, you might need to check your blood sugar more often until you feel better. Ask your doctor or nurse about this.  Should I call the doctor or nurse? -- Call the doctor or nurse if you:   Have any symptoms of dehydration, like feeling very tired, thirsty, dizzy, or confused   Have diarrhea or vomiting that lasts longer than a few days   Vomit up blood, have bloody diarrhea, or have severe belly pain   Haven't been able to drink anything for many hours   Haven't needed to urinate in the past 6 to 8 hours (during the " "day)  How is viral gastroenteritis treated? -- Most people do not need any treatment, because their symptoms will get better on their own. But people with severe dehydration might need treatment in the hospital. This involves getting fluids through a thin tube that goes into a vein, called an \"IV.\"  Doctors do not treat viral gastroenteritis with antibiotics. That's because antibiotics treat infections that are caused by bacteria, not viruses.  Can viral gastroenteritis be prevented? -- Sometimes. To lower the chance of getting or spreading the infection, wash your hands well with soap and water:   After you use the bathroom   Before you eat   Before you prepare food  Also, if you are caring for a child in diapers, wash your hands well after changing diapers. Do not change diapers near where you cook or eat food.  All topics are updated as new evidence becomes available and our peer review process is complete.  This topic retrieved from [employer] on: Mar 06, 2024.  Topic 99854 Version 17.0  Release: 32.2.4 - C32.64  © 2024 UpToDate, Inc. and/or its affiliates. All rights reserved.  figure 1: Digestive system     This drawing shows the organs in the body that process food. Together, these organs are called the \"digestive system\" or \"digestive tract.\" As food travels through this system, the body absorbs nutrients and water.  Graphic 44923 Version 5.0  Consumer Information Use and Disclaimer   Disclaimer: This generalized information is a limited summary of diagnosis, treatment, and/or medication information. It is not meant to be comprehensive and should be used as a tool to help the user understand and/or assess potential diagnostic and treatment options. It does NOT include all information about conditions, treatments, medications, side effects, or risks that may apply to a specific patient. It is not intended to be medical advice or a substitute for the medical advice, diagnosis, or treatment of a health care " provider based on the health care provider's examination and assessment of a patient's specific and unique circumstances. Patients must speak with a health care provider for complete information about their health, medical questions, and treatment options, including any risks or benefits regarding use of medications. This information does not endorse any treatments or medications as safe, effective, or approved for treating a specific patient. UpToDate, Inc. and its affiliates disclaim any warranty or liability relating to this information or the use thereof.The use of this information is governed by the Terms of Use, available at https://www.CloudJay.com/en/know/clinical-effectiveness-terms. 2024© UpToDate, Inc. and its affiliates and/or licensors. All rights reserved.  Copyright   © 2024 UpToDate, Inc. and/or its affiliates. All rights reserved.

## 2024-12-14 NOTE — PROGRESS NOTES
Virtual Regular Visit  Name: Hugo Hanna      : 1995      MRN: [id number]  Encounter Provider: Your Video Visit Provider  Encounter Date: 2024   Encounter department: VIRTUAL CARE       Verification of patient location:  Patient is located at Home in the following state in which I hold an active license [place] :  Assessment & Plan        Encounter provider Your Video Visit Provider    The patient was identified by name and date of birth. Hugo Hanna was informed that this is a telemedicine visit and that the visit is being conducted through the Epic Embedded platform. He agrees to proceed..  My office door was closed. No one else was in the room.  He acknowledged consent and understanding of privacy and security of the video platform. The patient has agreed to participate and understands they can discontinue the visit at any time.    Patient is aware this is a billable service.     History was obtained from: History obtained from: patient  History of Present Illness     This is a 29 year old male here today for video visit.  He states he thinks he has stomach bug.  He states all family members have similar symptoms.  He has been having vomiting, diarrhea, and stomach cramps.  He had headache and fever yesterday.  He states the stomach pain cramping.  Pain is in the upper abd.  He did have some back pain yesterday.  Stool is watery.  The vomiting has stopped. He needs a note for work.       Review of Systems   Constitutional:  Positive for fatigue and fever. Negative for activity change, chills and diaphoresis.   Respiratory: Negative.     Gastrointestinal:  Positive for abdominal pain, diarrhea, nausea and vomiting.   Neurological: Negative.    Psychiatric/Behavioral: Negative.         Objective   Temp 99.6 °F (37.6 °C)     Physical Exam  Constitutional:       General: He is not in acute distress.     Appearance: Normal appearance. He is not ill-appearing or toxic-appearing.   HENT:       Head: Normocephalic and atraumatic.   Pulmonary:      Effort: Pulmonary effort is normal. No respiratory distress.   Abdominal:      Tenderness: There is abdominal tenderness (generalized).   Neurological:      Mental Status: He is alert and oriented to person, place, and time.   Psychiatric:         Mood and Affect: Mood normal.         Behavior: Behavior normal.         Thought Content: Thought content normal.         Judgment: Judgment normal.         Visit Time  Total Visit Duration: 5 minutes not including the time spent for establishing the audio/video connection.

## 2024-12-15 ENCOUNTER — OFFICE VISIT (OUTPATIENT)
Dept: URGENT CARE | Age: 29
End: 2024-12-15
Payer: COMMERCIAL

## 2024-12-15 VITALS
HEART RATE: 84 BPM | SYSTOLIC BLOOD PRESSURE: 124 MMHG | RESPIRATION RATE: 16 BRPM | OXYGEN SATURATION: 97 % | TEMPERATURE: 96.4 F | DIASTOLIC BLOOD PRESSURE: 80 MMHG

## 2024-12-15 DIAGNOSIS — R19.7 DIARRHEA OF PRESUMED INFECTIOUS ORIGIN: Primary | ICD-10-CM

## 2024-12-15 DIAGNOSIS — R11.0 NAUSEA: ICD-10-CM

## 2024-12-15 PROCEDURE — 99213 OFFICE O/P EST LOW 20 MIN: CPT | Performed by: STUDENT IN AN ORGANIZED HEALTH CARE EDUCATION/TRAINING PROGRAM

## 2024-12-15 RX ORDER — ONDANSETRON 4 MG/1
4 TABLET, FILM COATED ORAL EVERY 8 HOURS PRN
Qty: 15 TABLET | Refills: 0 | Status: SHIPPED | OUTPATIENT
Start: 2024-12-15

## 2024-12-15 RX ORDER — ONDANSETRON 4 MG/1
4 TABLET, ORALLY DISINTEGRATING ORAL EVERY 6 HOURS PRN
Status: SHIPPED | OUTPATIENT
Start: 2024-12-15

## 2024-12-15 RX ORDER — AZITHROMYCIN 250 MG/1
TABLET, FILM COATED ORAL
Qty: 6 TABLET | Refills: 0 | Status: SHIPPED | OUTPATIENT
Start: 2024-12-15 | End: 2024-12-19

## 2024-12-15 RX ADMIN — ONDANSETRON 4 MG: 4 TABLET, ORALLY DISINTEGRATING ORAL at 18:04

## 2024-12-15 NOTE — LETTER
December 15, 2024     Patient: Hugo Hanna   YOB: 1995   Date of Visit: 12/15/2024       To Whom it May Concern:    Hugo Hanna was seen in my clinic on 12/15/2024.  Please excuse him from work on 12/16/2024.  If you have any questions or concerns, please don't hesitate to call.         Sincerely,          Johnana Ariza, DO

## 2024-12-15 NOTE — PATIENT INSTRUCTIONS
Your symptoms are likely coming from a viral GI bug that has caused some irritation and persistent diarrhea.  I recommend continuing with hydration, very gentle foods such as crackers, soup.  Recommend adding hydration with electrolyte beverage such as Pedialyte, Gatorade, liquid IV.  You may take Zofran for nausea.  If your symptoms fail if they continue to worsen over the next day especially with any increased mucus or blood in the stool, please start the antibiotics.  If your symptoms are not improving in the coming days, please gradually your PCP for further evaluation.  If you develop any worsening symptoms such as increasing abdominal pain, not able to up with fluids, please go to the ER for further evaluation.

## 2024-12-15 NOTE — PROGRESS NOTES
[facility]        NAME: Hugo Hanna is a 29 y.o. male  : 1995    MRN: [id number]  DATE: December 15, 2024  TIME: 5:55 PM    Assessment and Plan   Diarrhea of presumed infectious origin [R19.7]  1. Diarrhea of presumed infectious origin  azithromycin (ZITHROMAX) 250 mg tablet      2. Nausea  ondansetron (ZOFRAN-ODT) dispersible tablet 4 mg    ondansetron (ZOFRAN) 4 mg tablet            Patient Instructions   Your symptoms are likely coming from a viral GI bug that has caused some irritation and persistent diarrhea.  I recommend continuing with hydration, very gentle foods such as crackers, soup.  Recommend adding hydration with electrolyte beverage such as Pedialyte, Gatorade, liquid IV.  You may take Zofran for nausea.  If your symptoms fail if they continue to worsen over the next day especially with any increased mucus or blood in the stool, please start the antibiotics.  If your symptoms are not improving in the coming days, please gradually your PCP for further evaluation.  If you develop any worsening symptoms such as increasing abdominal pain, not able to up with fluids, please go to the ER for further evaluation.    Follow up with PCP in 3-5 days.  Proceed to  ER if symptoms worsen.    If tests have been performed at [facility], our office will contact you with results if changes need to be made to the care plan discussed with you at the visit.  You can review your full results on [facility].    Chief Complaint     Chief Complaint   Patient presents with    Abdominal Pain     Patient reports starting with vomiting and loose stool on Friday . Was seen via a virtual visit was advised to hydrating ,kishor diet , and imodium for worsening symptoms go to urgent care for evaluation. Also reported fever of 102.3 . Symtpoms have not been getting better. Loose stool  and some vomiting on Saturday night . Also reporting epigastric pain radiating to abdomen . B/l lower back / flank pain  with cramping lose bm 2-3 times per hours.Fever resolved on Friday night.     Vomiting    Diarrhea    Headache    Dizziness         History of Present Illness       Patient presents for symptoms that started approximately 5 days ago, started with several episodes of vomiting, fevers, abdominal cramping, headaches, diarrhea.  Vomiting and fevers have resolved over the last couple days but the diarrhea seems to be fluctuating and worse today than yesterday.  Described as watery, brown, has had some mixed in mucus, small mount of blood but unsure if this is due to the frequent bowel movements.  Still nauseous intermittently but no further vomiting.  His whole household has been having GI symptoms but overall seems to be getting over the more quickly.  Has not had any recent antibiotics, no recent travel.  Has epigastric discomfort and generalized abdominal discomfort, pain in his ribs from vomiting.    Abdominal Pain  Associated symptoms include diarrhea, headaches and vomiting.   Vomiting   Associated symptoms include abdominal pain, diarrhea, dizziness and headaches.   Diarrhea   Associated symptoms include abdominal pain, headaches and vomiting.   Headache  Dizziness  Associated symptoms include abdominal pain, headaches and vomiting.       Review of Systems   Review of Systems   Gastrointestinal:  Positive for abdominal pain, diarrhea and vomiting.   Neurological:  Positive for dizziness and headaches.   All other systems reviewed and are negative.        Current Medications       Current Outpatient Medications:     azithromycin (ZITHROMAX) 250 mg tablet, Take 2 tablets today then 1 tablet daily x 4 days, Disp: 6 tablet, Rfl: 0    Blood Glucose Monitoring Suppl (Contour Next Gen Monitor) DONNA, CHECK BLOOD SUGARS TWICE DAILY IN MORNING AND EVENING, Disp: , Rfl:     meloxicam (Mobic) 15 mg tablet, Take 1 tablet (15 mg total) by mouth daily, Disp: 30 tablet, Rfl: 1    ondansetron (ZOFRAN) 4 mg tablet, Take 1 tablet (4  mg total) by mouth every 8 (eight) hours as needed for nausea or vomiting, Disp: 15 tablet, Rfl: 0    ALPRAZolam (XANAX) 1 mg tablet, Take 1 tablet (1 mg total) by mouth daily at bedtime as needed for anxiety Take 1 hour before appointment (Patient not taking: Reported on 11/20/2024), Disp: 1 tablet, Rfl: 0    dicyclomine (BENTYL) 20 mg tablet, Take 1 tablet (20 mg total) by mouth 2 (two) times a day for 7 days (Patient not taking: Reported on 12/15/2024), Disp: 14 tablet, Rfl: 0    Current Facility-Administered Medications:     ondansetron (ZOFRAN-ODT) dispersible tablet 4 mg, 4 mg, Oral, Q6H PRN,     Current Allergies     Allergies as of 12/15/2024 - Reviewed 12/15/2024   Allergen Reaction Noted    Shellfish allergy - food allergy Anaphylaxis 09/13/2017    Other  06/19/2020            The following portions of the patient's history were reviewed and updated as appropriate: allergies, current medications, past family history, past medical history, past social history, past surgical history and problem list.     Past Medical History:   Diagnosis Date    Back pain     Testicle pain        Past Surgical History:   Procedure Laterality Date    FL INJECTION RIGHT SHOULDER (ARTHROGRAM)  02/06/2023    VASECTOMY  06/04/2024       Family History   Problem Relation Age of Onset    Diabetes Mother     Cancer Mother         brain    Heart attack Mother     Snoring Father         suspected MYRIAM    Stroke Father     Cancer Father         colon and throat    Breast cancer Family          Medications have been verified.        Objective   There were no vitals taken for this visit.  No LMP for male patient.       Physical Exam     Physical Exam  Vitals and nursing note reviewed.   Constitutional:       General: He is not in acute distress.     Appearance: Normal appearance. He is not ill-appearing or toxic-appearing.   HENT:      Head: Normocephalic and atraumatic.      Right Ear: External ear normal.      Left Ear: External ear  normal.      Nose: Nose normal.      Mouth/Throat:      Mouth: Mucous membranes are moist.   Eyes:      Extraocular Movements: Extraocular movements intact.   Cardiovascular:      Rate and Rhythm: Normal rate and regular rhythm.      Heart sounds: Normal heart sounds.   Pulmonary:      Effort: Pulmonary effort is normal. No respiratory distress.      Breath sounds: Normal breath sounds. No stridor. No wheezing, rhonchi or rales.   Abdominal:      General: Bowel sounds are increased.      Palpations: Abdomen is soft.      Tenderness: There is abdominal tenderness (soft, + generalized tenderness, no guarding, no rebound) in the right lower quadrant, epigastric area, periumbilical area and left lower quadrant.   Skin:     General: Skin is warm and dry.      Capillary Refill: Capillary refill takes less than 2 seconds.      Findings: No rash.   Neurological:      Mental Status: He is alert.      Gait: Gait normal.   Psychiatric:         Behavior: Behavior normal.

## 2024-12-17 ENCOUNTER — PROCEDURE VISIT (OUTPATIENT)
Age: 29
End: 2024-12-17
Payer: COMMERCIAL

## 2024-12-17 VITALS
WEIGHT: 315 LBS | HEART RATE: 86 BPM | OXYGEN SATURATION: 95 % | SYSTOLIC BLOOD PRESSURE: 126 MMHG | HEIGHT: 73 IN | BODY MASS INDEX: 41.75 KG/M2 | DIASTOLIC BLOOD PRESSURE: 70 MMHG

## 2024-12-17 DIAGNOSIS — M79.18 MYOFASCIAL PAIN ON RIGHT SIDE: ICD-10-CM

## 2024-12-17 DIAGNOSIS — M99.03 SEGMENTAL DYSFUNCTION OF LUMBAR REGION: ICD-10-CM

## 2024-12-17 DIAGNOSIS — M99.02 SEGMENTAL DYSFUNCTION OF THORACIC REGION: ICD-10-CM

## 2024-12-17 DIAGNOSIS — M51.26 LUMBAR DISC HERNIATION: Primary | ICD-10-CM

## 2024-12-17 DIAGNOSIS — M53.3 SACROILIAC JOINT DYSFUNCTION: ICD-10-CM

## 2024-12-17 PROCEDURE — 98941 CHIROPRACT MANJ 3-4 REGIONS: CPT | Performed by: CHIROPRACTOR

## 2024-12-17 NOTE — PROGRESS NOTES
Assessment:  The patient is progressing as expected.    1. Lumbar disc herniation        2. Sacroiliac joint dysfunction        3. Segmental dysfunction of lumbar region        4. Segmental dysfunction of thoracic region        5. Myofascial pain on right side          Plan:  Treatment today consisted of myofascial release via Graston Technique to the right thoracolumbar/lumbar erector spinae, right quadratus lumborum, superior portion of the right right gluteus medius.  GT 3 was used.  Myofascial release via ischemic compression was performed to the right anterior/lateral gluteus medius and right tensor fascia dre as the patient performed active hip flexion movements    Manipulation was performed to the right sacroiliac joint via Zavala low force drop technique. Contact was made to the right ischial tuberosity and the left lateral aspect of the  apex of the sacrum. Manipulation was performed to the lumbar restrictions via manual lumbar flexion distraction technique. Manipulation was performed to the lower thoracic restrictions via grade 1 mobilization techniques in the prone position.    Subjective:  The patient reported feeling better when compared to the last chiropractic visit.  The patient stated that he notices more relief in the right superior lateral gluteal region with addressing the right superior lateral gluteal musculature with ischemic compression as he performs hip flexion movements.  He stated that he is noticing overall improvement in back and right gluteal/hip symptoms.  He stated that he continues to experience the same intensity pain in his left knee but feels more stable in the left knee since beginning the physical therapy. The patient said he continues to experience stiffness in the right thoracolumbar/lumbar region as he has a chronic history of tension and discomfort in that region. He indicated having soreness in the right thoracolumbar/lumbar region, right sacroiliac region, right  superior gluteal region, and right superior lateral gluteal region. He described his right hip pain level as a 4 on a 0-10 pain scale today.  The patient stated that he was not contacted by the spinal physician yet.      Objective:  Negative [place] on the right. Active myofascial trigger points were present in the right thoracolumbar/lumbar erector spinae, quadratus lumborum, gluteus medius, right anterior/lateral gluteus medius, and TFL.  Intersegmental motion was decreased in the thoracic spine including a joint dysfunction at T10-11. Intersegmental motion was decreased in the lumbar spine including joint dysfunctions at L2-3 and L5-S1. Intersegmental motion was decreased in the right sacroiliac joint.         I have used the Epic copy/forward function to compose this note.  I have reviewed my current note to ensure it reflects the current patient status, exam, assessment and plan.    Dictation voice to text software has been used in the creation of this document. Please consider this in light of any contextual or grammatical errors.

## 2024-12-27 ENCOUNTER — TELEPHONE (OUTPATIENT)
Age: 29
End: 2024-12-27

## 2024-12-27 NOTE — TELEPHONE ENCOUNTER
Spoke with patient regarding chiropractic follow up. Declined to schedule at this time will be traveling the next 2 weeks will call when is back to make follow up.

## 2025-02-11 ENCOUNTER — HOSPITAL ENCOUNTER (OUTPATIENT)
Dept: CT IMAGING | Facility: HOSPITAL | Age: 30
Discharge: HOME | End: 2025-02-11
Payer: COMMERCIAL

## 2025-02-11 ENCOUNTER — APPOINTMENT (OUTPATIENT)
Dept: LAB | Age: 30
End: 2025-02-11
Payer: COMMERCIAL

## 2025-02-11 ENCOUNTER — OFFICE VISIT (OUTPATIENT)
Dept: FAMILY MEDICINE CLINIC | Facility: CLINIC | Age: 30
End: 2025-02-11

## 2025-02-11 VITALS
HEART RATE: 79 BPM | BODY MASS INDEX: 55.15 KG/M2 | WEIGHT: 315 LBS | SYSTOLIC BLOOD PRESSURE: 120 MMHG | RESPIRATION RATE: 18 BRPM | OXYGEN SATURATION: 98 % | TEMPERATURE: 98.1 F | DIASTOLIC BLOOD PRESSURE: 73 MMHG

## 2025-02-11 DIAGNOSIS — E11.9 TYPE 2 DIABETES MELLITUS WITHOUT COMPLICATION, WITHOUT LONG-TERM CURRENT USE OF INSULIN (HCC): ICD-10-CM

## 2025-02-11 DIAGNOSIS — R07.0 THROAT PAIN: Primary | ICD-10-CM

## 2025-02-11 DIAGNOSIS — R07.0 THROAT PAIN: ICD-10-CM

## 2025-02-11 LAB
ALBUMIN SERPL BCG-MCNC: 4.5 G/DL (ref 3.5–5)
ALP SERPL-CCNC: 83 U/L (ref 34–104)
ALT SERPL W P-5'-P-CCNC: 74 U/L (ref 7–52)
ANION GAP SERPL CALCULATED.3IONS-SCNC: 9 MMOL/L (ref 4–13)
AST SERPL W P-5'-P-CCNC: 42 U/L (ref 13–39)
BASOPHILS # BLD AUTO: 0.04 THOUSANDS/ΜL (ref 0–0.1)
BASOPHILS NFR BLD AUTO: 0 % (ref 0–1)
BILIRUB SERPL-MCNC: 0.49 MG/DL (ref 0.2–1)
BUN SERPL-MCNC: 13 MG/DL (ref 5–25)
CALCIUM SERPL-MCNC: 10.1 MG/DL (ref 8.4–10.2)
CHLORIDE SERPL-SCNC: 99 MMOL/L (ref 96–108)
CO2 SERPL-SCNC: 29 MMOL/L (ref 21–32)
CREAT SERPL-MCNC: 0.7 MG/DL (ref 0.6–1.3)
CRP SERPL QL: 20.8 MG/L
EOSINOPHIL # BLD AUTO: 0.15 THOUSAND/ΜL (ref 0–0.61)
EOSINOPHIL NFR BLD AUTO: 2 % (ref 0–6)
ERYTHROCYTE [DISTWIDTH] IN BLOOD BY AUTOMATED COUNT: 12.7 % (ref 11.6–15.1)
ERYTHROCYTE [SEDIMENTATION RATE] IN BLOOD: 44 MM/HOUR (ref 0–14)
EST. AVERAGE GLUCOSE BLD GHB EST-MCNC: 226 MG/DL
GFR SERPL CREATININE-BSD FRML MDRD: 128 ML/MIN/1.73SQ M
GLUCOSE SERPL-MCNC: 214 MG/DL (ref 65–140)
HBA1C MFR BLD: 9.5 %
HCT VFR BLD AUTO: 45.1 % (ref 36.5–49.3)
HGB BLD-MCNC: 14.9 G/DL (ref 12–17)
IMM GRANULOCYTES # BLD AUTO: 0.05 THOUSAND/UL (ref 0–0.2)
IMM GRANULOCYTES NFR BLD AUTO: 1 % (ref 0–2)
LYMPHOCYTES # BLD AUTO: 2.8 THOUSANDS/ΜL (ref 0.6–4.47)
LYMPHOCYTES NFR BLD AUTO: 30 % (ref 14–44)
MCH RBC QN AUTO: 29.4 PG (ref 26.8–34.3)
MCHC RBC AUTO-ENTMCNC: 33 G/DL (ref 31.4–37.4)
MCV RBC AUTO: 89 FL (ref 82–98)
MONOCYTES # BLD AUTO: 0.49 THOUSAND/ΜL (ref 0.17–1.22)
MONOCYTES NFR BLD AUTO: 5 % (ref 4–12)
NEUTROPHILS # BLD AUTO: 5.7 THOUSANDS/ΜL (ref 1.85–7.62)
NEUTS SEG NFR BLD AUTO: 62 % (ref 43–75)
NRBC BLD AUTO-RTO: 0 /100 WBCS
PLATELET # BLD AUTO: 261 THOUSANDS/UL (ref 149–390)
PMV BLD AUTO: 10.7 FL (ref 8.9–12.7)
POTASSIUM SERPL-SCNC: 4.2 MMOL/L (ref 3.5–5.3)
PROT SERPL-MCNC: 7.7 G/DL (ref 6.4–8.4)
PTH-INTACT SERPL-MCNC: 31.2 PG/ML (ref 12–88)
RBC # BLD AUTO: 5.07 MILLION/UL (ref 3.88–5.62)
SODIUM SERPL-SCNC: 137 MMOL/L (ref 135–147)
TSH SERPL DL<=0.05 MIU/L-ACNC: 1.99 UIU/ML (ref 0.45–4.5)
WBC # BLD AUTO: 9.23 THOUSAND/UL (ref 4.31–10.16)

## 2025-02-11 PROCEDURE — 83970 ASSAY OF PARATHORMONE: CPT

## 2025-02-11 PROCEDURE — 84443 ASSAY THYROID STIM HORMONE: CPT

## 2025-02-11 PROCEDURE — 85025 COMPLETE CBC W/AUTO DIFF WBC: CPT

## 2025-02-11 PROCEDURE — 86140 C-REACTIVE PROTEIN: CPT

## 2025-02-11 PROCEDURE — 99213 OFFICE O/P EST LOW 20 MIN: CPT | Performed by: FAMILY MEDICINE

## 2025-02-11 PROCEDURE — 36415 COLL VENOUS BLD VENIPUNCTURE: CPT

## 2025-02-11 PROCEDURE — 80053 COMPREHEN METABOLIC PANEL: CPT

## 2025-02-11 PROCEDURE — 83036 HEMOGLOBIN GLYCOSYLATED A1C: CPT

## 2025-02-11 PROCEDURE — 85652 RBC SED RATE AUTOMATED: CPT

## 2025-02-11 PROCEDURE — 70491 CT SOFT TISSUE NECK W/DYE: CPT

## 2025-02-11 RX ADMIN — IOHEXOL 85 ML: 350 INJECTION, SOLUTION INTRAVENOUS at 18:01

## 2025-02-11 NOTE — ASSESSMENT & PLAN NOTE
Recheck A1C.   Lab Results   Component Value Date    HGBA1C 8.6 (H) 09/04/2024     Recheck A1C. Follow up with PCP. Uncontrolled.   Orders:    Hemoglobin A1C; Future

## 2025-02-11 NOTE — LETTER
February 11, 2025     Patient: Hugo Hanna  YOB: 1995  Date of Visit: 2/11/2025      To Whom it May Concern:    Hugo Hanna is under my professional care. Hugo was seen in my office on 2/11/2025. Hugo may return to work on 2/12/25 .    If you have any questions or concerns, please don't hesitate to call.         Sincerely,          Van Quintero,         CC: No Recipients

## 2025-02-11 NOTE — PROGRESS NOTES
Name: Hugo Hanna      : 1995      MRN: [id number]  Encounter Provider: Van Quintero DO  Encounter Date: 2025   Encounter department: [facility] [place]  :  Assessment & Plan  Throat pain  Pain out of proportion with exam.  He has posterior auricular lymphadenopathy on the right not on the left.  He has distributed tenderness and tracking down the cervical lymph node distribution tracking anteriorly towards his neck.  His trachea is midline.  Thyroid not enlarged nontender nonpalpable.  Left side completely unremarkable no lymphadenopathy on the left    With the emergence of difficulty swallowing and increase in size and pain over the past 3 weeks I am concerned of a soft tissue infectious etiology    Even though he has had no fevers or chills his pain is out of proportion and legitimate with his lymphadenopathy.    He states his dental health as well and he has no concerns from an oral standpoint.    His oropharynx was within normal limits however his Mallampati score does not allow me to appreciate his posterior oropharynx appropriately.    Plan to check CT soft tissue neck with contrast stat.    I told him if he was to develop any more difficulty swallowing or encouragement on his airway difficulty breathing to proceed to the emergency department.     Check sed rate and CRP. CBC and TSH  Orders:    CBC and differential; Future    Comprehensive metabolic panel; Future    Sedimentation rate, automated; Future    C-reactive protein; Future    TSH, 3rd generation with Free T4 reflex; Future    PTH, intact; Future    CT soft tissue neck w contrast; Future    Type 2 diabetes mellitus without complication, without long-term current use of insulin (HCC)  Recheck A1C.   Lab Results   Component Value Date    HGBA1C 8.6 (H) 2024     Recheck A1C. Follow up with PCP. Uncontrolled.   Orders:    Hemoglobin A1C; Future           History of Present Illness   21-year-old  male history of type 2 diabetes presenting today for 3-week history of swelling of the right side of his neck.  He states 3 weeks ago he developed the swelling that is tender to palpation.  He denies any fevers or chills however notes that the swelling and pain has been increasing over the past 3 weeks it has been difficult more so the past few days in terms of swallowing.  He denies any difficulty breathing or encroachment on his airway.  He does note that he has a history of thyroid cancer in his father and grandfather with young age of diagnosis.  There is no recent TSH in the chart.  Plan to update labs.  Denies any objective racing heart rate or difference in energy levels.          Review of Systems   Constitutional:  Negative for activity change, fatigue, fever and unexpected weight change.   HENT:  Positive for sore throat and trouble swallowing. Negative for congestion and rhinorrhea.    Eyes:  Negative for visual disturbance.   Respiratory:  Negative for cough, chest tightness, shortness of breath and wheezing.    Cardiovascular:  Negative for chest pain, palpitations and leg swelling.   Gastrointestinal:  Negative for abdominal pain, constipation, diarrhea, nausea and vomiting.   Endocrine: Negative for polydipsia, polyphagia and polyuria.   Genitourinary:  Negative for decreased urine volume, difficulty urinating and urgency.   Musculoskeletal:  Negative for arthralgias, back pain, gait problem and joint swelling.   Skin:  Negative for rash.   Neurological:  Negative for dizziness, seizures, weakness, light-headedness, numbness and headaches.   Psychiatric/Behavioral:  Negative for behavioral problems and confusion.        Objective   /73 (BP Location: Left arm, Patient Position: Sitting, Cuff Size: Large)   Pulse 79   Temp 98.1 °F (36.7 °C) (Temporal)   Resp 18   Wt (!) 190 kg (418 lb)   SpO2 98%   BMI 55.15 kg/m²      Physical Exam  Constitutional:       General: He is not in acute  distress.     Appearance: Normal appearance. He is not ill-appearing or toxic-appearing.   HENT:      Head: Normocephalic and atraumatic.      Right Ear: External ear normal.      Left Ear: External ear normal.      Nose: Nose normal. No congestion.      Mouth/Throat:      Mouth: Mucous membranes are moist.      Pharynx: Oropharynx is clear. No oropharyngeal exudate or posterior oropharyngeal erythema.   Eyes:      Conjunctiva/sclera: Conjunctivae normal.   Neck:      Comments: Postauricular lymphadenopathy on the right.  Pain out of proportion and tenderness to right side of cervical lymph node chain.  Tracking along the anterior aspect of the jaw.  No noticeable collection that is able to be manipulated however pain out of proportion persists with light touch.  Cardiovascular:      Rate and Rhythm: Normal rate and regular rhythm.      Pulses: Normal pulses.      Heart sounds: Normal heart sounds. No murmur heard.     No friction rub. No gallop.   Pulmonary:      Effort: Pulmonary effort is normal. No respiratory distress.      Breath sounds: Normal breath sounds. No wheezing.   Abdominal:      Palpations: Abdomen is soft.   Musculoskeletal:         General: Normal range of motion.      Cervical back: Normal range of motion. Tenderness present.   Lymphadenopathy:      Cervical: Cervical adenopathy present.   Skin:     General: Skin is warm and dry.      Capillary Refill: Capillary refill takes less than 2 seconds.   Neurological:      Mental Status: He is alert and oriented to person, place, and time. Mental status is at baseline.   Psychiatric:         Mood and Affect: Mood normal.         Behavior: Behavior normal.         Thought Content: Thought content normal.         Judgment: Judgment normal.         Van Quintero DO  PGY-2 [facility] [place]   02/11/25, 3:52 PM

## 2025-02-12 ENCOUNTER — RESULTS FOLLOW-UP (OUTPATIENT)
Dept: FAMILY MEDICINE CLINIC | Facility: CLINIC | Age: 30
End: 2025-02-12

## 2025-02-13 ENCOUNTER — OFFICE VISIT (OUTPATIENT)
Dept: FAMILY MEDICINE CLINIC | Facility: CLINIC | Age: 30
End: 2025-02-13

## 2025-02-13 VITALS
OXYGEN SATURATION: 97 % | DIASTOLIC BLOOD PRESSURE: 76 MMHG | WEIGHT: 315 LBS | BODY MASS INDEX: 41.75 KG/M2 | HEIGHT: 73 IN | TEMPERATURE: 97.6 F | SYSTOLIC BLOOD PRESSURE: 120 MMHG | HEART RATE: 87 BPM

## 2025-02-13 DIAGNOSIS — E11.9 TYPE 2 DIABETES MELLITUS WITHOUT COMPLICATION, WITHOUT LONG-TERM CURRENT USE OF INSULIN (HCC): ICD-10-CM

## 2025-02-13 DIAGNOSIS — J34.1 MAXILLARY SINUS CYST: Primary | ICD-10-CM

## 2025-02-13 RX ORDER — BACLOFEN 10 MG/1
10 TABLET ORAL 3 TIMES DAILY
Qty: 30 TABLET | Refills: 0 | Status: SHIPPED | OUTPATIENT
Start: 2025-02-13

## 2025-02-13 RX ORDER — NAPROXEN 500 MG/1
500 TABLET ORAL 2 TIMES DAILY WITH MEALS
Qty: 14 TABLET | Refills: 0 | Status: SHIPPED | OUTPATIENT
Start: 2025-02-13

## 2025-02-13 NOTE — LETTER
February 13, 2025     Patient: Hugo Hanna  YOB: 1995  Date of Visit: 2/13/2025      To Whom it May Concern:    Hugo Hanna is under my professional care. Hugo was seen in my office on 2/13/2025. Hugo may return to work on 2/17/2025 .    If you have any questions or concerns, please don't hesitate to call.         Sincerely,          Chandan Tarango MD        CC: No Recipients

## 2025-02-14 ENCOUNTER — TELEPHONE (OUTPATIENT)
Dept: ADMINISTRATIVE | Facility: OTHER | Age: 30
End: 2025-02-14

## 2025-02-14 PROBLEM — J34.1 MAXILLARY SINUS CYST: Status: ACTIVE | Noted: 2025-02-14

## 2025-02-14 NOTE — ASSESSMENT & PLAN NOTE
Patient presenting for follow up after initial visit on 2/11/2025 for neck pain. At the time, there was significant concern for skin/soft tissue infection of the R neck given physical exam findings and reported history. CT neck was ordered and reviewed. No infection or abscess identified.     Today, he is continuing to report pain over the same area exacerbated by touch and movement of the neck. On exam, he is very tender to even light touch over the upper neck just posterior and inferior to R ear. there is a vague fullness appreciated under the skin not found on the L side. I was unable to identify a discrete structure, but there was a clear difference in tissue texture in the area he was most tender. Skin was intact and there were no overlying bruises, abrasions, or erythema.      We reviewed the CT scan together, specifically his maxillary sinus cyst. It's only mentioned briefly in the radiology report, but it is noticeable in size. We discussed options for further evaluation including ENT referral.     Mucus retention cysts are benign in nature and found more commonly within individuals that have chronic sinus infections/inflammation.    I told him I cannot say definitively that the cyst is causing his symptoms, but it's an abnormality that correlates with the side and general location of his pain. We discussed bridging treatment options until he is able to see ENT. The main concern is that developing infection in this area could be a very serious health threat.   - agreeable to course of Augmentin  - start naproxen  - start baclofen for neck pain and spasm  - ENT referral    Orders:    baclofen 10 mg tablet; Take 1 tablet (10 mg total) by mouth 3 (three) times a day    amoxicillin-clavulanate (AUGMENTIN) 875-125 mg per tablet; Take 1 tablet by mouth every 12 (twelve) hours for 7 days    naproxen (NAPROSYN) 500 mg tablet; Take 1 tablet (500 mg total) by mouth 2 (two) times a day with meals (with meals)     Ambulatory Referral to Otolaryngology; Future

## 2025-02-14 NOTE — ASSESSMENT & PLAN NOTE
Patient diagnosed with DM2 in January 2022. Began seeing weight management who was prescribing his weight loss and DM2 meds. His previous weight management provider is no longer available in the network and he discontinued his visits. A1c in the office on 2/11/2025 was 9.5% up from 8.6% previously. He is not currently taking any medications for DM2  - Asked patient to schedule follow up to reassess and restart treating his diabetes  - scheduled for 3/3/2025

## 2025-02-14 NOTE — TELEPHONE ENCOUNTER
----- Message from Barbara ALBERTO sent at 2/13/2025  3:49 PM EST -----  Regarding: care gap request  02/13/25 3:49 PM    Hello, our patient attached above has had Diabetic Eye Exam completed/performed. Please assist in updating the patient chart by making an External outreach to [facility] facility located in [address] [place] [address]. The date of service is December 2024 or January 2025 Patient unsure.    Thank you,  ROSALINA Schulz

## 2025-02-14 NOTE — PROGRESS NOTES
Name: Hugo Hanna      : 1995      MRN: [id number]  Encounter Provider: Chandan Tarango MD  Encounter Date: 2025   Encounter department: [facility] [place]  :  Assessment & Plan  Maxillary sinus cyst  Patient presenting for follow up after initial visit on 2025 for neck pain. At the time, there was significant concern for skin/soft tissue infection of the R neck given physical exam findings and reported history. CT neck was ordered and reviewed. No infection or abscess identified.     Today, he is continuing to report pain over the same area exacerbated by touch and movement of the neck. On exam, he is very tender to even light touch over the upper neck just posterior and inferior to R ear. there is a vague fullness appreciated under the skin not found on the L side. I was unable to identify a discrete structure, but there was a clear difference in tissue texture in the area he was most tender. Skin was intact and there were no overlying bruises, abrasions, or erythema.      We reviewed the CT scan together, specifically his maxillary sinus cyst. It's only mentioned briefly in the radiology report, but it is noticeable in size. We discussed options for further evaluation including ENT referral.     Mucus retention cysts are benign in nature and found more commonly within individuals that have chronic sinus infections/inflammation.    I told him I cannot say definitively that the cyst is causing his symptoms, but it's an abnormality that correlates with the side and general location of his pain. We discussed bridging treatment options until he is able to see ENT. The main concern is that developing infection in this area could be a very serious health threat.   - agreeable to course of Augmentin  - start naproxen  - start baclofen for neck pain and spasm  - ENT referral    Orders:    baclofen 10 mg tablet; Take 1 tablet (10 mg total) by mouth 3 (three)  times a day    amoxicillin-clavulanate (AUGMENTIN) 875-125 mg per tablet; Take 1 tablet by mouth every 12 (twelve) hours for 7 days    naproxen (NAPROSYN) 500 mg tablet; Take 1 tablet (500 mg total) by mouth 2 (two) times a day with meals (with meals)    Ambulatory Referral to Otolaryngology; Future    Type 2 diabetes mellitus without complication, without long-term current use of insulin ([facility])  Patient diagnosed with DM2 in January 2022. Began seeing weight management who was prescribing his weight loss and DM2 meds. His previous weight management provider is no longer available in the network and he discontinued his visits. A1c in the office on 2/11/2025 was 9.5% up from 8.6% previously. He is not currently taking any medications for DM2  - Asked patient to schedule follow up to reassess and restart treating his diabetes  - scheduled for 3/3/2025                History of Present Illness   Neck Pain   This is a new problem. The current episode started 1 to 4 weeks ago. The problem occurs constantly. The problem has been gradually worsening. The pain is associated with nothing. The pain is present in the right side (R neck just inferior and posterior to the R ear). The quality of the pain is described as shooting and stabbing. The pain is moderate. The symptoms are aggravated by position. Associated symptoms include headaches. Pertinent negatives include no fever, trouble swallowing or weakness. He has tried NSAIDs for the symptoms. The treatment provided no relief.     Review of Systems   Constitutional:  Negative for fever.   HENT:  Negative for trouble swallowing.    Respiratory: Negative.     Cardiovascular: Negative.    Gastrointestinal: Negative.    Genitourinary: Negative.    Musculoskeletal:  Positive for neck pain. Negative for neck stiffness.   Neurological:  Positive for headaches. Negative for dizziness, speech difficulty and weakness.   Psychiatric/Behavioral: Negative.         Objective   /76  "(BP Location: Left arm, Patient Position: Sitting, Cuff Size: Large)   Pulse 87   Temp 97.6 °F (36.4 °C) (Temporal)   Ht 6' 1\" (1.854 m)   Wt (!) 188 kg (415 lb)   SpO2 97%   BMI 54.75 kg/m²      Physical Exam  Vitals reviewed.   Constitutional:       General: He is not in acute distress.     Appearance: Normal appearance. He is well-developed. He is not ill-appearing.   HENT:      Head: Normocephalic and atraumatic.      Jaw: There is normal jaw occlusion.      Salivary Glands: Right salivary gland is not diffusely enlarged or tender. Left salivary gland is not diffusely enlarged or tender.        Comments: area of tenderness and fullness at area of red [place]     Right Ear: External ear normal.      Left Ear: External ear normal.      Nose: Nose normal.      Mouth/Throat:      Pharynx: Oropharynx is clear.   Eyes:      Extraocular Movements: Extraocular movements intact.      Conjunctiva/sclera: Conjunctivae normal.   Neck:      Trachea: Trachea normal.   Cardiovascular:      Rate and Rhythm: Normal rate and regular rhythm.      Heart sounds: No murmur heard.  Pulmonary:      Effort: Pulmonary effort is normal. No respiratory distress.      Breath sounds: Normal breath sounds.   Abdominal:      General: There is no distension.   Musculoskeletal:         General: No signs of injury.      Cervical back: Neck supple. No edema, erythema, signs of trauma or rigidity. Pain with movement present. Decreased range of motion.   Lymphadenopathy:      Cervical: Cervical adenopathy present.   Skin:     Capillary Refill: Capillary refill takes less than 2 seconds.      Findings: No bruising or rash.   Neurological:      Mental Status: He is alert and oriented to person, place, and time.      Gait: Gait normal.   Psychiatric:         Mood and Affect: Mood normal.         Behavior: Behavior normal.       Chandan Tarango MD  "

## 2025-02-14 NOTE — LETTER
Diabetic Eye Exam Form    Date Requested: 25  Patient: Hugo Hanna  Patient : 1995   Referring Provider: Chandan Tarango MD      DIABETIC Eye Exam Date _______________________________      Type of Exam MUST be documented for Diabetic Eye Exams. Please CHECK ONE.     Retinal Exam       Dilated Retinal Exam       OCT       Optomap-Iris Exam      Fundus Photography       Left Eye - Please check Retinopathy or No Retinopathy        Exam did show retinopathy    Exam did not show retinopathy       Right Eye - Please check Retinopathy or No Retinopathy       Exam did show retinopathy    Exam did not show retinopathy       Comments __________________________________________________________    Practice Providing Exam ______________________________________________    Exam Performed By (print name) _______________________________________      Provider Signature ___________________________________________________      These reports are needed for  compliance.  Please fax this completed form and a copy of the Diabetic Eye Exam report to our office located at [address] [place] [address] as soon as possible via Fax [phone] attention Cristel: Phone [phone]  We thank you for your assistance in treating our mutual patient.

## 2025-02-14 NOTE — TELEPHONE ENCOUNTER
Upon review of the In Basket request and the patient's chart, initial outreach has been made via fax to facility. Please see Contacts section for details.     Thank you  Cristel Russell MA

## 2025-02-18 NOTE — TELEPHONE ENCOUNTER
As a follow-up, a second attempt has been made for outreach via fax to facility. Please see Contacts section for details.    Thank you  Cristel Russell MA

## 2025-02-24 ENCOUNTER — TELEPHONE (OUTPATIENT)
Age: 30
End: 2025-02-24

## 2025-02-24 NOTE — TELEPHONE ENCOUNTER
Patient called to schedule appointment. Patient seeing in the pasty ear dr Garza at [facility] provided patient with their number.

## 2025-02-25 NOTE — TELEPHONE ENCOUNTER
As a final attempt, a third outreach has been made via fax to facility. Please see Contacts section for details. This encounter will be closed and completed by end of day. Should we receive the requested information because of previous outreach attempts, the requested patient's chart will be updated appropriately.     Thank you  Cristel Russell MA

## 2025-03-03 ENCOUNTER — OFFICE VISIT (OUTPATIENT)
Dept: FAMILY MEDICINE CLINIC | Facility: CLINIC | Age: 30
End: 2025-03-03

## 2025-03-03 VITALS
WEIGHT: 315 LBS | HEIGHT: 73 IN | BODY MASS INDEX: 41.75 KG/M2 | SYSTOLIC BLOOD PRESSURE: 146 MMHG | OXYGEN SATURATION: 99 % | TEMPERATURE: 97.8 F | HEART RATE: 97 BPM | RESPIRATION RATE: 20 BRPM | DIASTOLIC BLOOD PRESSURE: 89 MMHG

## 2025-03-03 DIAGNOSIS — E11.9 TYPE 2 DIABETES MELLITUS WITHOUT COMPLICATION, WITHOUT LONG-TERM CURRENT USE OF INSULIN (HCC): Primary | ICD-10-CM

## 2025-03-03 PROCEDURE — 99213 OFFICE O/P EST LOW 20 MIN: CPT | Performed by: FAMILY MEDICINE

## 2025-03-04 PROCEDURE — 87070 CULTURE OTHR SPECIMN AEROBIC: CPT | Performed by: OTOLARYNGOLOGY

## 2025-03-04 PROCEDURE — 87205 SMEAR GRAM STAIN: CPT | Performed by: OTOLARYNGOLOGY

## 2025-03-04 NOTE — ASSESSMENT & PLAN NOTE
Patient initially diagnosed with diabetes in August 2023 on routine screening labs.  Initially, he established with weight management who prescribed metformin and subsequently Ozempic.  He experienced significant adverse effects while taking the Ozempic and it was subsequently discontinued.  He has not seen weight management for some time and he is not currently taking any diabetes medications.    His A1c was checked at a recent office visit for a separate problem.  His A1c has steadily risen to 9.5% most recently.  He agreed to return for follow-up and discuss his diabetes diagnosis.    Today we reviewed his past medications and the medication associated adverse effects.  Currently, he is asymptomatic in regard to any diabetic associated issues such as peripheral neuropathy or retinopathy.  We agreed to proceed steadily and assess his response to any new medications or any dosage adjustments.  He agreed for close follow-up over the next few months while we trend his response to the basic diabetes medications and establish an appropriate regimen.  - start metformin 500mg BID  - Discussed effectiveness of metformin and likely upcoming dosage changes should he tolerate the medication  - Discussed separate medications that are also indicated for all diabetics including statins and ACE inhibitor/ARB's.  We agreed to hold off on starting those medications today and will readdress them in the future.  - Follow-up in 1 month for med review and ongoing management planning  - Diabetic foot exam completed today    Orders:    Albumin / creatinine urine ratio; Future    metFORMIN (GLUCOPHAGE) 500 mg tablet; Take 1 tablet (500 mg total) by mouth 2 (two) times a day with meals

## 2025-03-04 NOTE — PROGRESS NOTES
Name: Hugo Hanna      : 1995      MRN: [id number]  Encounter Provider: Chandan Tarango MD  Encounter Date: 3/3/2025   Encounter department: [facility] [place]  :  Assessment & Plan  Type 2 diabetes mellitus without complication, without long-term current use of insulin (HCC)  Patient initially diagnosed with diabetes in 2023 on routine screening labs.  Initially, he established with weight management who prescribed metformin and subsequently Ozempic.  He experienced significant adverse effects while taking the Ozempic and it was subsequently discontinued.  He has not seen weight management for some time and he is not currently taking any diabetes medications.    His A1c was checked at a recent office visit for a separate problem.  His A1c has steadily risen to 9.5% most recently.  He agreed to return for follow-up and discuss his diabetes diagnosis.    Today we reviewed his past medications and the medication associated adverse effects.  Currently, he is asymptomatic in regard to any diabetic associated issues such as peripheral neuropathy or retinopathy.  We agreed to proceed steadily and assess his response to any new medications or any dosage adjustments.  He agreed for close follow-up over the next few months while we trend his response to the basic diabetes medications and establish an appropriate regimen.  - start metformin 500mg BID  - Discussed effectiveness of metformin and likely upcoming dosage changes should he tolerate the medication  - Discussed separate medications that are also indicated for all diabetics including statins and ACE inhibitor/ARB's.  We agreed to hold off on starting those medications today and will readdress them in the future.  - Follow-up in 1 month for med review and ongoing management planning  - Diabetic foot exam completed today    Orders:    Albumin / creatinine urine ratio; Future    metFORMIN (GLUCOPHAGE) 500 mg  "tablet; Take 1 tablet (500 mg total) by mouth 2 (two) times a day with meals           History of Present Illness   Diabetes  He presents for his initial diabetic visit. He has type 2 diabetes mellitus. No MedicAlert identification noted. The initial diagnosis of diabetes was made 2 years ago. His disease course has been worsening. There are no hypoglycemic associated symptoms. Pertinent negatives for hypoglycemia include no dizziness. There are no diabetic associated symptoms. Pertinent negatives for diabetes include no chest pain. There are no diabetic complications. Risk factors for coronary artery disease include male sex and diabetes mellitus. Current diabetic treatment includes oral agent (monotherapy). Meal planning includes avoidance of concentrated sweets. He has had a previous visit with a dietitian (in past). An ACE inhibitor/angiotensin II receptor blocker is not being taken. He does not see a podiatrist.Eye exam is current (will provide recods).     Review of Systems   Constitutional:  Negative for chills, fever and unexpected weight change.   HENT:  Positive for congestion and sinus pain.    Eyes:  Negative for photophobia and visual disturbance.   Respiratory:  Negative for cough and shortness of breath.    Cardiovascular:  Negative for chest pain and palpitations.   Gastrointestinal:  Negative for abdominal pain and vomiting.   Genitourinary:  Negative for hematuria.   Musculoskeletal:  Negative for back pain.   Skin:  Negative for rash.   Neurological:  Negative for dizziness.   Psychiatric/Behavioral: Negative.     All other systems reviewed and are negative.      Objective   /89   Pulse 97   Temp 97.8 °F (36.6 °C) (Temporal)   Resp 20   Ht 6' 1\" (1.854 m)   Wt (!) 189 kg (416 lb 12.8 oz)   SpO2 99%   BMI 54.99 kg/m²      Physical Exam  Vitals reviewed.   Constitutional:       General: He is not in acute distress.     Appearance: Normal appearance. He is not ill-appearing.   Eyes:      " Extraocular Movements: Extraocular movements intact.   Cardiovascular:      Rate and Rhythm: Normal rate and regular rhythm.   Pulmonary:      Effort: Pulmonary effort is normal. No respiratory distress.   Musculoskeletal:      Right lower leg: No edema.      Left lower leg: No edema.   Skin:     General: Skin is warm and dry.      Findings: No rash.   Neurological:      Mental Status: He is alert. Mental status is at baseline.   Psychiatric:         Mood and Affect: Mood normal.         Behavior: Behavior normal.       Chandan Tarango MD

## 2025-03-27 DIAGNOSIS — E11.9 TYPE 2 DIABETES MELLITUS WITHOUT COMPLICATION, WITHOUT LONG-TERM CURRENT USE OF INSULIN (HCC): ICD-10-CM

## 2025-03-31 ENCOUNTER — OFFICE VISIT (OUTPATIENT)
Dept: FAMILY MEDICINE CLINIC | Facility: CLINIC | Age: 30
End: 2025-03-31

## 2025-03-31 VITALS
OXYGEN SATURATION: 99 % | WEIGHT: 315 LBS | DIASTOLIC BLOOD PRESSURE: 105 MMHG | HEIGHT: 73 IN | BODY MASS INDEX: 41.75 KG/M2 | SYSTOLIC BLOOD PRESSURE: 155 MMHG | RESPIRATION RATE: 20 BRPM | TEMPERATURE: 98 F | HEART RATE: 76 BPM

## 2025-03-31 DIAGNOSIS — E11.9 TYPE 2 DIABETES MELLITUS WITHOUT COMPLICATION, WITHOUT LONG-TERM CURRENT USE OF INSULIN (HCC): Primary | ICD-10-CM

## 2025-03-31 DIAGNOSIS — R03.0 ELEVATED BLOOD PRESSURE READING: ICD-10-CM

## 2025-03-31 PROCEDURE — 99213 OFFICE O/P EST LOW 20 MIN: CPT | Performed by: FAMILY MEDICINE

## 2025-03-31 RX ORDER — LISINOPRIL 10 MG/1
10 TABLET ORAL DAILY
Qty: 90 TABLET | Refills: 1 | Status: SHIPPED | OUTPATIENT
Start: 2025-03-31 | End: 2025-04-11 | Stop reason: SINTOL

## 2025-03-31 NOTE — ASSESSMENT & PLAN NOTE
"  Patient doing well with metformin and has been making significant dietary changes since last visit.  He has been checking his sugars 2-3 times per day.  He did not have a log but was able to recall his general blood sugar trend.  Interestingly, his blood sugars are very sensitive to periods of fasting.  Throughout the day if he does not eat even a small meal his blood sugars will rise, once he eats and he checks his blood sugar after his meal, his blood sugars drop significantly.  He notes this trend has been consistent and repeatable over time.  In general his blood sugar range has been 115-200 with occasional increases to 225.     He also notes that his blood sugar tends to be higher during times of stress either at home or work.  This would be consistent with high levels of cortisol driving his blood sugars up in addition to his inherent insulin insensitivity.  He has been making strides at reducing his stress and increasing his quantity and quality of sleep. After reviewing this atypical trend with him, he said that his previous weight management provider also noted this and described it as a phenomenon seen in individuals where meals are a \"reward\".  In summary, during extended periods of no caloric intake his body views this as a stress response, driving his cortisol levels up and increasing his blood sugar.  The noted drop in blood sugars after eating reflects the drop in cortisol.  - Continue dietary changes and maintaining consistent meal schedule   - Continue metformin at current dose.  Discussed options at his next A1c check including increasing metformin versus adding GLP-1 agonist.  He has tried Ozempic in the past but discontinued the medication due to GI side effects.  Brought up potentially trying Mounjaro as an alternative.  Patient agreeable should we decide on adding a GLP-1  - Reviewed statin medication and the benefits for patients with type 2 diabetes.  Starting lisinopril today, will likely " start atorvastatin at next visit.    Orders:    lisinopril (ZESTRIL) 10 mg tablet; Take 1 tablet (10 mg total) by mouth daily

## 2025-03-31 NOTE — PROGRESS NOTES
"Name: Hugo Hanna      : 1995      MRN: [id number]  Encounter Provider: Chandan Tarango MD  Encounter Date: 3/31/2025   Encounter department: [facility] [place]  :  Assessment & Plan  Type 2 diabetes mellitus without complication, without long-term current use of insulin ([facility])    Patient doing well with metformin and has been making significant dietary changes since last visit.  He has been checking his sugars 2-3 times per day.  He did not have a log but was able to recall his general blood sugar trend.  Interestingly, his blood sugars are very sensitive to periods of fasting.  Throughout the day if he does not eat even a small meal his blood sugars will rise, once he eats and he checks his blood sugar after his meal, his blood sugars drop significantly.  He notes this trend has been consistent and repeatable over time.  In general his blood sugar range has been 115-200 with occasional increases to 225.     He also notes that his blood sugar tends to be higher during times of stress either at home or work.  This would be consistent with high levels of cortisol driving his blood sugars up in addition to his inherent insulin insensitivity.  He has been making strides at reducing his stress and increasing his quantity and quality of sleep. After reviewing this atypical trend with him, he said that his previous weight management provider also noted this and described it as a phenomenon seen in individuals where meals are a \"reward\".  In summary, during extended periods of no caloric intake his body views this as a stress response, driving his cortisol levels up and increasing his blood sugar.  The noted drop in blood sugars after eating reflects the drop in cortisol.  - Continue dietary changes and maintaining consistent meal schedule   - Continue metformin at current dose.  Discussed options at his next A1c check including increasing metformin versus adding GLP-1 " agonist.  He has tried Ozempic in the past but discontinued the medication due to GI side effects.  Brought up potentially trying Mounjaro as an alternative.  Patient agreeable should we decide on adding a GLP-1  - Reviewed statin medication and the benefits for patients with type 2 diabetes.  Starting lisinopril today, will likely start atorvastatin at next visit.    Orders:    lisinopril (ZESTRIL) 10 mg tablet; Take 1 tablet (10 mg total) by mouth daily    Elevated blood pressure reading  Blood pressure elevated in the office today at 155/105.  Previous reading also elevated above goal for patients with type 2 diabetes.  - Continue stress reduction techniques  - Start lisinopril 10 mg daily today  Orders:    lisinopril (ZESTRIL) 10 mg tablet; Take 1 tablet (10 mg total) by mouth daily           History of Present Illness   Diabetes  He presents for his follow-up diabetic visit. He has type 2 diabetes mellitus. No MedicAlert identification noted. The initial diagnosis of diabetes was made 1 year ago. His disease course has been fluctuating. There are no hypoglycemic associated symptoms. Pertinent negatives for hypoglycemia include no dizziness. There are no diabetic associated symptoms. Pertinent negatives for diabetes include no chest pain. There are no hypoglycemic complications. Symptoms are stable. There are no diabetic complications. Risk factors for coronary artery disease include male sex and diabetes mellitus. Current diabetic treatment includes oral agent (monotherapy). He is compliant with treatment all of the time. He is following a generally healthy diet. Meal planning includes avoidance of concentrated sweets and calorie counting. He has had a previous visit with a dietitian. An ACE inhibitor/angiotensin II receptor blocker is being taken. He does not see a podiatrist.Eye exam is not current.     Review of Systems   Constitutional:  Negative for chills, fever and unexpected weight change.   HENT:  "Negative.     Eyes:  Negative for photophobia and visual disturbance.   Respiratory:  Negative for cough and shortness of breath.    Cardiovascular:  Negative for chest pain and palpitations.   Gastrointestinal:  Negative for abdominal pain and vomiting.   Genitourinary:  Negative for hematuria.   Musculoskeletal:  Negative for back pain.   Skin:  Negative for rash.   Neurological:  Negative for dizziness.   Psychiatric/Behavioral: Negative.     All other systems reviewed and are negative.      Objective   BP (!) 155/105   Pulse 76   Temp 98 °F (36.7 °C) (Temporal)   Resp 20   Ht 6' 1\" (1.854 m)   Wt (!) 187 kg (412 lb)   SpO2 99%   BMI 54.36 kg/m²      Physical Exam  Vitals reviewed.   Constitutional:       General: He is not in acute distress.     Appearance: Normal appearance. He is well-developed. He is not ill-appearing.   HENT:      Head: Normocephalic and atraumatic.      Right Ear: External ear normal.      Left Ear: External ear normal.      Nose: Nose normal.      Mouth/Throat:      Pharynx: Oropharynx is clear.   Eyes:      Extraocular Movements: Extraocular movements intact.      Conjunctiva/sclera: Conjunctivae normal.   Cardiovascular:      Rate and Rhythm: Normal rate and regular rhythm.      Heart sounds: No murmur heard.  Pulmonary:      Effort: Pulmonary effort is normal. No respiratory distress.      Breath sounds: Normal breath sounds.   Abdominal:      General: There is no distension.   Musculoskeletal:         General: No signs of injury.      Cervical back: Normal range of motion and neck supple.   Skin:     Capillary Refill: Capillary refill takes less than 2 seconds.      Findings: No bruising or rash.   Neurological:      Mental Status: He is alert and oriented to person, place, and time.      Gait: Gait normal.   Psychiatric:         Mood and Affect: Mood normal.         Behavior: Behavior normal.       Chandan Tarango MD    "

## 2025-04-01 PROBLEM — R03.0 ELEVATED BLOOD PRESSURE READING: Status: ACTIVE | Noted: 2025-04-01

## 2025-04-01 NOTE — ASSESSMENT & PLAN NOTE
Blood pressure elevated in the office today at 155/105.  Previous reading also elevated above goal for patients with type 2 diabetes.  - Continue stress reduction techniques  - Start lisinopril 10 mg daily today  Orders:    lisinopril (ZESTRIL) 10 mg tablet; Take 1 tablet (10 mg total) by mouth daily

## 2025-04-09 ENCOUNTER — TELEPHONE (OUTPATIENT)
Dept: FAMILY MEDICINE CLINIC | Facility: CLINIC | Age: 30
End: 2025-04-09

## 2025-04-09 DIAGNOSIS — R10.9 ABDOMINAL CRAMPING: Primary | ICD-10-CM

## 2025-04-09 DIAGNOSIS — R03.0 ELEVATED BLOOD PRESSURE READING: ICD-10-CM

## 2025-04-09 DIAGNOSIS — E11.9 TYPE 2 DIABETES MELLITUS WITHOUT COMPLICATION, WITHOUT LONG-TERM CURRENT USE OF INSULIN (HCC): ICD-10-CM

## 2025-04-09 NOTE — TELEPHONE ENCOUNTER
Patient called wanting to let Dr. Tarango know he has been having some adverse reactions to the lisinopril. He has been having some stomach distress, nauseous, diarrhea for the past 3 days. Patient has been dizzy as well. He wants to know if he should continue taking the medication or discontinue.

## 2025-04-11 RX ORDER — DICYCLOMINE HYDROCHLORIDE 10 MG/1
10 CAPSULE ORAL 3 TIMES DAILY PRN
Qty: 30 CAPSULE | Refills: 0 | Status: SHIPPED | OUTPATIENT
Start: 2025-04-11

## 2025-04-11 NOTE — TELEPHONE ENCOUNTER
called to talk to patient about new symptoms. no answer, left voicemail, will send mychart message and try phone call again this afternoon    Chandan Tarango MD

## 2025-04-11 NOTE — TELEPHONE ENCOUNTER
spoke with patient; confirmed discontinuation of lisinopril. symptoms improving steadily after discontinuation on wednesday. Still some minor cramping.    - Advised to not restart the lisinopril, will; d/c from list  - sent bentyl to pharmacy in case symptoms continue  - Discussed options for restarting alternative BP med. He will monitor at home closely for next 7 days. IF BP within 135-150 range regularly, can wait until 5/12 to re-address new medication  - If SBP >155 consistently, will come in sooner     Chandan Tarango MD

## 2025-06-05 ENCOUNTER — TELEPHONE (OUTPATIENT)
Dept: FAMILY MEDICINE CLINIC | Facility: CLINIC | Age: 30
End: 2025-06-05

## 2025-06-05 NOTE — TELEPHONE ENCOUNTER
The following blood work order (s) for this patient is overdue and were expected to be done on 3/3/25.     Blood work ordered and date: 3/3/25  Albumin / creatinine urine ratio     Attending supervising the visit related with this order (s) is Dr. Garvey  on 3/3/25 with clinician Dr. Tarango.     Last visit on 3/31/25.  Next visit was scheduled for 5/12/25 and patient did not show.    Attending, please review this order (s) if needs to be canceled or patient needs to be called as a reminder. Thank you.

## 2025-06-13 ENCOUNTER — APPOINTMENT (OUTPATIENT)
Dept: RADIOLOGY | Age: 30
End: 2025-06-13
Attending: NURSE PRACTITIONER
Payer: COMMERCIAL

## 2025-06-13 ENCOUNTER — OFFICE VISIT (OUTPATIENT)
Dept: URGENT CARE | Age: 30
End: 2025-06-13
Payer: COMMERCIAL

## 2025-06-13 VITALS
DIASTOLIC BLOOD PRESSURE: 91 MMHG | HEART RATE: 81 BPM | RESPIRATION RATE: 20 BRPM | TEMPERATURE: 97.7 F | SYSTOLIC BLOOD PRESSURE: 142 MMHG | OXYGEN SATURATION: 98 %

## 2025-06-13 DIAGNOSIS — M25.562 CHRONIC PAIN OF LEFT KNEE: Primary | ICD-10-CM

## 2025-06-13 DIAGNOSIS — M25.562 CHRONIC PAIN OF LEFT KNEE: ICD-10-CM

## 2025-06-13 DIAGNOSIS — G89.29 CHRONIC PAIN OF LEFT KNEE: ICD-10-CM

## 2025-06-13 DIAGNOSIS — G89.29 CHRONIC PAIN OF LEFT KNEE: Primary | ICD-10-CM

## 2025-06-13 PROCEDURE — 73564 X-RAY EXAM KNEE 4 OR MORE: CPT

## 2025-06-13 PROCEDURE — 99213 OFFICE O/P EST LOW 20 MIN: CPT | Performed by: NURSE PRACTITIONER

## 2025-06-13 RX ORDER — MELOXICAM 15 MG/1
15 TABLET ORAL DAILY
Qty: 14 TABLET | Refills: 0 | Status: SHIPPED | OUTPATIENT
Start: 2025-06-13

## 2025-06-13 NOTE — ASSESSMENT & PLAN NOTE
Initial imaging negative for acute findings.  Will await official radiology read and notify you if the plan of care changes.  Begin meloxicam 15mg daily for 14 days.  Do not take any ibuprofen or naproxen during this time.  Ice and elevate left knee  Referral back to orthopedic for further evaluation and treatment  Referral to physical therapy     Follow up with PCP in 3-5 days.  Proceed to  ER if symptoms worsen.     Orders:    XR knee 4+ vw left injury; Future    Ambulatory Referral to Orthopedic Surgery; Future    meloxicam (Mobic) 15 mg tablet; Take 1 tablet (15 mg total) by mouth daily    Ambulatory Referral to Physical Therapy; Future

## 2025-06-13 NOTE — PROGRESS NOTES
Name: Hugo Hanna      : 1995      MRN: [id number]  Encounter Provider: RANDY Andrade  Encounter Date: 2025   Encounter department: [facility]  :  Assessment & Plan  Chronic pain of left knee  Initial imaging negative for acute findings.  Will await official radiology read and notify you if the plan of care changes.  Begin meloxicam 15mg daily for 14 days.  Do not take any ibuprofen or naproxen during this time.  Ice and elevate left knee  Referral back to orthopedic for further evaluation and treatment  Referral to physical therapy     Follow up with PCP in 3-5 days.  Proceed to  ER if symptoms worsen.     Orders:    XR knee 4+ vw left injury; Future    Ambulatory Referral to Orthopedic Surgery; Future    meloxicam (Mobic) 15 mg tablet; Take 1 tablet (15 mg total) by mouth daily    Ambulatory Referral to Physical Therapy; Future        History of Present Illness   Knee Pain   Pertinent negatives include no numbness.     Hugo Hanna is a 29 y.o. male who presents for evaluation of left knee pain.  Patient states that he has chronic left knee pain and was evaluated by orthopedics and treated by physical therapy in the past. He states he had an MRI done (2024) which was negative for a meniscus tear at that time and Ortho referred him to physical therapy for strengthening. At that time he was diagnosed with patellar tendinitis and was given meloxicam 15 mg for 30 days.  Patient also reports he has a history of cervical (cervical radiculopathy) and lumbar pain and was seen by pain and spine in the past.  He reports that the left knee pain has become increasingly worse today.  He has been doing the stretches that physical therapy gave him and putting ice to his left knee.  He states he had great difficulty trying to put his pants on this morning and the pain also increases when he is doing the stairs.  He describes the pain as sharp, just underneath his patellar  region.      Review of Systems   Constitutional:  Negative for chills and fever.   Musculoskeletal:  Positive for arthralgias.        Left knee chronic   Skin:  Negative for color change and wound.   Neurological:  Negative for dizziness, weakness, light-headedness, numbness and headaches.   All other systems reviewed and are negative.         Objective   /91 (BP Location: Left arm, Patient Position: Sitting, Cuff Size: Adult)   Pulse 81   Temp 97.7 °F (36.5 °C) (Tympanic)   Resp 20   SpO2 98%      Physical Exam  Vitals and nursing note reviewed.   Constitutional:       Appearance: Normal appearance.   HENT:      Head: Normocephalic and atraumatic.   Pulmonary:      Effort: Pulmonary effort is normal.     Musculoskeletal:         General: Tenderness present. No swelling, deformity or signs of injury.      Left knee: Bony tenderness present. No swelling, deformity, effusion, erythema, ecchymosis or crepitus. Decreased range of motion. Tenderness present over the patellar tendon. Normal alignment. Normal pulse.      Comments: Bony tenderness below patella     Skin:     General: Skin is warm and dry.      Findings: No bruising or erythema.     Neurological:      Mental Status: He is alert.

## 2025-06-13 NOTE — PATIENT INSTRUCTIONS
Initial imaging negative for acute findings.  Will await official radiology read and notify you if the plan of care changes.  Begin meloxicam 15mg daily for 14 days.  Do not take any ibuprofen or naproxen during this time.  Ice and elevate left knee  Referral back to orthopedic for further evaluation and treatment  Referral to physical therapy     Follow up with PCP in 3-5 days.  Proceed to  ER if symptoms worsen.     If tests are performed, our office will contact you with results only if changes need to made to the care plan discussed with you at the visit. You can review your full results on [facility] Mychart.

## 2025-06-18 NOTE — TELEPHONE ENCOUNTER
Patient has been scheduled for a Diabetes follow up with Dr. Quintero on 7/3/25 per patient preference due to work schedule and urine test will be collected at appointment time.

## 2025-06-23 ENCOUNTER — EVALUATION (OUTPATIENT)
Dept: PHYSICAL THERAPY | Age: 30
End: 2025-06-23
Attending: NURSE PRACTITIONER
Payer: COMMERCIAL

## 2025-06-23 DIAGNOSIS — M25.562 CHRONIC PAIN OF LEFT KNEE: ICD-10-CM

## 2025-06-23 DIAGNOSIS — G89.29 CHRONIC PAIN OF LEFT KNEE: ICD-10-CM

## 2025-06-23 PROCEDURE — 97110 THERAPEUTIC EXERCISES: CPT | Performed by: PHYSICAL THERAPIST

## 2025-06-23 PROCEDURE — 97161 PT EVAL LOW COMPLEX 20 MIN: CPT | Performed by: PHYSICAL THERAPIST

## 2025-06-23 NOTE — PROGRESS NOTES
PT Evaluation     Today's date: 2025  Patient name: Hugo Hanna  : 1995  MRN: [id number]  Referring provider: Alma Delia Rodriges CRNP  Dx: No diagnosis found.               Assessment  Impairments: abnormal gait, abnormal muscle tone, abnormal or restricted ROM, impaired physical strength, lacks appropriate home exercise program and pain with function    Assessment details: Pt reports to PT with cc of continued L knee pain. Pt reports pain was fairly consistent until roughly 2 weeks ago when pain significantly increased. Pt presents with pain in patellar tendon/intrapatellar bursa region. Pt will trial high rep slant board squats and contact office in 1 week unless symptoms worsen.     Goals  In 4 weeks pt will:  -Be independent with phase I of HEP  -Increase LE strength by 1/2 grade  -Increase LE ROM by 10 degrees    By discharge pt will:  -Be independent with Phase II of HEP  -Demonstrate full LE strength  -Demonstrate full LE ROM  -Report minimal difficulty with ADLs    Plan  Patient would benefit from: skilled physical therapy    Planned therapy interventions: abdominal trunk stabilization, joint mobilization, manual therapy, neuromuscular re-education, strengthening, stretching, therapeutic activities, therapeutic exercise, functional ROM exercises and home exercise program    Frequency: 1-2x week  Plan of Care beginning date: 2025  Plan of Care expiration date: 2025  Treatment plan discussed with: patient        Subjective    Objective     Passive Range of Motion   Left Knee   Prone flexion: WFL  Extension: WFL  Left Ankle/Foot    Dorsiflexion (ke): 12 degrees     Strength/Myotome Testing     Left Hip   Planes of Motion   Abduction: 4    Left Knee   Flexion: 4  Extension: 4    Right Knee   Flexion: 4  Extension: 4             Precautions: N/A      Manuals                                                                 Neuro Re-Ed                                                                                                         Ther Ex             Slant board squat 3x15 1x a day  15'                                                                                                       Ther Activity                                       Gait Training                                       Modalities

## 2025-06-26 ENCOUNTER — TELEMEDICINE (OUTPATIENT)
Dept: OTHER | Facility: HOSPITAL | Age: 30
End: 2025-06-26
Payer: COMMERCIAL

## 2025-06-26 DIAGNOSIS — B37.9 CANDIDIASIS: Primary | ICD-10-CM

## 2025-06-26 PROCEDURE — 99213 OFFICE O/P EST LOW 20 MIN: CPT | Performed by: NURSE PRACTITIONER

## 2025-06-26 RX ORDER — NYSTATIN 100000 U/G
CREAM TOPICAL 2 TIMES DAILY
Qty: 30 G | Refills: 1 | Status: SHIPPED | OUTPATIENT
Start: 2025-06-26 | End: 2025-07-03

## 2025-06-26 NOTE — PROGRESS NOTES
Virtual Regular Visit  Name: Hugo Hanna      : 1995      MRN: [id number]  Encounter Provider: Your Video Visit Provider  Encounter Date: 2025   Encounter department: VIRTUAL CARE       Verification of patient location:  Patient is located at Home in the following state in which I hold an active license [place] :  Assessment & Plan  Candidiasis    Orders:    nystatin (MYCOSTATIN) cream; Apply topically 2 (two) times a day for 7 days        Encounter provider Your Video Visit Provider    The patient was identified by name and date of birth. Hugo Hanna was informed that this is a telemedicine visit and that the visit is being conducted through the Epic Embedded platform. He agrees to proceed..  My office door was closed. No one else was in the room. He acknowledged consent and understanding of privacy and security of the video platform. The patient has agreed to participate and understands they can discontinue the visit at any time.    Patient is aware this is a billable service.     History obtained from: patient  History of Present Illness     This is a 29 year old male here today for video visit.  He states he has red rash on his chest (breast), under his arm and penile region at base of penis there is a skin fold at this region.  He states he has been trying to avoid deodorants.  He states he was having has redness and itching.  He states since last weds he has not been putting deodorants. He states it seems to be getting worse.       Review of Systems   Constitutional: Negative.    Respiratory: Negative.     Cardiovascular: Negative.    Skin:  Positive for rash.   Neurological: Negative.    Psychiatric/Behavioral: Negative.         Objective   There were no vitals taken for this visit.    Physical Exam  Constitutional:       General: He is not in acute distress.     Appearance: Normal appearance. He is not ill-appearing or toxic-appearing.   HENT:      Head: Normocephalic and atraumatic.    Pulmonary:      Effort: Pulmonary effort is normal. No respiratory distress.     Skin:     Comments: Erythemic rash under both breast and under right breast.Rash appears moist.    Did no examine rash on base of penis.      Neurological:      Mental Status: He is alert.     Psychiatric:         Mood and Affect: Mood normal.         Behavior: Behavior normal.         Thought Content: Thought content normal.         Judgment: Judgment normal.         Visit Time  Total Visit Duration: 5 minutes not including the time spent for establishing the audio/video connection.

## 2025-07-03 ENCOUNTER — OFFICE VISIT (OUTPATIENT)
Dept: FAMILY MEDICINE CLINIC | Facility: CLINIC | Age: 30
End: 2025-07-03

## 2025-07-03 VITALS
HEIGHT: 73 IN | HEART RATE: 88 BPM | BODY MASS INDEX: 41.75 KG/M2 | DIASTOLIC BLOOD PRESSURE: 81 MMHG | OXYGEN SATURATION: 97 % | WEIGHT: 315 LBS | RESPIRATION RATE: 18 BRPM | TEMPERATURE: 97.9 F | SYSTOLIC BLOOD PRESSURE: 161 MMHG

## 2025-07-03 DIAGNOSIS — E11.9 TYPE 2 DIABETES MELLITUS WITHOUT COMPLICATION, WITHOUT LONG-TERM CURRENT USE OF INSULIN (HCC): Primary | ICD-10-CM

## 2025-07-03 DIAGNOSIS — I10 PRIMARY HYPERTENSION: ICD-10-CM

## 2025-07-03 LAB
SL AMB POCT HEMOGLOBIN AIC: 12.2 (ref ?–6.5)
SL AMB POCT HEMOGLOBIN AIC: 12.2 (ref ?–6.5)

## 2025-07-03 PROCEDURE — 99214 OFFICE O/P EST MOD 30 MIN: CPT | Performed by: FAMILY MEDICINE

## 2025-07-03 PROCEDURE — 83036 HEMOGLOBIN GLYCOSYLATED A1C: CPT | Performed by: FAMILY MEDICINE

## 2025-07-03 RX ORDER — PEN NEEDLE, DIABETIC 32GX 5/32"
NEEDLE, DISPOSABLE MISCELLANEOUS
Qty: 100 EACH | Refills: 3 | Status: SHIPPED | OUTPATIENT
Start: 2025-07-03

## 2025-07-03 NOTE — ASSESSMENT & PLAN NOTE
Patient has self-discontinued metformin about one month ago; this resulted in a significant rise in A1C. Could not tolerate metformin due to diabetes or GLP-1 due to n/v/constipation   Lab Results   Component Value Date    HGBA1C 12.2 (A) 07/03/2025     Plan:  -start insuline glargine 30 units every morning 0.2 unit/kg calc is 37 units, but will underdose to 30 since pt is insulin naive   -Advised patient to continue to check home glucose twice a day   -follow-up in one month   -discussed starting statin therapy; patient not amenable at this time; will continue to discuss at next visit   - iris next visit. Foot exam done past visit   - nutrition referral a patients request     Orders:    POCT hemoglobin A1c    POCT hemoglobin A1c    Ambulatory Referral to Nutrition Services; Future    Insulin Pen Needle (BD Pen Needle Daniella U/F) 32G X 4 MM MISC; Use daily as directed with insulin pen    insulin glargine (LANTUS SOLOSTAR) 100 units/mL injection pen; Inject 30 Units under the skin daily

## 2025-07-03 NOTE — PROGRESS NOTES
Name: Hugo Hanna      : 1995      MRN: [id number]  Encounter Provider: Van Quintero DO  Encounter Date: 7/3/2025   Encounter department: [facility] [place]  :    A portion of this note was written by medical student William Cortez. I have reviewed their contribution to the note and I agree with their documentation.    Van Quintero D.O.  Family Medicine, PGY-2  25 3:43 PM     Assessment & Plan  Type 2 diabetes mellitus without complication, without long-term current use of insulin (HCC)  Patient has self-discontinued metformin about one month ago; this resulted in a significant rise in A1C. Could not tolerate metformin due to diabetes or GLP-1 due to n/v/constipation   Lab Results   Component Value Date    HGBA1C 12.2 (A) 2025     Plan:  -start insuline glargine 30 units every morning 0.2 unit/kg calc is 37 units, but will underdose to 30 since pt is insulin naive   -Advised patient to continue to check home glucose twice a day   -follow-up in one month   -discussed starting statin therapy; patient not amenable at this time; will continue to discuss at next visit   - iris next visit. Foot exam done past visit   - nutrition referral a patients request     Orders:    POCT hemoglobin A1c    POCT hemoglobin A1c    Ambulatory Referral to Nutrition Services; Future    Insulin Pen Needle (BD Pen Needle Daniella U/F) 32G X 4 MM MISC; Use daily as directed with insulin pen    insulin glargine (LANTUS SOLOSTAR) 100 units/mL injection pen; Inject 30 Units under the skin daily      Primary hypertension  161/81 during today's visit; likely 2/2 to uncontrolled blood sugar. Patient failed trial of lisinopril due to intolerable side effects.     Plan:  -patient not amenable to antihypertensive therapy at this time  -Advised patient to continue to check BP at home  -will reassess at next visit   -likely confounded in osmatically active elevated serum glucose load    "          Depression Screening and Follow-up Plan: Patient was screened for depression during today's encounter. They screened negative with a PHQ-2 score of 0.        History of Present Illness   Azeem Hanna is a 29 year old male patient presenting for a diabetes follow-up. His A1C in office today is 12.2, prior measurement 2/11/2025 was 9.5. He states that he self-discontinued metformin about one month ago due to GI upset and headaches; these resolved after discontinuing metformin. He was prescribed lisinopril last visit; this was also discontinued due to intolerable side effects such as nausea, vomiting, diarrhea. He has lost about 6 pounds from his prior visit. He states that he recently had a knee injury, but this has been resolving with regular physical therapy. Before this, he was walking several times weekly, and has been attempting to eat healthier.      Review of Systems   Constitutional: Negative.    HENT: Negative.     Eyes: Negative.    Respiratory: Negative.     Cardiovascular: Negative.    Gastrointestinal: Negative.    Endocrine: Negative.    Genitourinary: Negative.    Musculoskeletal: Negative.    Skin: Negative.    Allergic/Immunologic: Negative.    Neurological: Negative.    Hematological: Negative.    Psychiatric/Behavioral: Negative.         Objective   /81 (BP Location: Left arm, Patient Position: Sitting, Cuff Size: Standard)   Pulse 88   Temp 97.9 °F (36.6 °C) (Temporal)   Resp 18   Ht 6' 1\" (1.854 m)   Wt (!) 185 kg (406 lb 12.8 oz)   SpO2 97%   BMI 53.67 kg/m²      Physical Exam  Constitutional:       Appearance: Normal appearance.   HENT:      Head: Normocephalic and atraumatic.     Cardiovascular:      Rate and Rhythm: Normal rate and regular rhythm.      Pulses: Normal pulses.      Heart sounds: Normal heart sounds.   Pulmonary:      Effort: Pulmonary effort is normal.      Breath sounds: Normal breath sounds.   Abdominal:      General: Abdomen is flat.      Palpations: " Abdomen is soft.     Neurological:      General: No focal deficit present.      Mental Status: He is alert and oriented to person, place, and time.     Psychiatric:         Mood and Affect: Mood normal.         Behavior: Behavior normal.       Van Quintero DO  PGY-3 [facility]   07/03/25, 3:47 PM

## 2025-07-07 ENCOUNTER — OFFICE VISIT (OUTPATIENT)
Dept: OBGYN CLINIC | Facility: CLINIC | Age: 30
End: 2025-07-07
Attending: NURSE PRACTITIONER
Payer: COMMERCIAL

## 2025-07-07 ENCOUNTER — APPOINTMENT (OUTPATIENT)
Dept: RADIOLOGY | Facility: AMBULARY SURGERY CENTER | Age: 30
End: 2025-07-07
Attending: ORTHOPAEDIC SURGERY
Payer: COMMERCIAL

## 2025-07-07 VITALS — WEIGHT: 315 LBS | HEIGHT: 73 IN | BODY MASS INDEX: 41.75 KG/M2

## 2025-07-07 DIAGNOSIS — M25.562 CHRONIC PAIN OF LEFT KNEE: ICD-10-CM

## 2025-07-07 DIAGNOSIS — M76.52 PATELLAR TENDONITIS OF LEFT KNEE: ICD-10-CM

## 2025-07-07 DIAGNOSIS — G89.29 CHRONIC PAIN OF LEFT KNEE: ICD-10-CM

## 2025-07-07 DIAGNOSIS — G89.29 CHRONIC PAIN OF LEFT KNEE: Primary | ICD-10-CM

## 2025-07-07 DIAGNOSIS — M25.562 CHRONIC PAIN OF LEFT KNEE: Primary | ICD-10-CM

## 2025-07-07 DIAGNOSIS — Q68.2 PATELLA ALTA: ICD-10-CM

## 2025-07-07 PROCEDURE — 99213 OFFICE O/P EST LOW 20 MIN: CPT | Performed by: ORTHOPAEDIC SURGERY

## 2025-07-07 PROCEDURE — 73562 X-RAY EXAM OF KNEE 3: CPT

## 2025-07-07 NOTE — ASSESSMENT & PLAN NOTE
Diagnostics reviewed and physical exam performed.  Diagnosis, treatment options and associated risks were discussed with the patient including no treatment, nonsurgical treatment and potential for surgical intervention.  The patient was given the opportunity to ask questions regarding each.   X-ray obtained today and reviewed with the patient demonstrating patella letty  Order placed today for patient to initiate outpatient Physical Therapy  Continue Physical Therapy until discharged from their care, transition to home exercise program  Recommend patellar tendon strap to be worn for activity  Apply Voltaren gel to painful area up to 4 times a day  May use ice or heat as needed for pain relief  May take NSAIDs/Tylenol as needed for pain control  Follow up in 6 weeks for re-evaluation    Orders:    Ambulatory Referral to Orthopedic Surgery    XR knee 3 vw left non injury; Future    Ambulatory referral to Physical Therapy; Future    Durable Medical Equipment

## 2025-07-07 NOTE — PROGRESS NOTES
Name: Hugo Hanna      : 1995      MRN: [id number]  Encounter Provider: William Andrea DO  Encounter Date: 2025   Encounter department: [facility]      :  Assessment & Plan  Chronic pain of left knee  Patellar tendonitis of left knee  Patella letty  Diagnostics reviewed and physical exam performed.  Diagnosis, treatment options and associated risks were discussed with the patient including no treatment, nonsurgical treatment and potential for surgical intervention.  The patient was given the opportunity to ask questions regarding each.   X-ray obtained today and reviewed with the patient demonstrating patella letty  Order placed today for patient to initiate outpatient Physical Therapy  Continue Physical Therapy until discharged from their care, transition to home exercise program  Recommend patellar tendon strap to be worn for activity  Apply Voltaren gel to painful area up to 4 times a day  May use ice or heat as needed for pain relief  May take NSAIDs/Tylenol as needed for pain control  Follow up in 6 weeks for re-evaluation    Orders:    Ambulatory Referral to Orthopedic Surgery    XR knee 3 vw left non injury; Future    Ambulatory referral to Physical Therapy; Future    Durable Medical Equipment          Subjective:  Hugo Hanna is a 29 y.o. male who presents today for evaluation and treatment of acute exacerbation of chronic anterior knee pain that has been present since late . Pain was recently aggravated by ascending stairs and worst that evening and at work the next day. Pain is located in the anterior knee and feels like stabbing. He has completed PT in the past for this same knee pain with mild relief. He has been taking Meloxicam prescribed by the Urgent Care with minimal relief. He did also try Tylenol with no relief. He tried wearing a compression sleeve with minimal relief.         The following portions of the patient's history were  reviewed and updated as appropriate: allergies, current medications, past family history, past social history, past surgical history and problem list.      Social History     Socioeconomic History    Marital status: /Civil Union     Spouse name: Not on file    Number of children: Not on file    Years of education: Not on file    Highest education level: Not on file   Occupational History    Not on file   Tobacco Use    Smoking status: Never    Smokeless tobacco: Never   Vaping Use    Vaping status: Never Used   Substance and Sexual Activity    Alcohol use: Yes     Comment: socially    Drug use: No    Sexual activity: Not on file   Other Topics Concern    Not on file   Social History Narrative    Not on file     Social Drivers of Health     Financial Resource Strain: Low Risk  (7/3/2025)    Overall Financial Resource Strain (CARDIA)     Difficulty of Paying Living Expenses: Not hard at all   Food Insecurity: No Food Insecurity (7/3/2025)    Hunger Vital Sign     Worried About Running Out of Food in the Last Year: Never true     Ran Out of Food in the Last Year: Never true   Transportation Needs: No Transportation Needs (7/3/2025)    PRAPARE - Transportation     Lack of Transportation (Medical): No     Lack of Transportation (Non-Medical): No   Physical Activity: Inactive (7/3/2025)    Exercise Vital Sign     Days of Exercise per Week: 0 days     Minutes of Exercise per Session: 0 min   Stress: No Stress Concern Present (7/3/2025)    [employer] [place] [employer] - Occupational Stress Questionnaire     Feeling of Stress: Not at all   Social Connections: Not on file   Intimate Partner Violence: Not At Risk (7/3/2025)    Humiliation, Afraid, Rape, and Kick questionnaire     Fear of Current or Ex-Partner: No     Emotionally Abused: No     Physically Abused: No     Sexually Abused: No   Housing Stability: Low Risk  (7/3/2025)    Housing Stability Vital Sign     Unable to Pay for Housing in the Last Year:  "No     Number of Times Moved in the Last Year: 1     Homeless in the Last Year: No     Past Medical History[1]  Past Surgical History[2]  Allergies[3]  Medications Ordered Prior to Encounter[4]         Objective:    Review of Systems  Pertinent items are noted in HPI.  All other systems were reviewed and are negative.    Physical Exam  Cons: Appears well.  No apparent distress.  Psych: Alert. Oriented x3.  Mood and affect normal.  Eyes: PERRLA, EOMI  Resp: Normal effort.  No audible wheezing or stridor.  CV: Palpable pulse.  No discernable arrhythmia.  No LE edema.  Lymph:  No palpable cervical, axillary, or inguinal lymphadenopathy.  Skin: Warm.  No palpable masses.  No visible lesions.  Neuro: Normal muscle tone.  Normal and symmetric DTR's.    BMI:   Estimated body mass index is 53.83 kg/m² as calculated from the following:    Height as of this encounter: 6' 1\" (1.854 m).    Weight as of this encounter: 185 kg (408 lb).  Lab Results   Component Value Date    HGBA1C 12.2 (A) 07/03/2025         Left Knee Exam     Muscle Strength   The patient has normal left knee strength.    Range of Motion   The patient has normal left knee ROM.    Tests   Fanny:  Medial - negative Lateral - negative  Varus: negative Valgus: negative  Drawer:  Anterior - negative       Patellar apprehension: negative    Other   Erythema: absent  Scars: absent  Sensation: normal  Pulse: present  Swelling: none  Effusion: no effusion present    Comments:    NTTP Hoffa's fat pad, MPFL, medial and lateral patellar border  TTP origin of patellar tendon on distal pole of patella  Psoriatic plaque vs eczema anterior knee  + palpable patellofemoral crepitance            Procedures  Procedures  No Procedures performed today    Diagnostics, reviewed and taken today if performed as documented:  I have personally reviewed pertinent films in PACS and my interpretation is no acute osseous abnormalities. Patella Tawny. No to mild degenerative changes " "tricompartmentally.      Scribe Attestation      I,:  Shellie Shruthi am acting as a scribe while in the presence of the attending physician.:       I,:  William Andrea, DO personally performed the services described in this documentation    as scribed in my presence.:                 Portions of the record may have been created with voice recognition software.  Occasional wrong word or \"sound a like\" substitutions may have occurred due to the inherent limitations of voice recognition software.  Read the chart carefully and recognize, using context, where substitutions have occurred.         [1]   Past Medical History:  Diagnosis Date    Back pain     Testicle pain    [2]   Past Surgical History:  Procedure Laterality Date    FL INJECTION RIGHT SHOULDER (ARTHROGRAM)  02/06/2023    VASECTOMY  06/04/2024   [3]   Allergies  Allergen Reactions    Shellfish Allergy - Food Allergy Anaphylaxis    Other      Seasonal  pollen   [4]   Current Outpatient Medications on File Prior to Visit   Medication Sig Dispense Refill    insulin glargine (LANTUS SOLOSTAR) 100 units/mL injection pen Inject 30 Units under the skin daily 15 mL 1    Insulin Pen Needle (BD Pen Needle Daniella U/F) 32G X 4 MM MISC Use daily as directed with insulin pen 100 each 3     No current facility-administered medications on file prior to visit.     "

## 2025-07-07 NOTE — PATIENT INSTRUCTIONS
PATELLOFEMORAL SYNDROME-Yu TAPING TECHNIQUE    Search “Leukotape P tape” and “Cover roll stretch tape” on  [employer]  Leukotape P is typically 1.5in x 15 yards, Cover roll stretch tape is typically 2in x 10 yards        How to apply:  Place cover roll tape over knee cap. This protects the skin.  Apply Leukotape over the cover roll tape. Use the Leukotape to pull the knee cap to the middle of the body (medial side of knee) to prevent lateral (outside) tracking of the knee cap.  Wear the tape for 3 days (72 hrs) straight, then take off one day (24 hrs) off, then repeat for a total of 6 weeks. You should be > or = 50% improved at which time you may start weaning the tape by wearing it less each day.  Visit [employer] and search “Yu tape for the knee” to watch a video on how to apply tape.  Video titled “Yu Taping of the knee” created by [employer] recommended.    What does Yu taping technique do?  Patellofemoral syndrome is when the inside quadriceps muscle, called the VMO muscle, becomes weak due to a number of factors. This causes the Patellofemoral ligament, which is the only ligament holding the patella (knee cap) in place, to become weak as well. When the ligament becomes weak, the knee cap drifts or tracks too far to the lateral side of the knee (outside knee) which causes tension on this ligament. The knee cap hits the lateral area of the femur, resulting in pain or discomfort around the front of the knee.  Physical Therapy is sometimes used to strengthen the weak muscles, such as the VMO, to correct this problem. When the tape is applied correctly, it helps to realign the knee cap to the center of the knee. This helps correct for the lateral tracking of the knee cap and relieve discomfort.  You can search online for exercises that can help strengthen the VMO quadriceps muscle or attend physical therapy.

## 2025-07-10 ENCOUNTER — OFFICE VISIT (OUTPATIENT)
Dept: PHYSICAL THERAPY | Age: 30
End: 2025-07-10
Attending: NURSE PRACTITIONER
Payer: COMMERCIAL

## 2025-07-10 DIAGNOSIS — M25.562 CHRONIC PAIN OF LEFT KNEE: Primary | ICD-10-CM

## 2025-07-10 DIAGNOSIS — G89.29 CHRONIC PAIN OF LEFT KNEE: Primary | ICD-10-CM

## 2025-07-10 PROCEDURE — 97110 THERAPEUTIC EXERCISES: CPT | Performed by: PHYSICAL THERAPIST

## 2025-07-10 PROCEDURE — 97140 MANUAL THERAPY 1/> REGIONS: CPT | Performed by: PHYSICAL THERAPIST

## 2025-07-10 NOTE — PROGRESS NOTES
Daily Note     Today's date: 7/10/2025  Patient name: Hugo Hanna  : 1995  MRN: [id number]  Referring provider: Alma Delia Rodriges CRNP  Dx:   Encounter Diagnosis     ICD-10-CM    1. Chronic pain of left knee  M25.562     G89.29                      Subjective: no major changes since IE       Objective: See treatment diary below      Assessment: Tolerated treatment well. Patient would benefit from continued PT and pt tolerated session without increase in symptoms.       Plan: Continue per plan of care.  Progress treatment as tolerated.       Precautions: N/A      Manuals  /10           Prone quad stretch  CALDERON                                                  Neuro Re-Ed                                                                                                        Ther Ex             Slant board squat 3x15 1x a day  15'                         SAQ  5#           Hip abd   35# 3x10           Leg press  60# 3x10                                                  Ther Activity                                       Gait Training                                       Modalities

## 2025-07-14 ENCOUNTER — OFFICE VISIT (OUTPATIENT)
Dept: PHYSICAL THERAPY | Age: 30
End: 2025-07-14
Attending: NURSE PRACTITIONER
Payer: COMMERCIAL

## 2025-07-14 DIAGNOSIS — M25.562 CHRONIC PAIN OF LEFT KNEE: Primary | ICD-10-CM

## 2025-07-14 DIAGNOSIS — G89.29 CHRONIC PAIN OF LEFT KNEE: Primary | ICD-10-CM

## 2025-07-14 PROCEDURE — 97110 THERAPEUTIC EXERCISES: CPT | Performed by: PHYSICAL THERAPIST

## 2025-07-14 NOTE — PROGRESS NOTES
Daily Note     Today's date: 2025  Patient name: Hugo Hanna  : 1995  MRN: [id number]  Referring provider: Alma Delia Rodriges CRNP  Dx:   Encounter Diagnosis     ICD-10-CM    1. Chronic pain of left knee  M25.562     G89.29                      Subjective: no complaints after last session       Objective: See treatment diary below      Assessment: Tolerated treatment well. Patient demonstrated fatigue post treatment and would benefit from continued PT      Plan: Continue per plan of care.  Progress treatment as tolerated.       Precautions: N/A      Manuals  7/10 7/14          Prone quad stretch  YUMIKO CALDERON                                                 Neuro Re-Ed                                                                                                        Ther Ex             Slant board squat 3x15 1x a day  15'                         SAQ  5# 5# 4x10          Hip abd   35# 3x10 35# 3x10          Leg press  60# 3x10 60# 3x10                                                 Ther Activity                                       Gait Training                                       Modalities

## 2025-07-22 ENCOUNTER — OFFICE VISIT (OUTPATIENT)
Dept: PHYSICAL THERAPY | Age: 30
End: 2025-07-22
Attending: NURSE PRACTITIONER
Payer: COMMERCIAL

## 2025-07-22 DIAGNOSIS — M25.562 CHRONIC PAIN OF LEFT KNEE: Primary | ICD-10-CM

## 2025-07-22 DIAGNOSIS — G89.29 CHRONIC PAIN OF LEFT KNEE: Primary | ICD-10-CM

## 2025-07-22 PROCEDURE — 97110 THERAPEUTIC EXERCISES: CPT

## 2025-07-22 PROCEDURE — 97140 MANUAL THERAPY 1/> REGIONS: CPT

## 2025-07-22 NOTE — PROGRESS NOTES
"Daily Note     Today's date: 2025  Patient name: Hugo Hanna  : 1995  MRN: [id number]  Referring provider: Alma Delia Rodriges CRNP  Dx:   Encounter Diagnosis     ICD-10-CM    1. Chronic pain of left knee  M25.562     G89.29           Start Time: 1645  Stop Time: 1725  Total time in clinic (min): 40 minutes    Subjective: Pt stated that his knee is \"good\" at the start of today's session. Pt reported that his legs were sore for one day after last session. Pt reported that he walked 2 miles at the gym yesterday with minimal knee pain. Pt stated that the pain is still the worst with stairs.      Objective: See treatment diary below      Assessment: Patient was appropriately challenged during today's session. Pt tolerated introduction of heel raises on leg press to increase LE strength. Attempted lateral step down on 4 inch step which increased anterior knee pain. Discussed pt continuing with walking program as tolerated with good understanding. Tolerated treatment well. Patient demonstrated fatigue post treatment, exhibited good technique with therapeutic exercises, and would benefit from continued PT.      Plan: Continue per plan of care.  Progress treatment as tolerated.       Precautions: N/A      Manuals  7/10 7/14 7/22         Prone quad stretch  YUMIKO GALLO                                                 Neuro Re-Ed                                                                                                        Ther Ex             Slant board squat 3x15 1x a day  15'            nustep    5'          SAQ  5# 5# 4x10 7.5#  4x10          Hip abd   35# 3x10 35# 3x10 35#  4x10         Leg press  60# 3x10 60# 3x10 60#  4x10         HR on leg press    60#  3x10         Lateral step downs     P!                                   Ther Activity                                       Gait Training                                       Modalities                                                "

## 2025-07-29 ENCOUNTER — TRANSCRIBE ORDERS (OUTPATIENT)
Dept: ADMINISTRATIVE | Age: 30
End: 2025-07-29

## 2025-07-29 ENCOUNTER — APPOINTMENT (OUTPATIENT)
Dept: LAB | Age: 30
End: 2025-07-29

## 2025-07-29 ENCOUNTER — OFFICE VISIT (OUTPATIENT)
Dept: PHYSICAL THERAPY | Age: 30
End: 2025-07-29
Attending: NURSE PRACTITIONER
Payer: COMMERCIAL

## 2025-07-29 DIAGNOSIS — Z00.8 ENCOUNTER FOR OTHER GENERAL EXAMINATION: Primary | ICD-10-CM

## 2025-07-29 DIAGNOSIS — G89.29 CHRONIC PAIN OF LEFT KNEE: Primary | ICD-10-CM

## 2025-07-29 DIAGNOSIS — M25.562 CHRONIC PAIN OF LEFT KNEE: Primary | ICD-10-CM

## 2025-07-29 DIAGNOSIS — Z00.8 ENCOUNTER FOR OTHER GENERAL EXAMINATION: ICD-10-CM

## 2025-07-29 LAB
CHOLEST SERPL-MCNC: 173 MG/DL (ref ?–200)
EST. AVERAGE GLUCOSE BLD GHB EST-MCNC: 263 MG/DL
HBA1C MFR BLD: 10.8 %
HDLC SERPL-MCNC: 32 MG/DL
LDLC SERPL CALC-MCNC: 120 MG/DL (ref 0–100)
NONHDLC SERPL-MCNC: 141 MG/DL
TRIGL SERPL-MCNC: 105 MG/DL (ref ?–150)

## 2025-07-29 PROCEDURE — 97110 THERAPEUTIC EXERCISES: CPT | Performed by: PHYSICAL THERAPIST

## 2025-07-29 PROCEDURE — 80061 LIPID PANEL: CPT

## 2025-07-29 PROCEDURE — 83036 HEMOGLOBIN GLYCOSYLATED A1C: CPT

## 2025-07-29 PROCEDURE — 36415 COLL VENOUS BLD VENIPUNCTURE: CPT

## 2025-07-29 PROCEDURE — 97140 MANUAL THERAPY 1/> REGIONS: CPT | Performed by: PHYSICAL THERAPIST

## 2025-07-31 ENCOUNTER — OFFICE VISIT (OUTPATIENT)
Dept: PHYSICAL THERAPY | Age: 30
End: 2025-07-31
Attending: NURSE PRACTITIONER
Payer: COMMERCIAL

## 2025-07-31 DIAGNOSIS — M25.562 CHRONIC PAIN OF LEFT KNEE: Primary | ICD-10-CM

## 2025-07-31 DIAGNOSIS — G89.29 CHRONIC PAIN OF LEFT KNEE: Primary | ICD-10-CM

## 2025-07-31 PROCEDURE — 97140 MANUAL THERAPY 1/> REGIONS: CPT | Performed by: PHYSICAL THERAPIST

## 2025-07-31 PROCEDURE — 97110 THERAPEUTIC EXERCISES: CPT | Performed by: PHYSICAL THERAPIST

## 2025-08-04 ENCOUNTER — OFFICE VISIT (OUTPATIENT)
Dept: FAMILY MEDICINE CLINIC | Facility: CLINIC | Age: 30
End: 2025-08-04

## 2025-08-04 VITALS
OXYGEN SATURATION: 97 % | SYSTOLIC BLOOD PRESSURE: 163 MMHG | RESPIRATION RATE: 18 BRPM | WEIGHT: 315 LBS | HEART RATE: 93 BPM | DIASTOLIC BLOOD PRESSURE: 101 MMHG | TEMPERATURE: 97.3 F | BODY MASS INDEX: 53.67 KG/M2

## 2025-08-04 DIAGNOSIS — E11.9 TYPE 2 DIABETES MELLITUS WITHOUT COMPLICATION, WITHOUT LONG-TERM CURRENT USE OF INSULIN (HCC): Primary | ICD-10-CM

## 2025-08-04 DIAGNOSIS — E78.2 MIXED HYPERLIPIDEMIA: ICD-10-CM

## 2025-08-04 DIAGNOSIS — R03.0 ELEVATED BLOOD PRESSURE READING: ICD-10-CM

## 2025-08-04 PROCEDURE — 99213 OFFICE O/P EST LOW 20 MIN: CPT

## 2025-08-04 RX ORDER — ROSUVASTATIN CALCIUM 5 MG/1
5 TABLET, COATED ORAL DAILY
Qty: 90 TABLET | Refills: 1 | Status: SHIPPED | OUTPATIENT
Start: 2025-08-04

## 2025-08-04 RX ORDER — ACYCLOVIR 400 MG/1
1 TABLET ORAL
Qty: 9 EACH | Refills: 3 | Status: SHIPPED | OUTPATIENT
Start: 2025-08-04

## 2025-08-04 RX ORDER — PEN NEEDLE, DIABETIC 32GX 5/32"
NEEDLE, DISPOSABLE MISCELLANEOUS
Qty: 100 EACH | Refills: 3 | Status: SHIPPED | OUTPATIENT
Start: 2025-08-04

## 2025-08-05 ENCOUNTER — OFFICE VISIT (OUTPATIENT)
Dept: PHYSICAL THERAPY | Age: 30
End: 2025-08-05
Attending: NURSE PRACTITIONER
Payer: COMMERCIAL

## 2025-08-05 DIAGNOSIS — M25.562 CHRONIC PAIN OF LEFT KNEE: Primary | ICD-10-CM

## 2025-08-05 DIAGNOSIS — G89.29 CHRONIC PAIN OF LEFT KNEE: Primary | ICD-10-CM

## 2025-08-05 PROCEDURE — 97140 MANUAL THERAPY 1/> REGIONS: CPT | Performed by: PHYSICAL THERAPIST

## 2025-08-05 PROCEDURE — 97110 THERAPEUTIC EXERCISES: CPT | Performed by: PHYSICAL THERAPIST

## 2025-08-12 ENCOUNTER — OFFICE VISIT (OUTPATIENT)
Dept: PHYSICAL THERAPY | Age: 30
End: 2025-08-12
Attending: NURSE PRACTITIONER
Payer: COMMERCIAL

## 2025-08-14 ENCOUNTER — OFFICE VISIT (OUTPATIENT)
Dept: PHYSICAL THERAPY | Age: 30
End: 2025-08-14
Attending: NURSE PRACTITIONER
Payer: COMMERCIAL

## 2025-08-19 ENCOUNTER — OFFICE VISIT (OUTPATIENT)
Dept: PHYSICAL THERAPY | Age: 30
End: 2025-08-19
Attending: NURSE PRACTITIONER
Payer: COMMERCIAL

## 2025-08-19 ENCOUNTER — OFFICE VISIT (OUTPATIENT)
Age: 30
End: 2025-08-19

## 2025-08-19 VITALS — BODY MASS INDEX: 41.75 KG/M2 | WEIGHT: 315 LBS | HEIGHT: 73 IN

## 2025-08-19 DIAGNOSIS — L40.9 PSORIASIS: Primary | ICD-10-CM

## 2025-08-19 DIAGNOSIS — D48.9 NEOPLASM OF UNCERTAIN BEHAVIOR: ICD-10-CM

## 2025-08-19 DIAGNOSIS — M25.562 CHRONIC PAIN OF LEFT KNEE: Primary | ICD-10-CM

## 2025-08-19 DIAGNOSIS — G89.29 CHRONIC PAIN OF LEFT KNEE: Primary | ICD-10-CM

## 2025-08-19 PROCEDURE — 97110 THERAPEUTIC EXERCISES: CPT | Performed by: PHYSICAL THERAPIST

## 2025-08-19 PROCEDURE — 88305 TISSUE EXAM BY PATHOLOGIST: CPT | Performed by: PATHOLOGY

## 2025-08-19 PROCEDURE — 97140 MANUAL THERAPY 1/> REGIONS: CPT | Performed by: PHYSICAL THERAPIST

## 2025-08-19 RX ORDER — CLOBETASOL PROPIONATE 0.5 MG/G
OINTMENT TOPICAL 2 TIMES DAILY
Qty: 60 G | Refills: 3 | Status: SHIPPED | OUTPATIENT
Start: 2025-08-19

## 2025-08-21 ENCOUNTER — OFFICE VISIT (OUTPATIENT)
Dept: RHEUMATOLOGY | Facility: CLINIC | Age: 30
End: 2025-08-21
Payer: COMMERCIAL

## 2025-08-21 VITALS
WEIGHT: 315 LBS | BODY MASS INDEX: 41.75 KG/M2 | HEART RATE: 98 BPM | OXYGEN SATURATION: 97 % | SYSTOLIC BLOOD PRESSURE: 132 MMHG | TEMPERATURE: 96.6 F | DIASTOLIC BLOOD PRESSURE: 80 MMHG | HEIGHT: 73 IN

## 2025-08-21 DIAGNOSIS — M25.50 ARTHRALGIA, UNSPECIFIED JOINT: Primary | ICD-10-CM

## 2025-08-21 PROCEDURE — 99244 OFF/OP CNSLTJ NEW/EST MOD 40: CPT | Performed by: STUDENT IN AN ORGANIZED HEALTH CARE EDUCATION/TRAINING PROGRAM

## 2025-08-21 RX ORDER — INSULIN GLARGINE-YFGN 100 [IU]/ML
INJECTION, SOLUTION SUBCUTANEOUS
COMMUNITY
Start: 2025-08-04

## 2025-08-21 RX ORDER — CELECOXIB 200 MG/1
200 CAPSULE ORAL 2 TIMES DAILY
Qty: 60 CAPSULE | Refills: 3 | Status: SHIPPED | OUTPATIENT
Start: 2025-08-21